# Patient Record
Sex: FEMALE | Race: BLACK OR AFRICAN AMERICAN | NOT HISPANIC OR LATINO | Employment: PART TIME | URBAN - METROPOLITAN AREA
[De-identification: names, ages, dates, MRNs, and addresses within clinical notes are randomized per-mention and may not be internally consistent; named-entity substitution may affect disease eponyms.]

---

## 2018-11-27 ENCOUNTER — TRANSCRIBE ORDERS (OUTPATIENT)
Dept: PHYSICAL THERAPY | Facility: HOSPITAL | Age: 53
End: 2018-11-27

## 2018-11-27 DIAGNOSIS — Z86.73 PERSONAL HISTORY OF TRANSIENT ISCHEMIC ATTACK (TIA), AND CEREBRAL INFARCTION WITHOUT RESIDUAL DEFICITS: Primary | ICD-10-CM

## 2018-11-30 ENCOUNTER — HOSPITAL ENCOUNTER (OUTPATIENT)
Dept: SPEECH THERAPY | Facility: HOSPITAL | Age: 53
Setting detail: THERAPIES SERIES
Discharge: HOME | End: 2018-11-30

## 2018-11-30 DIAGNOSIS — Z86.73 PERSONAL HISTORY OF TRANSIENT CEREBRAL ISCHEMIA: Primary | ICD-10-CM

## 2018-11-30 PROCEDURE — 92523 SPEECH SOUND LANG COMPREHEN: CPT | Performed by: SPEECH-LANGUAGE PATHOLOGIST

## 2018-12-02 ENCOUNTER — HOSPITAL ENCOUNTER (INPATIENT)
Facility: HOSPITAL | Age: 53
LOS: 1 days | Discharge: HOME | End: 2018-12-04
Attending: EMERGENCY MEDICINE | Admitting: INTERNAL MEDICINE

## 2018-12-02 ENCOUNTER — APPOINTMENT (OUTPATIENT)
Dept: GENERAL RADIOLOGY | Facility: HOSPITAL | Age: 53
End: 2018-12-02

## 2018-12-02 ENCOUNTER — APPOINTMENT (OUTPATIENT)
Dept: MRI IMAGING | Facility: HOSPITAL | Age: 53
End: 2018-12-02

## 2018-12-02 DIAGNOSIS — I63.9 ACUTE ISCHEMIC STROKE (HCC): ICD-10-CM

## 2018-12-02 DIAGNOSIS — E87.6 HYPOKALEMIA: ICD-10-CM

## 2018-12-02 DIAGNOSIS — Z78.9 IMPAIRED MOBILITY AND ADLS: ICD-10-CM

## 2018-12-02 DIAGNOSIS — Z74.09 IMPAIRED MOBILITY AND ADLS: ICD-10-CM

## 2018-12-02 DIAGNOSIS — Z86.73 HISTORY OF STROKE IN PRIOR 3 MONTHS: Primary | ICD-10-CM

## 2018-12-02 DIAGNOSIS — Z74.09 IMPAIRED FUNCTIONAL MOBILITY, BALANCE, GAIT, AND ENDURANCE: ICD-10-CM

## 2018-12-02 LAB
ALBUMIN SERPL-MCNC: 4.39 G/DL (ref 3.2–4.8)
ALBUMIN/GLOB SERPL: 1.3 G/DL (ref 1.5–2.5)
ALP SERPL-CCNC: 114 U/L (ref 25–100)
ALT SERPL W P-5'-P-CCNC: 19 U/L (ref 7–40)
ANION GAP SERPL CALCULATED.3IONS-SCNC: 1 MMOL/L (ref 3–11)
AST SERPL-CCNC: 16 U/L (ref 0–33)
BASOPHILS # BLD AUTO: 0.02 10*3/MM3 (ref 0–0.2)
BASOPHILS NFR BLD AUTO: 0.2 % (ref 0–1)
BILIRUB SERPL-MCNC: 0.4 MG/DL (ref 0.3–1.2)
BUN BLD-MCNC: 18 MG/DL (ref 9–23)
BUN/CREAT SERPL: 20 (ref 7–25)
CALCIUM SPEC-SCNC: 9.3 MG/DL (ref 8.7–10.4)
CHLORIDE SERPL-SCNC: 105 MMOL/L (ref 99–109)
CO2 SERPL-SCNC: 34 MMOL/L (ref 20–31)
CREAT BLD-MCNC: 0.9 MG/DL (ref 0.6–1.3)
DEPRECATED RDW RBC AUTO: 42.4 FL (ref 37–54)
EOSINOPHIL # BLD AUTO: 0.09 10*3/MM3 (ref 0–0.3)
EOSINOPHIL NFR BLD AUTO: 1 % (ref 0–3)
ERYTHROCYTE [DISTWIDTH] IN BLOOD BY AUTOMATED COUNT: 13.5 % (ref 11.3–14.5)
GFR SERPL CREATININE-BSD FRML MDRD: 80 ML/MIN/1.73
GLOBULIN UR ELPH-MCNC: 3.3 GM/DL
GLUCOSE BLD-MCNC: 108 MG/DL (ref 70–100)
HCT VFR BLD AUTO: 37.5 % (ref 34.5–44)
HGB BLD-MCNC: 12.9 G/DL (ref 11.5–15.5)
IMM GRANULOCYTES # BLD: 0.02 10*3/MM3 (ref 0–0.03)
IMM GRANULOCYTES NFR BLD: 0.2 % (ref 0–0.6)
INR PPP: 1.04 (ref 0.91–1.09)
LYMPHOCYTES # BLD AUTO: 2.48 10*3/MM3 (ref 0.6–4.8)
LYMPHOCYTES NFR BLD AUTO: 27.6 % (ref 24–44)
MCH RBC QN AUTO: 29.7 PG (ref 27–31)
MCHC RBC AUTO-ENTMCNC: 34.4 G/DL (ref 32–36)
MCV RBC AUTO: 86.4 FL (ref 80–99)
MONOCYTES # BLD AUTO: 0.85 10*3/MM3 (ref 0–1)
MONOCYTES NFR BLD AUTO: 9.5 % (ref 0–12)
NEUTROPHILS # BLD AUTO: 5.54 10*3/MM3 (ref 1.5–8.3)
NEUTROPHILS NFR BLD AUTO: 61.7 % (ref 41–71)
PLATELET # BLD AUTO: 313 10*3/MM3 (ref 150–450)
PMV BLD AUTO: 10.3 FL (ref 6–12)
POTASSIUM BLD-SCNC: 3.2 MMOL/L (ref 3.5–5.5)
PROT SERPL-MCNC: 7.7 G/DL (ref 5.7–8.2)
PROTHROMBIN TIME: 10.9 SECONDS (ref 9.6–11.5)
RBC # BLD AUTO: 4.34 10*6/MM3 (ref 3.89–5.14)
SODIUM BLD-SCNC: 140 MMOL/L (ref 132–146)
WBC NRBC COR # BLD: 8.98 10*3/MM3 (ref 3.5–10.8)

## 2018-12-02 PROCEDURE — 85025 COMPLETE CBC W/AUTO DIFF WBC: CPT | Performed by: EMERGENCY MEDICINE

## 2018-12-02 PROCEDURE — 71045 X-RAY EXAM CHEST 1 VIEW: CPT

## 2018-12-02 PROCEDURE — 80053 COMPREHEN METABOLIC PANEL: CPT | Performed by: EMERGENCY MEDICINE

## 2018-12-02 PROCEDURE — 85610 PROTHROMBIN TIME: CPT | Performed by: EMERGENCY MEDICINE

## 2018-12-02 PROCEDURE — 81001 URINALYSIS AUTO W/SCOPE: CPT | Performed by: EMERGENCY MEDICINE

## 2018-12-02 PROCEDURE — 70551 MRI BRAIN STEM W/O DYE: CPT

## 2018-12-02 PROCEDURE — 99285 EMERGENCY DEPT VISIT HI MDM: CPT

## 2018-12-02 RX ORDER — SODIUM CHLORIDE 0.9 % (FLUSH) 0.9 %
10 SYRINGE (ML) INJECTION AS NEEDED
Status: DISCONTINUED | OUTPATIENT
Start: 2018-12-02 | End: 2018-12-04 | Stop reason: HOSPADM

## 2018-12-02 RX ORDER — ASPIRIN 325 MG
325 TABLET ORAL ONCE
Status: COMPLETED | OUTPATIENT
Start: 2018-12-03 | End: 2018-12-03

## 2018-12-02 RX ORDER — POTASSIUM CHLORIDE 750 MG/1
20 CAPSULE, EXTENDED RELEASE ORAL ONCE
Status: COMPLETED | OUTPATIENT
Start: 2018-12-02 | End: 2018-12-02

## 2018-12-02 RX ADMIN — POTASSIUM CHLORIDE 20 MEQ: 750 CAPSULE, EXTENDED RELEASE ORAL at 23:41

## 2018-12-03 ENCOUNTER — APPOINTMENT (OUTPATIENT)
Dept: CARDIOLOGY | Facility: HOSPITAL | Age: 53
End: 2018-12-03

## 2018-12-03 PROBLEM — I10 ESSENTIAL HYPERTENSION: Status: ACTIVE | Noted: 2018-12-03

## 2018-12-03 PROBLEM — E11.9 TYPE 2 DIABETES MELLITUS (HCC): Status: ACTIVE | Noted: 2018-12-03

## 2018-12-03 PROBLEM — M06.9 RHEUMATOID ARTHRITIS (HCC): Status: ACTIVE | Noted: 2018-12-03

## 2018-12-03 PROBLEM — I63.9 CVA (CEREBRAL VASCULAR ACCIDENT) (HCC): Status: ACTIVE | Noted: 2018-12-03

## 2018-12-03 PROBLEM — E78.5 HYPERLIPEMIA: Status: ACTIVE | Noted: 2018-12-03

## 2018-12-03 LAB
ALBUMIN SERPL-MCNC: 4 G/DL (ref 3.2–4.8)
ALBUMIN/GLOB SERPL: 1.3 G/DL (ref 1.5–2.5)
ALP SERPL-CCNC: 103 U/L (ref 25–100)
ALT SERPL W P-5'-P-CCNC: 18 U/L (ref 7–40)
ANION GAP SERPL CALCULATED.3IONS-SCNC: 7 MMOL/L (ref 3–11)
ARTICHOKE IGE QN: 50 MG/DL (ref 0–130)
AST SERPL-CCNC: 17 U/L (ref 0–33)
BACTERIA UR QL AUTO: ABNORMAL /HPF
BH CV ECHO MEAS - AO MAX PG (FULL): 2.2 MMHG
BH CV ECHO MEAS - AO MAX PG: 7.4 MMHG
BH CV ECHO MEAS - AO MEAN PG (FULL): 0.84 MMHG
BH CV ECHO MEAS - AO MEAN PG: 3.5 MMHG
BH CV ECHO MEAS - AO ROOT AREA: 7 CM^2
BH CV ECHO MEAS - AO ROOT DIAM: 3 CM
BH CV ECHO MEAS - AO V2 MAX: 135.7 CM/SEC
BH CV ECHO MEAS - AO V2 MEAN: 85.4 CM/SEC
BH CV ECHO MEAS - AO V2 VTI: 34.3 CM
BH CV ECHO MEAS - ASC AORTA: 3 CM
BH CV ECHO MEAS - AVA(I,A): 2.6 CM^2
BH CV ECHO MEAS - AVA(I,D): 2.6 CM^2
BH CV ECHO MEAS - AVA(V,A): 2.7 CM^2
BH CV ECHO MEAS - AVA(V,D): 2.7 CM^2
BH CV ECHO MEAS - EDV(CUBED): 91.2 ML
BH CV ECHO MEAS - EDV(TEICH): 92.5 ML
BH CV ECHO MEAS - EF(CUBED): 83.2 %
BH CV ECHO MEAS - EF(TEICH): 76.2 %
BH CV ECHO MEAS - ESV(CUBED): 15.4 ML
BH CV ECHO MEAS - ESV(TEICH): 22 ML
BH CV ECHO MEAS - FS: 44.8 %
BH CV ECHO MEAS - IVS/LVPW: 1.1
BH CV ECHO MEAS - IVSD: 1.1 CM
BH CV ECHO MEAS - LA DIMENSION: 3.7 CM
BH CV ECHO MEAS - LA/AO: 1.3
BH CV ECHO MEAS - LAD MAJOR: 5.2 CM
BH CV ECHO MEAS - LAT PEAK E' VEL: 8.1 CM/SEC
BH CV ECHO MEAS - LATERAL E/E' RATIO: 10.2
BH CV ECHO MEAS - LV MASS(C)D: 167 GRAMS
BH CV ECHO MEAS - LV MAX PG: 5.2 MMHG
BH CV ECHO MEAS - LV MEAN PG: 2.6 MMHG
BH CV ECHO MEAS - LV V1 MAX: 113.5 CM/SEC
BH CV ECHO MEAS - LV V1 MEAN: 73.9 CM/SEC
BH CV ECHO MEAS - LV V1 VTI: 27.6 CM
BH CV ECHO MEAS - LVIDD: 4.5 CM
BH CV ECHO MEAS - LVIDS: 2.5 CM
BH CV ECHO MEAS - LVOT AREA (M): 3.1 CM^2
BH CV ECHO MEAS - LVOT AREA: 3.2 CM^2
BH CV ECHO MEAS - LVOT DIAM: 2 CM
BH CV ECHO MEAS - LVPWD: 1 CM
BH CV ECHO MEAS - MED PEAK E' VEL: 6 CM/SEC
BH CV ECHO MEAS - MEDIAL E/E' RATIO: 13.7
BH CV ECHO MEAS - MV A MAX VEL: 82.4 CM/SEC
BH CV ECHO MEAS - MV DEC TIME: 0.17 SEC
BH CV ECHO MEAS - MV E MAX VEL: 84.4 CM/SEC
BH CV ECHO MEAS - MV E/A: 1
BH CV ECHO MEAS - PA ACC SLOPE: 611.7 CM/SEC^2
BH CV ECHO MEAS - PA ACC TIME: 0.12 SEC
BH CV ECHO MEAS - PA MAX PG: 3.1 MMHG
BH CV ECHO MEAS - PA PR(ACCEL): 22.8 MMHG
BH CV ECHO MEAS - PA V2 MAX: 88.6 CM/SEC
BH CV ECHO MEAS - RAP SYSTOLE: 3 MMHG
BH CV ECHO MEAS - RVDD: 1.8 CM
BH CV ECHO MEAS - RVSP: 30 MMHG
BH CV ECHO MEAS - SV(AO): 239.9 ML
BH CV ECHO MEAS - SV(CUBED): 75.8 ML
BH CV ECHO MEAS - SV(LVOT): 88.2 ML
BH CV ECHO MEAS - SV(TEICH): 70.5 ML
BH CV ECHO MEAS - TAPSE (>1.6): 3 CM2
BH CV ECHO MEAS - TR MAX PG: 27 MMHG
BH CV ECHO MEAS - TR MAX VEL: 259.1 CM/SEC
BH CV ECHO MEASUREMENTS AVERAGE E/E' RATIO: 11.97
BH CV XLRA - RV BASE: 3.3 CM
BH CV XLRA - RV LENGTH: 7 CM
BH CV XLRA - RV MID: 2.6 CM
BH CV XLRA - TDI S': 14.6 CM/SEC
BH CV XLRA MEAS LEFT DIST CCA EDV: 22 CM/SEC
BH CV XLRA MEAS LEFT DIST CCA PSV: 85 CM/SEC
BH CV XLRA MEAS LEFT DIST ICA EDV: 24 CM/SEC
BH CV XLRA MEAS LEFT DIST ICA PSV: 79 CM/SEC
BH CV XLRA MEAS LEFT ICA/CCA RATIO: 0.64
BH CV XLRA MEAS LEFT MID CCA EDV: 19 CM/SEC
BH CV XLRA MEAS LEFT MID CCA PSV: 109 CM/SEC
BH CV XLRA MEAS LEFT MID ICA EDV: 16 CM/SEC
BH CV XLRA MEAS LEFT MID ICA PSV: 49 CM/SEC
BH CV XLRA MEAS LEFT PROX CCA EDV: 19 CM/SEC
BH CV XLRA MEAS LEFT PROX CCA PSV: 137 CM/SEC
BH CV XLRA MEAS LEFT PROX ECA EDV: 16 CM/SEC
BH CV XLRA MEAS LEFT PROX ECA PSV: 72 CM/SEC
BH CV XLRA MEAS LEFT PROX ICA EDV: 13 CM/SEC
BH CV XLRA MEAS LEFT PROX ICA PSV: 55 CM/SEC
BH CV XLRA MEAS LEFT PROX SCLA PSV: 132 CM/SEC
BH CV XLRA MEAS LEFT VERTEBRAL A EDV: 17 CM/SEC
BH CV XLRA MEAS LEFT VERTEBRAL A PSV: 59 CM/SEC
BH CV XLRA MEAS RIGHT CCA RATIO VEL: 82.5 CM/SEC
BH CV XLRA MEAS RIGHT DIST CCA EDV: 21.2 CM/SEC
BH CV XLRA MEAS RIGHT DIST CCA PSV: 83.3 CM/SEC
BH CV XLRA MEAS RIGHT DIST ICA EDV: 23.6 CM/SEC
BH CV XLRA MEAS RIGHT DIST ICA PSV: 54.2 CM/SEC
BH CV XLRA MEAS RIGHT ICA RATIO VEL: 91.1 CM/SEC
BH CV XLRA MEAS RIGHT ICA/CCA RATIO: 1.1
BH CV XLRA MEAS RIGHT MID CCA EDV: 22.8 CM/SEC
BH CV XLRA MEAS RIGHT MID CCA PSV: 107.6 CM/SEC
BH CV XLRA MEAS RIGHT MID ICA EDV: 18.9 CM/SEC
BH CV XLRA MEAS RIGHT MID ICA PSV: 91.9 CM/SEC
BH CV XLRA MEAS RIGHT PROX CCA EDV: 19.6 CM/SEC
BH CV XLRA MEAS RIGHT PROX CCA PSV: 114.7 CM/SEC
BH CV XLRA MEAS RIGHT PROX ECA EDV: 11.8 CM/SEC
BH CV XLRA MEAS RIGHT PROX ECA PSV: 77.8 CM/SEC
BH CV XLRA MEAS RIGHT PROX ICA EDV: 22 CM/SEC
BH CV XLRA MEAS RIGHT PROX ICA PSV: 84.1 CM/SEC
BH CV XLRA MEAS RIGHT PROX SCLA PSV: 89.6 CM/SEC
BH CV XLRA MEAS RIGHT VERTEBRAL A EDV: 21.2 CM/SEC
BH CV XLRA MEAS RIGHT VERTEBRAL A PSV: 55.8 CM/SEC
BILIRUB SERPL-MCNC: 0.5 MG/DL (ref 0.3–1.2)
BILIRUB UR QL STRIP: NEGATIVE
BUN BLD-MCNC: 14 MG/DL (ref 9–23)
BUN/CREAT SERPL: 16.9 (ref 7–25)
CALCIUM SPEC-SCNC: 9.1 MG/DL (ref 8.7–10.4)
CHLORIDE SERPL-SCNC: 102 MMOL/L (ref 99–109)
CHOLEST SERPL-MCNC: 93 MG/DL (ref 0–200)
CLARITY UR: CLEAR
CO2 SERPL-SCNC: 30 MMOL/L (ref 20–31)
COLOR UR: YELLOW
CREAT BLD-MCNC: 0.83 MG/DL (ref 0.6–1.3)
GFR SERPL CREATININE-BSD FRML MDRD: 87 ML/MIN/1.73
GLOBULIN UR ELPH-MCNC: 3 GM/DL
GLUCOSE BLD-MCNC: 94 MG/DL (ref 70–100)
GLUCOSE BLDC GLUCOMTR-MCNC: 100 MG/DL (ref 70–130)
GLUCOSE BLDC GLUCOMTR-MCNC: 102 MG/DL (ref 70–130)
GLUCOSE BLDC GLUCOMTR-MCNC: 119 MG/DL (ref 70–130)
GLUCOSE BLDC GLUCOMTR-MCNC: 132 MG/DL (ref 70–130)
GLUCOSE BLDC GLUCOMTR-MCNC: 145 MG/DL (ref 70–130)
GLUCOSE BLDC GLUCOMTR-MCNC: 97 MG/DL (ref 70–130)
GLUCOSE UR STRIP-MCNC: NEGATIVE MG/DL
HBA1C MFR BLD: 6.6 % (ref 4.8–5.6)
HDLC SERPL-MCNC: 30 MG/DL (ref 40–60)
HGB UR QL STRIP.AUTO: NEGATIVE
HYALINE CASTS UR QL AUTO: ABNORMAL /LPF
KETONES UR QL STRIP: NEGATIVE
LEUKOCYTE ESTERASE UR QL STRIP.AUTO: ABNORMAL
NITRITE UR QL STRIP: NEGATIVE
PH UR STRIP.AUTO: 6.5 [PH] (ref 5–8)
POTASSIUM BLD-SCNC: 3.3 MMOL/L (ref 3.5–5.5)
PROT SERPL-MCNC: 7 G/DL (ref 5.7–8.2)
PROT UR QL STRIP: NEGATIVE
RBC # UR: ABNORMAL /HPF
REF LAB TEST METHOD: ABNORMAL
SODIUM BLD-SCNC: 139 MMOL/L (ref 132–146)
SP GR UR STRIP: 1.02 (ref 1–1.03)
SQUAMOUS #/AREA URNS HPF: ABNORMAL /HPF
TRIGL SERPL-MCNC: 53 MG/DL (ref 0–150)
UROBILINOGEN UR QL STRIP: ABNORMAL
WBC UR QL AUTO: ABNORMAL /HPF

## 2018-12-03 PROCEDURE — 82962 GLUCOSE BLOOD TEST: CPT

## 2018-12-03 PROCEDURE — 93880 EXTRACRANIAL BILAT STUDY: CPT

## 2018-12-03 PROCEDURE — 93306 TTE W/DOPPLER COMPLETE: CPT

## 2018-12-03 PROCEDURE — 63710000001 INSULIN LISPRO (HUMAN) PER 5 UNITS: Performed by: PHYSICIAN ASSISTANT

## 2018-12-03 PROCEDURE — G8978 MOBILITY CURRENT STATUS: HCPCS

## 2018-12-03 PROCEDURE — 97116 GAIT TRAINING THERAPY: CPT

## 2018-12-03 PROCEDURE — 80061 LIPID PANEL: CPT | Performed by: PHYSICIAN ASSISTANT

## 2018-12-03 PROCEDURE — 92523 SPEECH SOUND LANG COMPREHEN: CPT

## 2018-12-03 PROCEDURE — 97162 PT EVAL MOD COMPLEX 30 MIN: CPT

## 2018-12-03 PROCEDURE — 97165 OT EVAL LOW COMPLEX 30 MIN: CPT

## 2018-12-03 PROCEDURE — 83036 HEMOGLOBIN GLYCOSYLATED A1C: CPT | Performed by: PHYSICIAN ASSISTANT

## 2018-12-03 PROCEDURE — 93880 EXTRACRANIAL BILAT STUDY: CPT | Performed by: INTERNAL MEDICINE

## 2018-12-03 PROCEDURE — 99255 IP/OBS CONSLTJ NEW/EST HI 80: CPT | Performed by: PSYCHIATRY & NEUROLOGY

## 2018-12-03 PROCEDURE — G8979 MOBILITY GOAL STATUS: HCPCS

## 2018-12-03 PROCEDURE — 80053 COMPREHEN METABOLIC PANEL: CPT | Performed by: PHYSICIAN ASSISTANT

## 2018-12-03 PROCEDURE — 93306 TTE W/DOPPLER COMPLETE: CPT | Performed by: INTERNAL MEDICINE

## 2018-12-03 PROCEDURE — 97530 THERAPEUTIC ACTIVITIES: CPT

## 2018-12-03 PROCEDURE — 99223 1ST HOSP IP/OBS HIGH 75: CPT | Performed by: INTERNAL MEDICINE

## 2018-12-03 RX ORDER — DEXTROSE MONOHYDRATE 25 G/50ML
25 INJECTION, SOLUTION INTRAVENOUS
Status: DISCONTINUED | OUTPATIENT
Start: 2018-12-03 | End: 2018-12-04 | Stop reason: HOSPADM

## 2018-12-03 RX ORDER — HYDROCHLOROTHIAZIDE 25 MG/1
25 TABLET ORAL DAILY
COMMUNITY

## 2018-12-03 RX ORDER — ATORVASTATIN CALCIUM 40 MG/1
40 TABLET, FILM COATED ORAL NIGHTLY
Refills: 0 | COMMUNITY
Start: 2018-11-24 | End: 2018-12-04 | Stop reason: HOSPADM

## 2018-12-03 RX ORDER — HYDROCHLOROTHIAZIDE 25 MG/1
25 TABLET ORAL DAILY
Status: DISCONTINUED | OUTPATIENT
Start: 2018-12-03 | End: 2018-12-04 | Stop reason: HOSPADM

## 2018-12-03 RX ORDER — POTASSIUM CHLORIDE 750 MG/1
40 CAPSULE, EXTENDED RELEASE ORAL AS NEEDED
Status: DISCONTINUED | OUTPATIENT
Start: 2018-12-03 | End: 2018-12-04 | Stop reason: HOSPADM

## 2018-12-03 RX ORDER — ASPIRIN 300 MG/1
300 SUPPOSITORY RECTAL DAILY
Status: DISCONTINUED | OUTPATIENT
Start: 2018-12-03 | End: 2018-12-04 | Stop reason: HOSPADM

## 2018-12-03 RX ORDER — SODIUM CHLORIDE 0.9 % (FLUSH) 0.9 %
3 SYRINGE (ML) INJECTION EVERY 12 HOURS SCHEDULED
Status: DISCONTINUED | OUTPATIENT
Start: 2018-12-03 | End: 2018-12-04 | Stop reason: HOSPADM

## 2018-12-03 RX ORDER — LOSARTAN POTASSIUM 50 MG/1
100 TABLET ORAL DAILY
COMMUNITY

## 2018-12-03 RX ORDER — GLIPIZIDE 5 MG/1
5 TABLET ORAL DAILY
COMMUNITY

## 2018-12-03 RX ORDER — NICOTINE POLACRILEX 4 MG
15 LOZENGE BUCCAL
Status: DISCONTINUED | OUTPATIENT
Start: 2018-12-03 | End: 2018-12-04 | Stop reason: HOSPADM

## 2018-12-03 RX ORDER — POTASSIUM CHLORIDE 1.5 G/1.77G
40 POWDER, FOR SOLUTION ORAL AS NEEDED
Status: DISCONTINUED | OUTPATIENT
Start: 2018-12-03 | End: 2018-12-04 | Stop reason: HOSPADM

## 2018-12-03 RX ORDER — ASPIRIN 325 MG
325 TABLET ORAL DAILY
Status: DISCONTINUED | OUTPATIENT
Start: 2018-12-03 | End: 2018-12-04 | Stop reason: HOSPADM

## 2018-12-03 RX ORDER — AMLODIPINE BESYLATE 5 MG/1
5 TABLET ORAL
Status: DISCONTINUED | OUTPATIENT
Start: 2018-12-03 | End: 2018-12-04 | Stop reason: HOSPADM

## 2018-12-03 RX ORDER — ACETAMINOPHEN 325 MG/1
650 TABLET ORAL EVERY 6 HOURS PRN
Status: DISCONTINUED | OUTPATIENT
Start: 2018-12-03 | End: 2018-12-04 | Stop reason: HOSPADM

## 2018-12-03 RX ORDER — ATORVASTATIN CALCIUM 40 MG/1
80 TABLET, FILM COATED ORAL NIGHTLY
Status: DISCONTINUED | OUTPATIENT
Start: 2018-12-03 | End: 2018-12-04 | Stop reason: HOSPADM

## 2018-12-03 RX ORDER — AMLODIPINE BESYLATE 5 MG/1
5 TABLET ORAL DAILY
COMMUNITY

## 2018-12-03 RX ORDER — POTASSIUM CHLORIDE 750 MG/1
10 CAPSULE, EXTENDED RELEASE ORAL DAILY
Status: DISCONTINUED | OUTPATIENT
Start: 2018-12-03 | End: 2018-12-04 | Stop reason: HOSPADM

## 2018-12-03 RX ORDER — SODIUM CHLORIDE 0.9 % (FLUSH) 0.9 %
3-10 SYRINGE (ML) INJECTION AS NEEDED
Status: DISCONTINUED | OUTPATIENT
Start: 2018-12-03 | End: 2018-12-04 | Stop reason: HOSPADM

## 2018-12-03 RX ADMIN — SODIUM CHLORIDE, PRESERVATIVE FREE 3 ML: 5 INJECTION INTRAVENOUS at 11:05

## 2018-12-03 RX ADMIN — ASPIRIN 325 MG ORAL TABLET 325 MG: 325 PILL ORAL at 11:05

## 2018-12-03 RX ADMIN — SODIUM CHLORIDE, PRESERVATIVE FREE 3 ML: 5 INJECTION INTRAVENOUS at 20:59

## 2018-12-03 RX ADMIN — HYDROCHLOROTHIAZIDE 25 MG: 25 TABLET ORAL at 17:45

## 2018-12-03 RX ADMIN — ACETAMINOPHEN 650 MG: 325 TABLET ORAL at 02:23

## 2018-12-03 RX ADMIN — POTASSIUM CHLORIDE 10 MEQ: 750 CAPSULE, EXTENDED RELEASE ORAL at 17:45

## 2018-12-03 RX ADMIN — ASPIRIN 325 MG ORAL TABLET 325 MG: 325 PILL ORAL at 00:45

## 2018-12-03 RX ADMIN — AMLODIPINE BESYLATE 5 MG: 5 TABLET ORAL at 17:44

## 2018-12-03 RX ADMIN — POTASSIUM CHLORIDE 40 MEQ: 750 CAPSULE, EXTENDED RELEASE ORAL at 20:58

## 2018-12-03 RX ADMIN — ATORVASTATIN CALCIUM 80 MG: 40 TABLET, FILM COATED ORAL at 20:58

## 2018-12-03 NOTE — ED PROVIDER NOTES
"Subjective   54 yo F presents complaining of right-sided heaviness.  The patient states that she suffered a stroke on October 5 of this year.  As a result, she states that she has mild right-sided residual deficits that she describes as a \"heaviness\" in both her right upper and lower extremities.  She states that the heaviness is worse in her right lower extremity than the upper extremity.  She is ambulatory without difficulty.  She states that over the past 2 days, she has been experiencing increased \"heaviness\" on her right side, especially in her right lower extremity compared to baseline.  She endorses recent URI-like symptoms including cough, congestion, and myalgias.  No fevers.  No injury or trauma.  No falls.  She is unsure as to what may have triggered her exacerbation in her \"heaviness.\"  She endorses compliance with all of her medications.        History provided by:  Patient  Illness   Location:  R leg, R arm  Quality:  Heaviness  Severity:  Moderate  Duration:  2 months  Timing:  Constant  Progression:  Worsening  Associated symptoms: no congestion, no cough and no fever    Risk factors:  Hx stroke 2 months ago      Review of Systems   Constitutional: Negative for chills and fever.   HENT: Negative for congestion.    Respiratory: Negative for cough.    Neurological: Positive for weakness.   All other systems reviewed and are negative.      Past Medical History:   Diagnosis Date   • Diabetes mellitus (CMS/HCC)    • Hyperlipidemia    • Hypertension    • RA (rheumatoid arthritis) (CMS/HCC)    • Stroke (CMS/HCC)        No Known Allergies    Past Surgical History:   Procedure Laterality Date   • CHOLECYSTECTOMY     • FRACTURE SURGERY     • HYSTERECTOMY         History reviewed. No pertinent family history.    Social History     Socioeconomic History   • Marital status:      Spouse name: Not on file   • Number of children: Not on file   • Years of education: Not on file   • Highest education level: Not " on file   Tobacco Use   • Smoking status: Never Smoker   Substance and Sexual Activity   • Alcohol use: No     Frequency: Never   • Drug use: No         Objective   Physical Exam   Constitutional: She is oriented to person, place, and time. She appears well-developed and well-nourished. No distress.   Well-appearing female in no acute distress   HENT:   Head: Normocephalic and atraumatic.   Mouth/Throat: Oropharynx is clear and moist.   Eyes: EOM are normal. Pupils are equal, round, and reactive to light.   Neck:   No JVD, no carotid bruits   Cardiovascular: Normal rate, regular rhythm and normal heart sounds. Exam reveals no gallop and no friction rub.   No murmur heard.  Pulmonary/Chest: Effort normal and breath sounds normal. No respiratory distress. She has no wheezes. She has no rales.   Abdominal: Soft. Bowel sounds are normal. She exhibits no distension and no mass. There is no tenderness. There is no rebound and no guarding.   Musculoskeletal: Normal range of motion.   Neurological: She is alert and oriented to person, place, and time. No cranial nerve deficit or sensory deficit. Coordination normal.   5 out of 5 strength in all fours, neurovascularly intact distally in all fours with bounding distal pulses and normal sensation, normal gait, no ataxia, no dysmetria, clear and fluent speech, awake, alert, and oriented ×3, no droop, no drift   Skin: Skin is warm and dry. No rash noted. She is not diaphoretic. No erythema.   Psychiatric: She has a normal mood and affect. Judgment and thought content normal.   Nursing note and vitals reviewed.      Critical Care  Performed by: Will Beard MD  Authorized by: Will eBard MD     Critical care provider statement:     Critical care time (minutes):  35    Critical care was necessary to treat or prevent imminent or life-threatening deterioration of the following conditions:  CNS failure or compromise    Critical care was time spent personally by me on  "the following activities:  Development of treatment plan with patient or surrogate, evaluation of patient's response to treatment, examination of patient, obtaining history from patient or surrogate, ordering and performing treatments and interventions, ordering and review of laboratory studies, ordering and review of radiographic studies, pulse oximetry and re-evaluation of patient's condition             ED Course  ED Course as of Dec 03 0439   Sun Dec 02, 2018   2119 53-year-old female presents for evaluation of \"right-sided heaviness.\"  Of note, the patient had a stroke on October 5 and has had residual right-sided \"heaviness\" since that time.  However, over the past 2 days she has had increased \"heaviness\" compared to baseline, prompting her visit to the ED today.  On arrival, patient very well-appearing.  Benign exam.  No focal neurological deficits noted.  NIH stroke scale of 0.  We will obtain labs and imaging and we will reassess following initial interventions.  Of note, the patient also states that she has been experiencing URI symptoms over the past week.  [DD]   2209 Labs remarkable for mild hypokalemia.  Potassium replaced orally in the ED.  [DD]   2322 CXR negative.  [DD]   Mon Dec 03, 2018   0008 MRI revealed a new acute ischemic stroke in the left parietal periventricular matter.  Aspirin given.  Blood pressure adequately controlled.  I discussed the patient's case with Dr. Xiong, and we will admit for further evaluation and treatment as well as a neurology consult.  The patient is hemodynamically stable and aware/agreeable with the plan.  [DD]      ED Course User Index  [DD] Will Beard MD       Recent Results (from the past 24 hour(s))   Comprehensive Metabolic Panel    Collection Time: 12/02/18  9:32 PM   Result Value Ref Range    Glucose 108 (H) 70 - 100 mg/dL    BUN 18 9 - 23 mg/dL    Creatinine 0.90 0.60 - 1.30 mg/dL    Sodium 140 132 - 146 mmol/L    Potassium 3.2 (L) 3.5 - 5.5 mmol/L "    Chloride 105 99 - 109 mmol/L    CO2 34.0 (H) 20.0 - 31.0 mmol/L    Calcium 9.3 8.7 - 10.4 mg/dL    Total Protein 7.7 5.7 - 8.2 g/dL    Albumin 4.39 3.20 - 4.80 g/dL    ALT (SGPT) 19 7 - 40 U/L    AST (SGOT) 16 0 - 33 U/L    Alkaline Phosphatase 114 (H) 25 - 100 U/L    Total Bilirubin 0.4 0.3 - 1.2 mg/dL    eGFR  African Amer 80 >60 mL/min/1.73    Globulin 3.3 gm/dL    A/G Ratio 1.3 (L) 1.5 - 2.5 g/dL    BUN/Creatinine Ratio 20.0 7.0 - 25.0    Anion Gap 1.0 (L) 3.0 - 11.0 mmol/L   Protime-INR    Collection Time: 12/02/18  9:32 PM   Result Value Ref Range    Protime 10.9 9.6 - 11.5 Seconds    INR 1.04 0.91 - 1.09   CBC Auto Differential    Collection Time: 12/02/18  9:32 PM   Result Value Ref Range    WBC 8.98 3.50 - 10.80 10*3/mm3    RBC 4.34 3.89 - 5.14 10*6/mm3    Hemoglobin 12.9 11.5 - 15.5 g/dL    Hematocrit 37.5 34.5 - 44.0 %    MCV 86.4 80.0 - 99.0 fL    MCH 29.7 27.0 - 31.0 pg    MCHC 34.4 32.0 - 36.0 g/dL    RDW 13.5 11.3 - 14.5 %    RDW-SD 42.4 37.0 - 54.0 fl    MPV 10.3 6.0 - 12.0 fL    Platelets 313 150 - 450 10*3/mm3    Neutrophil % 61.7 41.0 - 71.0 %    Lymphocyte % 27.6 24.0 - 44.0 %    Monocyte % 9.5 0.0 - 12.0 %    Eosinophil % 1.0 0.0 - 3.0 %    Basophil % 0.2 0.0 - 1.0 %    Immature Grans % 0.2 0.0 - 0.6 %    Neutrophils, Absolute 5.54 1.50 - 8.30 10*3/mm3    Lymphocytes, Absolute 2.48 0.60 - 4.80 10*3/mm3    Monocytes, Absolute 0.85 0.00 - 1.00 10*3/mm3    Eosinophils, Absolute 0.09 0.00 - 0.30 10*3/mm3    Basophils, Absolute 0.02 0.00 - 0.20 10*3/mm3    Immature Grans, Absolute 0.02 0.00 - 0.03 10*3/mm3   Urinalysis With Microscopic If Indicated (No Culture) - Urine, Clean Catch    Collection Time: 12/02/18 11:52 PM   Result Value Ref Range    Color, UA Yellow Yellow, Straw    Appearance, UA Clear Clear    pH, UA 6.5 5.0 - 8.0    Specific Gravity, UA 1.020 1.001 - 1.030    Glucose, UA Negative Negative    Ketones, UA Negative Negative    Bilirubin, UA Negative Negative    Blood, UA Negative  Negative    Protein, UA Negative Negative    Leuk Esterase, UA Trace (A) Negative    Nitrite, UA Negative Negative    Urobilinogen, UA 0.2 E.U./dL 0.2 - 1.0 E.U./dL   Urinalysis, Microscopic Only - Urine, Clean Catch    Collection Time: 12/02/18 11:52 PM   Result Value Ref Range    RBC, UA 0-2 None Seen, 0-2 /HPF    WBC, UA 3-5 (A) None Seen, 0-2 /HPF    Bacteria, UA None Seen None Seen, Trace /HPF    Squamous Epithelial Cells, UA 3-6 (A) None Seen, 0-2 /HPF    Hyaline Casts, UA 0-6 0 - 6 /LPF    Methodology Automated Microscopy      Note: In addition to lab results from this visit, the labs listed above may include labs taken at another facility or during a different encounter within the last 24 hours. Please correlate lab times with ED admission and discharge times for further clarification of the services performed during this visit.    MRI Brain Without Contrast   Final Result   Addendum 1 of 1   Note: Critical Findings were discussed with FELICITY Ly at 12/3/2018    11:58    AM EST. The report was understood and acknowledged by the healthcare    giver.      THIS DOCUMENT HAS BEEN ELECTRONICALLY SIGNED BY GAVIN MENSAH MD      Final   1. Small acute infarct in left parietal periventricular white matter.    2. Chronic mild microvascular ischemic disease of bilateral cerebral white    matter.    3. Suspect chronic left thalamic infarct.    4.  No acute intracranial hemorrhage visualized.   5.  Mild bilateral ethmoid and right maxillary sinusitis.      THIS DOCUMENT HAS BEEN ELECTRONICALLY SIGNED BY GAVIN MENSAH MD      XR Chest 1 View   Final Result   No acute cardiopulmonary disease demonstrated.       THIS DOCUMENT HAS BEEN ELECTRONICALLY SIGNED BY GAVIN MENSAH MD        Vitals:    12/02/18 1949 12/02/18 2200   BP: 156/75 137/80   BP Location: Left arm    Patient Position: Sitting    Pulse: 76 63   Resp: 18    Temp: 98.1 °F (36.7 °C)    TempSrc: Oral    SpO2: 98% 96%   Weight: 77.1 kg (170 lb)   "  Height: 157.5 cm (62\")      Medications   sodium chloride 0.9 % flush 10 mL (not administered)   aspirin tablet 325 mg (not administered)   potassium chloride (MICRO-K) CR capsule 20 mEq (20 mEq Oral Given 12/2/18 3861)     ECG/EMG Results (last 24 hours)     ** No results found for the last 24 hours. **                  Recent Results (from the past 24 hour(s))   Comprehensive Metabolic Panel    Collection Time: 12/02/18  9:32 PM   Result Value Ref Range    Glucose 108 (H) 70 - 100 mg/dL    BUN 18 9 - 23 mg/dL    Creatinine 0.90 0.60 - 1.30 mg/dL    Sodium 140 132 - 146 mmol/L    Potassium 3.2 (L) 3.5 - 5.5 mmol/L    Chloride 105 99 - 109 mmol/L    CO2 34.0 (H) 20.0 - 31.0 mmol/L    Calcium 9.3 8.7 - 10.4 mg/dL    Total Protein 7.7 5.7 - 8.2 g/dL    Albumin 4.39 3.20 - 4.80 g/dL    ALT (SGPT) 19 7 - 40 U/L    AST (SGOT) 16 0 - 33 U/L    Alkaline Phosphatase 114 (H) 25 - 100 U/L    Total Bilirubin 0.4 0.3 - 1.2 mg/dL    eGFR  African Amer 80 >60 mL/min/1.73    Globulin 3.3 gm/dL    A/G Ratio 1.3 (L) 1.5 - 2.5 g/dL    BUN/Creatinine Ratio 20.0 7.0 - 25.0    Anion Gap 1.0 (L) 3.0 - 11.0 mmol/L   Protime-INR    Collection Time: 12/02/18  9:32 PM   Result Value Ref Range    Protime 10.9 9.6 - 11.5 Seconds    INR 1.04 0.91 - 1.09   CBC Auto Differential    Collection Time: 12/02/18  9:32 PM   Result Value Ref Range    WBC 8.98 3.50 - 10.80 10*3/mm3    RBC 4.34 3.89 - 5.14 10*6/mm3    Hemoglobin 12.9 11.5 - 15.5 g/dL    Hematocrit 37.5 34.5 - 44.0 %    MCV 86.4 80.0 - 99.0 fL    MCH 29.7 27.0 - 31.0 pg    MCHC 34.4 32.0 - 36.0 g/dL    RDW 13.5 11.3 - 14.5 %    RDW-SD 42.4 37.0 - 54.0 fl    MPV 10.3 6.0 - 12.0 fL    Platelets 313 150 - 450 10*3/mm3    Neutrophil % 61.7 41.0 - 71.0 %    Lymphocyte % 27.6 24.0 - 44.0 %    Monocyte % 9.5 0.0 - 12.0 %    Eosinophil % 1.0 0.0 - 3.0 %    Basophil % 0.2 0.0 - 1.0 %    Immature Grans % 0.2 0.0 - 0.6 %    Neutrophils, Absolute 5.54 1.50 - 8.30 10*3/mm3    Lymphocytes, Absolute " 2.48 0.60 - 4.80 10*3/mm3    Monocytes, Absolute 0.85 0.00 - 1.00 10*3/mm3    Eosinophils, Absolute 0.09 0.00 - 0.30 10*3/mm3    Basophils, Absolute 0.02 0.00 - 0.20 10*3/mm3    Immature Grans, Absolute 0.02 0.00 - 0.03 10*3/mm3   Urinalysis With Microscopic If Indicated (No Culture) - Urine, Clean Catch    Collection Time: 12/02/18 11:52 PM   Result Value Ref Range    Color, UA Yellow Yellow, Straw    Appearance, UA Clear Clear    pH, UA 6.5 5.0 - 8.0    Specific Gravity, UA 1.020 1.001 - 1.030    Glucose, UA Negative Negative    Ketones, UA Negative Negative    Bilirubin, UA Negative Negative    Blood, UA Negative Negative    Protein, UA Negative Negative    Leuk Esterase, UA Trace (A) Negative    Nitrite, UA Negative Negative    Urobilinogen, UA 0.2 E.U./dL 0.2 - 1.0 E.U./dL   Urinalysis, Microscopic Only - Urine, Clean Catch    Collection Time: 12/02/18 11:52 PM   Result Value Ref Range    RBC, UA 0-2 None Seen, 0-2 /HPF    WBC, UA 3-5 (A) None Seen, 0-2 /HPF    Bacteria, UA None Seen None Seen, Trace /HPF    Squamous Epithelial Cells, UA 3-6 (A) None Seen, 0-2 /HPF    Hyaline Casts, UA 0-6 0 - 6 /LPF    Methodology Automated Microscopy    POC Glucose Once    Collection Time: 12/03/18  2:24 AM   Result Value Ref Range    Glucose 100 70 - 130 mg/dL     Note: In addition to lab results from this visit, the labs listed above may include labs taken at another facility or during a different encounter within the last 24 hours. Please correlate lab times with ED admission and discharge times for further clarification of the services performed during this visit.    MRI Brain Without Contrast   Final Result   Addendum 1 of 1   Note: Critical Findings were discussed with FELICITY Ly at 12/3/2018    11:58    AM EST. The report was understood and acknowledged by the healthcare    giver.      THIS DOCUMENT HAS BEEN ELECTRONICALLY SIGNED BY GAVIN MENSAH MD      Final   1. Small acute infarct in left parietal  periventricular white matter.    2. Chronic mild microvascular ischemic disease of bilateral cerebral white    matter.    3. Suspect chronic left thalamic infarct.    4.  No acute intracranial hemorrhage visualized.   5.  Mild bilateral ethmoid and right maxillary sinusitis.      THIS DOCUMENT HAS BEEN ELECTRONICALLY SIGNED BY GAVIN MENSAH MD      XR Chest 1 View   Final Result   No acute cardiopulmonary disease demonstrated.       THIS DOCUMENT HAS BEEN ELECTRONICALLY SIGNED BY GAVIN MENSAH MD        Vitals:    12/03/18 0000 12/03/18 0030 12/03/18 0100 12/03/18 0133   BP: 117/75 156/89 142/83 162/97   BP Location:    Left arm   Patient Position:    Sitting   Pulse:   72 74   Resp:   18 18   Temp:    97.9 °F (36.6 °C)   TempSrc:    Oral   SpO2: 96% 96% 98% 100%   Weight:    75.9 kg (167 lb 6.4 oz)   Height:         Medications   sodium chloride 0.9 % flush 10 mL (not administered)   sodium chloride 0.9 % flush 3 mL (not administered)   sodium chloride 0.9 % flush 3-10 mL (not administered)   atorvastatin (LIPITOR) tablet 80 mg (not administered)   aspirin tablet 325 mg (not administered)     Or   aspirin suppository 300 mg (not administered)   dextrose (GLUTOSE) oral gel 15 g (not administered)   dextrose (D50W) 25 g/ 50mL Intravenous Solution 25 g (not administered)   glucagon (human recombinant) (GLUCAGEN DIAGNOSTIC) injection 1 mg (not administered)   insulin lispro (humaLOG) injection 0-7 Units (not administered)   acetaminophen (TYLENOL) tablet 650 mg (650 mg Oral Given 12/3/18 6633)   potassium chloride (MICRO-K) CR capsule 20 mEq (20 mEq Oral Given 12/2/18 2341)   aspirin tablet 325 mg (325 mg Oral Given 12/3/18 0045)     ECG/EMG Results (last 24 hours)     ** No results found for the last 24 hours. **            [facility]    Final diagnoses:   History of stroke in prior 3 months   Hypokalemia   Acute ischemic stroke (CMS/HCC)       Documentation assistance provided by elizabeth Gale.  Information  recorded by the scribe was done at my direction and has been verified and validated by me.     Ronda Gale  12/02/18 2784       Ronda Gale  12/03/18 0009       Will Beard MD  12/03/18 8312

## 2018-12-03 NOTE — PLAN OF CARE
Problem: Patient Care Overview  Goal: Plan of Care Review  Outcome: Ongoing (interventions implemented as appropriate)   12/03/18 1347   Coping/Psychosocial   Plan of Care Reviewed With patient;spouse   Plan of Care Review   Progress (initial eval )       Problem: Stroke (Ischemic) (Adult)  Goal: Signs and Symptoms of Listed Potential Problems Will be Absent, Minimized or Managed (Stroke)  Outcome: Ongoing (interventions implemented as appropriate)   12/03/18 1347   Goal/Outcome Evaluation   Problems Assessed (Stroke (Ischemic)) cognitive impairment;communication impairment;eating/swallowing impairment   Problems Assessed (Stroke (Ischemic)) none   SLP evaluation completed. Will sign-off cog/comm difficulties. Please see note for further details and recommendations.

## 2018-12-03 NOTE — THERAPY EVALUATION
Acute Care - Speech Language Pathology Initial Evaluation  [facility]   Cognitive-Communication Evaluation       Patient Name: Shweta Gonsales  : 1965  MRN: [id number]  Today's Date: 12/3/2018  Onset of Illness/Injury or Date of Surgery: 18     Referring Physician: GISELE Swartz      Admit Date: 2018     Visit Dx:    ICD-10-CM ICD-9-CM   1. History of stroke in prior 3 months Z86.73 V12.54   2. Hypokalemia E87.6 276.8   3. Acute ischemic stroke (CMS/HCC) I63.9 434.91   4. Impaired mobility and ADLs Z74.09 799.89     Patient Active Problem List   Diagnosis   • Type 2 diabetes mellitus (CMS/HCC)   • Hyperlipemia   • Essential hypertension   • Rheumatoid arthritis (CMS/HCC)   • CVA (cerebral vascular accident) (CMS/HCC)     Past Medical History:   Diagnosis Date   • Diabetes mellitus (CMS/HCC)    • Hemorrhagic stroke (CMS/HCC)    • Hyperlipidemia    • Hypertension    • RA (rheumatoid arthritis) (CMS/HCC)    • Stroke (CMS/HCC)      Past Surgical History:   Procedure Laterality Date   • CHOLECYSTECTOMY     • FRACTURE SURGERY     • HYSTERECTOMY          SLP EVALUATION (last 72 hours)      SLP SLC Evaluation     Row Name 18 1230                   Communication Assessment/Intervention    Document Type  evaluation  -AV        Subjective Information  no complaints  -AV        Patient Observations  alert;cooperative  -AV        Patient/Family Observations   present   -AV        Patient Effort  good  -AV           General Information    Patient Profile Reviewed  yes  -AV        Pertinent History Of Current Problem  rule out CVA  -AV        Precautions/Limitations, Vision  WFL;for purposes of eval  -AV        Precautions/Limitations, Hearing  WFL;for purposes of eval  -AV        Prior Level of Function-Communication  WFL  -AV        Plans/Goals Discussed with  patient;spouse/S.O.  -AV        Barriers to Rehab  none identified  -AV        Patient's Goals for Discharge  return to home  -AV         Family Goals for Discharge  patient able to return to previous activities/roles  -AV           Pain Assessment    Additional Documentation  Pain Scale: FACES Pre/Post-Treatment (Group)  -AV           Pain Scale: FACES Pre/Post-Treatment    Pain: FACES Scale, Pretreatment  0-->no hurt  -AV        Pain: FACES Scale, Post-Treatment  0-->no hurt  -AV           Comprehension Assessment/Intervention    Comprehension Assessment/Intervention  Auditory Comprehension  -AV           Auditory Comprehension Assessment/Intervention    Auditory Comprehension (Communication)  WFL  -AV        Able to Identify Objects/Pictures (Communication)  WFL  -AV           Expression Assessment/Intervention    Expression Assessment/Intervention  verbal expression  -AV           Verbal Expression Assessment/Intervention    Verbal Expression  WFL  -AV           Oral Motor Structure and Function    Oral Motor Structure and Function  WFL  -AV        Dentition Assessment  natural, present and adequate  -AV        Mucosal Quality  moist, healthy  -AV           Oral Musculature and Cranial Nerve Assessment    Oral Motor General Assessment  WFL  -AV           Motor Speech Assessment/Intervention    Motor Speech Function  WFL  -AV           Cognitive Assessment Intervention- SLP    Cognitive Function (Cognition)  WFL  -AV        Orientation Status (Cognition)  WFL  -AV        Memory (Cognitive)  WFL  -AV        Attention (Cognitive)  WFL  -AV        Thought Organization (Cognitive)  WFL  -AV        Reasoning (Cognitive)  WFL  -AV        Problem Solving (Cognitive)  WFL  -AV        Functional Math (Cognitive)  WFL  -AV        Executive Function (Cognition)  WFL  -AV        Pragmatics (Communication)  WFL  -AV          User Key  (r) = Recorded By, (t) = Taken By, (c) = Cosigned By    Initials Name Effective Dates    AV Laura Rai, MS CCC-SLP 04/03/18 -              EDUCATION  The patient has been educated in the following areas:     Cognitive  Impairment Communication Impairment.    SLP Recommendation and Plan                                             Plan of Care Review  Plan of Care Reviewed With: patient, spouse  Plan of Care Reviewed With: patient, spouse  Progress: (initial eval )                    Time Calculation:     Time Calculation- SLP     Row Name 12/03/18 1348             Time Calculation- SLP    SLP Start Time  1230  -AV      SLP Received On  12/03/18  -AV        User Key  (r) = Recorded By, (t) = Taken By, (c) = Cosigned By    Initials Name Provider Type    AV Laura Rai, MS CCC-SLP Speech and Language Pathologist          Therapy Charges for Today     Code Description Service Date Service Provider Modifiers Qty    [id number]  ST EVAL SPEECH AND PROD W LANG  3 12/3/2018 Laura Rai, MS CCC-SLP GN 1                     Laura Rai MS CCC-SLP  12/3/2018

## 2018-12-03 NOTE — CONSULTS
Patient does not meet diabetes education order criteria (A1C 6.6%), therefore patient was not seen for diabetes education at this time.  Please re consult as needed.

## 2018-12-03 NOTE — PROGRESS NOTES
[facility] Medicine Services  PROGRESS NOTE    Patient Name: Shweta Gonsales  : 1965  MRN: [id number]    Date of Admission: 2018  Length of Stay: 0  Primary Care Physician: Jazmine Craig MD    Subjective   Subjective     CC:  Right sided weakness     HPI:  States she is feeling better. Neurological symptoms are resolved. Neuro eval and recommend ASA/statin.     Review of Systems  Gen- No fevers, chills  CV- No chest pain, palpitations  Resp- No cough, dyspnea  GI- No N/V/D, abd pain    Otherwise ROS is negative except as mentioned in the HPI.    Objective   Objective     Vital Signs:   Temp:  [97.6 °F (36.4 °C)-98.5 °F (36.9 °C)] 97.6 °F (36.4 °C)  Heart Rate:  [56-76] 69  Resp:  [16-18] 16  BP: (117-162)/(71-97) 127/73  Total (NIH Stroke Scale): 1     Physical Exam:  Constitutional: No acute distress, awake, alert  HENT: NCAT, mucous membranes moist  Respiratory: Clear to auscultation bilaterally, respiratory effort normal   Cardiovascular: RRR, no murmurs, rubs, or gallops, palpable pedal pulses bilaterally  Gastrointestinal: Positive bowel sounds, soft, nontender, nondistended  Musculoskeletal: No bilateral ankle edema  Psychiatric: Appropriate affect, cooperative  Neurologic: Oriented x 3, strength symmetric in all extremities, Cranial Nerves grossly intact to confrontation, speech clear  Skin: No rashes      Results Reviewed:  I have personally reviewed current lab, radiology, and data and agree.    Results from last 7 days   Lab Units  18   2132   WBC 10*3/mm3  8.98   HEMOGLOBIN g/dL  12.9   HEMATOCRIT %  37.5   PLATELETS 10*3/mm3  313   INR   1.04     Results from last 7 days   Lab Units  18   0616  18   2132   SODIUM mmol/L  139  140   POTASSIUM mmol/L  3.3*  3.2*   CHLORIDE mmol/L  102  105   CO2 mmol/L  30.0  34.0*   BUN mg/dL  14  18   CREATININE mg/dL  0.83  0.90   GLUCOSE mg/dL  94  108*   CALCIUM mg/dL  9.1  9.3   ALT (SGPT) U/L  18  19    AST (SGOT) U/L  17  16     Estimated Creatinine Clearance: 75.6 mL/min (by C-G formula based on SCr of 0.83 mg/dL).  No results found for: BNP    Microbiology Results Abnormal     None          Imaging Results (last 24 hours)     Procedure Component Value Units Date/Time    MRI Brain Without Contrast [[id number]] Collected:  12/02/18 2311     Updated:  12/03/18 0001    Addenda:        Note: Critical Findings were discussed with FELICITY Ly at 12/3/2018   11:58   AM EST. The report was understood and acknowledged by the healthcare   giver.    THIS DOCUMENT HAS BEEN ELECTRONICALLY SIGNED BY GAVIN MENSAH MD  Signed:  12/03/18 0000 by Gavin Mensah MD    Narrative:       EXAM:    MR Head Without Contrast     EXAM DATE/TIME:    12/2/2018 11:11 PM     CLINICAL HISTORY:    53 years old, female; Hypokalemia; Paresthesia of skin; Personal history of   transient ischemic attack (tia), and cerebral infarction without residual   deficits; Signs and symptoms; Weakness, extremity; Right; Patient HX: Right   sided heaviness, right sided deficits from stroke 10/5/18; Additional info: R   sided heaviness     TECHNIQUE:    MR of the head without contrast.     COMPARISON:    No relevant prior studies available.     FINDINGS:    Brain:  Small acute diffusion abnormality in left parietal periventricular   white matter, consistent with acute small infarct. Chronic mild microvascular   ischemic disease of bilateral cerebral white matter. Chronic-appearing   trianglar-shaped low signal at left thalamus likely to be old infarct and/or   calcification. Bilateral cerebellopontine angle structures are unremarkable. No   downward herniation, midline shift of brain, or acute mass effect.  No acute   intracranial hemorrhage visualized.   Ventricles:  No hydrocephalus. Unremarkable.    Bones/joints: No acute abnormality.  Minimal osteophytes at cervical spine.   Soft tissues:  Soft tissue swelling of nasal turbinates.     Sinuses:  Mild bilateral ethmoid and right maxillary sinusitis.   Mastoid air cells:  No mastoid effusion.    Orbits:  Orbital structures are unremarkable, as visualized.    Nasopharynx:  Deviation of the nasal septum.       Impression:       1. Small acute infarct in left parietal periventricular white matter.   2. Chronic mild microvascular ischemic disease of bilateral cerebral white   matter.   3. Suspect chronic left thalamic infarct.   4.  No acute intracranial hemorrhage visualized.  5.  Mild bilateral ethmoid and right maxillary sinusitis.    THIS DOCUMENT HAS BEEN ELECTRONICALLY SIGNED BY GAVIN MENSAH MD    XR Chest 1 View [[id number]] Collected:  12/02/18 2145     Updated:  12/02/18 2314    Narrative:       EXAM:    XR Chest, 1 View     EXAM DATE/TIME:    12/2/2018 9:45 PM     CLINICAL HISTORY:    53 years old, female; Hypokalemia; Paresthesia of skin; Personal history of   transient ischemic attack (tia), and cerebral infarction without residual   deficits; Signs and symptoms; Cough     TECHNIQUE:    XR of the chest, 1 view.     COMPARISON:    No relevant prior studies available.     FINDINGS:    Lungs:  No acute infiltrates. No pneumonia or CHF.    Pleural space:  No pneumothorax. No pleural effusion.    Heart/Mediastinum:  Normal heart size. No acute mediastinal abnormality   visualized.    Bones/joints:  No acute abnormality seen along the well-visualized osseous   structures.    Soft tissues:  RUQ surgical clips.       Impression:       No acute cardiopulmonary disease demonstrated.     THIS DOCUMENT HAS BEEN ELECTRONICALLY SIGNED BY GAVIN MENSAH MD        Results for orders placed during the hospital encounter of 12/02/18   Adult Transthoracic Echo Complete W/ Cont if Necessary Per Protocol (With Agitated Saline)    Narrative · Left ventricular systolic function is normal.  · Estimated EF appears to be in the range of 61 - 65%.  · Left ventricular wall thickness is consistent with borderline  concentric   hypertrophy. Left ventricular diastolic function is normal.  · No evidence of a patent foramen ovale. Saline test results are negative.  · Calculated right ventricular systolic pressure from tricuspid   regurgitation is 30 mmHg.          I have reviewed the medications.      amLODIPine 5 mg Oral Q24H   aspirin 325 mg Oral Daily   Or      aspirin 300 mg Rectal Daily   atorvastatin 80 mg Oral Nightly   insulin lispro 0-7 Units Subcutaneous 4x Daily With Meals & Nightly   sodium chloride 3 mL Intravenous Q12H         Assessment/Plan   Assessment / Plan     Active Hospital Problems    Diagnosis   • **CVA (cerebral vascular accident) (CMS/[facility])   • Type 2 diabetes mellitus (CMS/[facility])   • Hyperlipemia   • Essential hypertension   • Rheumatoid arthritis (CMS/[facility])          Brief Hospital Course to date:  -- Acute ischemic lacunar CVA:  - with recent hemorrhagic stroke less than 3 months ago  - administered ASA in ED. Continue daily for now and monitor closely  - hold home statin and add high intensity statin  - ECHO with LV[facility], negative for PFO; carotid without significant stenosis   - PT/OT/SLP treat and eval  - Neurology eval; continue ASA/statin      -- DM2:  - non insulin dependent. Old oral hypoglycemics for now  - add SSI and accu checks  - A1C 6.6%     --  Hypertension:  - borderline, but stable and controlled.  - Resume home amlodipine and HCTZ - add K supplement     -- RA:  - does not appear to currently be on immunosuppressive therapy      DVT prophylaxis: mechanical          CODE STATUS:   Code Status and Medical Interventions:   Ordered at: 12/03/18 0021     Level Of Support Discussed With:    Patient     Code Status:    CPR     Medical Interventions (Level of Support Prior to Arrest):    Full       Disposition: I expect the patient to be discharged home 1-2 days       Electronically signed by Rupal Alford DO, 12/03/18, 3:24 PM.

## 2018-12-03 NOTE — PROGRESS NOTES
Discharge Planning Assessment  [facility]     Patient Name: Shweta Gonslaes  MRN: [id number]  Today's Date: 12/3/2018    Admit Date: 12/2/2018    Discharge Needs Assessment     Row Name 12/03/18 1603       Living Environment    Lives With  spouse    Name(s) of Who Lives With Patient  Mitchell Gonsales [phone]    Current Living Arrangements  home/apartment/condo    Primary Care Provided by  self    Provides Primary Care For  no one, unable/limited ability to care for self    Family Caregiver if Needed  spouse    Quality of Family Relationships  helpful;involved    Able to Return to Prior Arrangements  yes       Resource/Environmental Concerns    Resource/Environmental Concerns  none    Transportation Concerns  car, none       Transition Planning    Patient/Family Anticipates Transition to  home with family    Transportation Anticipated  family or friend will provide       Discharge Needs Assessment    Readmission Within the Last 30 Days  no previous admission in last 30 days    Concerns to be Addressed  basic needs;discharge planning    Equipment Currently Used at Home  none    Anticipated Changes Related to Illness  inability to work    Equipment Needed After Discharge  none    Outpatient/Agency/Support Group Needs  outpatient therapy    Discharge Facility/Level of Care Needs  outpatient therapy        Discharge Plan     Row Name 12/03/18 1603       Plan    Plan  Home with outpatient PT    Patient/Family in Agreement with Plan  yes    Plan Comments  I met with Mrs Gonsales and  at bedside to intiate d/c planning. Her plan/goal is to return home. She lives with . She was independent with most ADL's prior to admit. States she has not worked or driven since her CVA in October. She was in process of being evaluated at [facility]  ([facility] She does not use any equipment. Await  therapy evals to determine any d/c needs.    Final Discharge Disposition Code  01 - home or self-care         Destination      No service coordination in this encounter.      Durable Medical Equipment      No service coordination in this encounter.      Dialysis/Infusion      No service coordination in this encounter.      Home Medical Care      No service coordination in this encounter.      Community Resources      No service coordination in this encounter.          Demographic Summary     Row Name 12/03/18 1559       General Information    Admission Type  inpatient    Arrived From  emergency department    Referral Source  physician    Reason for Consult  discharge planning    Preferred Language  English    General Information Comments  PCP- Jazmine Craig       Contact Information    Permission Granted to Share Info With  ;family/designee        Functional Status     Row Name 12/03/18 1600       Functional Status    Usual Activity Tolerance  good       Functional Status, IADL    Medications  independent    Meal Preparation  independent    Housekeeping  independent    Laundry  independent    Shopping  assistive person       Mental Status    General Appearance WDL  WDL       Mental Status Summary    Recent Changes in Mental Status/Cognitive Functioning  unable to assess       Employment/    Employment Status  unemployed    Current or Previous Occupation  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       Abuse/Neglect    No documentation.       Legal    No documentation.       Substance Abuse    No documentation.       Patient Forms    No documentation.           Sonja C Kellerman, RN

## 2018-12-03 NOTE — PLAN OF CARE
Problem: Patient Care Overview  Goal: Plan of Care Review  Outcome: Outcome(s) achieved Date Met: 12/03/18 12/03/18 7724   Coping/Psychosocial   Plan of Care Reviewed With patient   Plan of Care Review   Progress (Evaluation)   OTHER   Outcome Summary OT eval complete. Pt with right  slightly weaker than L.  Issued RUE HEP and pt gave return demo.   Pt is indpendent with donning/doffing socks, independent with bed mobility and STS. No problems are identified which require further skilled OT. Will d/c pt from OT at this time as pt is functioning at baseline with self care. Recommend home upon d/c. Please reconsult if status changes.

## 2018-12-03 NOTE — PLAN OF CARE
"Problem: Patient Care Overview  Goal: Plan of Care Review  Outcome: Ongoing (interventions implemented as appropriate)   12/03/18 0824   Coping/Psychosocial   Plan of Care Reviewed With patient;spouse   Plan of Care Review   Progress no change   OTHER   Outcome Summary patient had an uneventful night. rested comfortably. Feels bad that having \"this problem again and not getting better\".  redirected thoughts stating look where you were 2 months ago\". this improved spirit and calmed patient. NIHSS was 1.       Problem: Stroke (Ischemic) (Adult)  Goal: Signs and Symptoms of Listed Potential Problems Will be Absent, Minimized or Managed (Stroke)  Outcome: Ongoing (interventions implemented as appropriate)      Problem: Fall Risk (Adult)  Goal: Identify Related Risk Factors and Signs and Symptoms  Outcome: Ongoing (interventions implemented as appropriate)    Goal: Absence of Fall  Outcome: Ongoing (interventions implemented as appropriate)        "

## 2018-12-03 NOTE — PLAN OF CARE
"Problem: Patient Care Overview  Goal: Plan of Care Review  Outcome: Ongoing (interventions implemented as appropriate)   12/03/18 1843   Coping/Psychosocial   Plan of Care Reviewed With patient   Plan of Care Review   Progress improving   OTHER   Outcome Summary Presents w/ evolving symptoms, incl. new isch. CVA w/ incr. R extrem \"heaviness/numbness\"(baseline R HP s/p recent CVA), elev BP, global HA, dec K +, dec. coord/bal. & impaired funct mobil; able to amb 450 ft & on10 steps w/ CGA, + dyn. sit/stand bal. & gt coord exer, but noted signif elev BP, fluct.sinus catracho, fatigue & dizziness s/p MINI squats; pt. decl. f/u PT at d/c (prefers HEP & working w/ staff at [facility])        Problem: Stroke (Ischemic) (Adult)  Goal: Signs and Symptoms of Listed Potential Problems Will be Absent, Minimized or Managed (Stroke)  Outcome: Ongoing (interventions implemented as appropriate)   12/03/18 1273   Goal/Outcome Evaluation   Problems Assessed (Stroke (Ischemic)) motor/sensory impairment   Problems Assessed (Stroke (Ischemic)) motor/sensory impairment         "

## 2018-12-03 NOTE — THERAPY DISCHARGE NOTE
Acute Care - Occupational Therapy Initial Eval/Discharge  [facility]     Patient Name: Shweta Gonsales  : 1965  MRN: [id number]  Today's Date: 12/3/2018  Onset of Illness/Injury or Date of Surgery: 18  Date of Referral to OT: 18  Referring Physician: GISELE Swartz      Admit Date: 2018       ICD-10-CM ICD-9-CM   1. History of stroke in prior 3 months Z86.73 V12.54   2. Hypokalemia E87.6 276.8   3. Acute ischemic stroke (CMS/HCC) I63.9 434.91   4. Impaired mobility and ADLs Z74.09 799.89     Patient Active Problem List   Diagnosis   • Type 2 diabetes mellitus (CMS/HCC)   • Hyperlipemia   • Essential hypertension   • Rheumatoid arthritis (CMS/HCC)   • CVA (cerebral vascular accident) (CMS/HCC)     Past Medical History:   Diagnosis Date   • Diabetes mellitus (CMS/HCC)    • Hemorrhagic stroke (CMS/HCC)    • Hyperlipidemia    • Hypertension    • RA (rheumatoid arthritis) (CMS/HCC)    • Stroke (CMS/HCC)      Past Surgical History:   Procedure Laterality Date   • CHOLECYSTECTOMY     • FRACTURE SURGERY     • HYSTERECTOMY            OT ASSESSMENT FLOWSHEET (last 72 hours)      Occupational Therapy Evaluation     Row Name 18 0740                   OT Evaluation Time/Intention    Subjective Information  complains of heaviness in RLE, decreased sensation in RUE  -AC        Document Type  discharge evaluation/summary  -AC        Mode of Treatment  occupational therapy  -AC        Patient Effort  excellent  -AC        Symptoms Noted During/After Treatment  none  -AC           General Information    Patient Profile Reviewed?  yes  -AC        Onset of Illness/Injury or Date of Surgery  18  -AC        Referring Physician  GISELE Swartz  -        Patient Observations  alert;cooperative;agree to therapy  -AC        Patient/Family Observations  No family present  -AC        General Observations of Patient  Pt received sitting up in bed  -AC        Prior Level of Function  independent:;all household  "mobility;ADL's  -AC        Equipment Currently Used at Home  walker, rolling;shower chair used just after CVA 10/2018, was not using just prior admit  -AC        Pertinent History of Current Functional Problem  Pt admit with R sided heaviness, weakness with global onset of headache.  MRI revealed a new acute ischemic stroke in the left parietal periventricular matter.  Pt with CVA 10/2018 with residual R sided heaviness  -AC        Existing Precautions/Restrictions  -- standard, monitor BP  -AC        Risks Reviewed  patient:;change in vital signs;LOB  -AC        Benefits Reviewed  patient:;improve function;increase knowledge  -AC        Barriers to Rehab  -- previous CVA with residual R sided \"heaviness\"  -AC           Relationship/Environment    Primary Source of Support/Comfort  spouse;child(alan) 18yo dtr  -AC        Lives With  spouse;child(alan), adult  -AC           Resource/Environmental Concerns    Current Living Arrangements  home/apartment/condo  -AC           Home Main Entrance    Number of Stairs, Main Entrance  three  -AC        Stair Railings, Main Entrance  none  -AC           Cognitive Assessment/Intervention- PT/OT    Orientation Status (Cognition)  oriented x 4  -AC        Follows Commands (Cognition)  WNL  -AC           Bed Mobility Assessment/Treatment    Bed Mobility Assessment/Treatment  supine-sit;sit-supine  -AC        Supine-Sit [place] (Bed Mobility)  conditional independence  -AC        Assistive Device (Bed Mobility)  bed rails  -AC           Functional Mobility    Functional Mobility- Ind. Level  independent  -AC        Functional Mobility-Distance (Feet)  20  -AC           Transfer Assessment/Treatment    Transfer Assessment/Treatment  sit-stand transfer;stand-sit transfer  -AC           Sit-Stand Transfer    Sit-Stand [place] (Transfers)  independent  -AC           Stand-Sit Transfer    Stand-Sit [place] (Transfers)  independent  -AC           ADL Assessment/Intervention "    38556 - OT Self Care/Mgmt Minutes  --  -AC        BADL Assessment/Intervention  lower body dressing  -AC           Lower Body Dressing Assessment/Training    Lower Body Dressing Robbins Level  doff;don;socks;independent  -AC        Lower Body Dressing Position  long sitting  -           General ROM    GENERAL ROM COMMENTS  BUE WFL  -AC           MMT (Manual Muscle Testing)    General MMT Comments  BUE grossly 4+/5 with R  slightly weaker than L  -AC           Motor Assessment/Interventions    Additional Documentation  Balance (Group);Balance Interventions (Group);Gross Motor Coordination (Group);Fine Motor Testing & Training (Group);Therapeutic Exercise (Group);Therapeutic Exercise Interventions (Group)  -           Therapeutic Exercise    68683 - OT Therapeutic Exercise Minutes  3  -AC           Therapeutic Exercise    Comment (Therapeutic Exercise)  Issued R sponge block HEP and pt gave return demo  -AC           Gross Motor Coordination    Gross Motor Impairments  coordination;finger to nose;rapid alternating intact  -AC           Balance    Balance  static sitting balance;static standing balance;dynamic sitting balance;dynamic standing balance  -AC           Static Sitting Balance    Level of Robbins (Unsupported Sitting, Static Balance)  independent  -AC           Dynamic Sitting Balance    Level of Robbins, Reaches Outside Midline (Sitting, Dynamic Balance)  independent  -AC           Static Standing Balance    Level of [place] (Supported Standing, Static Balance)  independent  -AC           Fine Motor Testing & Training    Comment, Fine Motor Coordination  -- intact in hand manipulation and opposition  -AC           Sensory Assessment/Intervention    Sensory General Assessment  -- light touch intact  -AC           Positioning and Restraints    Pre-Treatment Position  in bed  -AC        Post Treatment Position  bed  -AC        In Bed  fowlers;call light within reach;encouraged to  "call for assist;exit alarm on  -AC           Pain Assessment    Additional Documentation  Pain Scale: Numbers Pre/Post-Treatment (Group)  -AC           Pain Scale: Numbers Pre/Post-Treatment    Pain Scale: Numbers, Pretreatment  0/10 - no pain  -AC        Pain Scale: Numbers, Post-Treatment  0/10 - no pain  -AC           Plan of Care Review    Plan of Care Reviewed With  patient  -AC           Clinical Impression (OT)    Date of Referral to OT  12/03/18  -AC        OT Diagnosis  pt at baseline with self care; eval only  -AC        Functional Level at Time of Evaluation (OT Eval)  demo ability to complete ADLs independently  -AC        Patient/Family Goals Statement (OT Eval)  Return home; decrease LE feeling of \"heaviness\"  -AC        Criteria for Skilled Therapeutic Interventions Met (OT Eval)  no;no problems identified which require skilled intervention  -AC        Therapy Frequency (OT Eval)  evaluation only  -AC        Care Plan Review (OT)  evaluation/treatment results reviewed  -AC        Anticipated Discharge Disposition (OT)  home  -AC           Vital Signs    Pre Systolic BP Rehab  147  -AC        Pre Treatment Diastolic BP  94  -AC        Post Systolic BP Rehab  138  -AC        Post Treatment Diastolic BP  37  -AC        Pretreatment Heart Rate (beats/min)  60  -AC        Intratreatment Heart Rate (beats/min)  67  -AC        Posttreatment Heart Rate (beats/min)  62  -AC        Pre SpO2 (%)  97  -AC        O2 Delivery Pre Treatment  room air  -AC        Post SpO2 (%)  98  -AC        O2 Delivery Post Treatment  room air  -AC        Pre Patient Position  Supine  -AC        Intra Patient Position  Standing  -AC        Post Patient Position  Supine  -AC           Discharge Summary (Occupational Therapy)    Additional Documentation  Discharge Summary, OT Eval (Group)  -AC           Discharge Summary, OT Eval    Reason for Discharge (OT Discharge Summary)  no further needs identified  -AC           Living " Environment    Home Accessibility  stairs to enter home walk in shower  -          User Key  (r) = Recorded By, (t) = Taken By, (c) = Cosigned By    Initials Name Effective Dates     Radha Woods OT 06/23/15 -           Occupational Therapy Education     Title: PT OT SLP Therapies (In Progress)     Topic: Occupational Therapy (In Progress)     Point: ADL training (Done)     Description: Instruct learner(s) on proper safety adaptation and remediation techniques during self care or transfers.   Instruct in proper use of assistive devices.    Learning Progress Summary           Patient Acceptance, E,TB,H,D, VU,DU by  at 12/3/2018  9:25 AM    Comment:  benefits of activity, role of OT,  UE HEP     by  at 12/3/2018  8:58 AM    Comment:                                 User Key     Initials Effective Dates Name Provider Type Discipline     06/23/15 -  Radha Woods, OT Occupational Therapist OT                OT Recommendation and Plan  Outcome Summary/Treatment Plan (OT)  Anticipated Discharge Disposition (OT): home  Reason for Discharge (OT Discharge Summary): no further needs identified  Therapy Frequency (OT Eval): evaluation only  Plan of Care Review  Plan of Care Reviewed With: patient  Plan of Care Reviewed With: patient  Outcome Summary: OT eval complete. Pt reports a difference in sensation in R UE and states R feels weaker than left, but this is not impeding her performance with ADLs, and strength is grossly 4+/5 bilaterally.     Pt is indpendent with donning/doffing socks, independent with bed mobility and STS.  No problems are identified which require further skilled OT. Will d/c pt from OT at this time as pt is functioning at baseline with self care. Recommend home upon d/c.   Please reconsult if status changes.          Outcome Measures     Row Name 12/03/18 0800 12/03/18 0740          How much help from another is currently needed...    Putting on and taking off regular lower body clothing?  --   4  -AC     Bathing (including washing, rinsing, and drying)  --  4  -AC     Toileting (which includes using toilet bed pan or urinal)  --  4  -AC     Putting on and taking off regular upper body clothing  --  4  -AC     Taking care of personal grooming (such as brushing teeth)  --  4  -AC     Eating meals  --  4  -AC     Score  --  24  -AC        Modified Hall Scale    Modified Hall Scale  --  1 - No significant disability despite symptoms.  Able to carry out all usual duties and activities.  -AC        Functional Assessment    Outcome Measure Options  Modified [place]  -AC  AM-PAC 6 Clicks Daily Activity (OT)  -AC       User Key  (r) = Recorded By, (t) = Taken By, (c) = Cosigned By    Initials Name Provider Type    Radha August, OT Occupational Therapist          Time Calculation:   Time Calculation- OT     Row Name 12/03/18 0740             Time Calculation- OT    OT Start Time  0740  -      Total Timed Code Minutes- OT  0 minute(s)  -AC      OT Received On  12/03/18  -         Timed Charges    05108 - OT Therapeutic Exercise Minutes  3  -AC      70100 - OT Self Care/Mgmt Minutes  --  -AC        User Key  (r) = Recorded By, (t) = Taken By, (c) = Cosigned By    Initials Name Provider Type    Radha August, OT Occupational Therapist        Therapy Suggested Charges     Code   Minutes Charges    83029 (CPT®) Hc Ot Neuromusc Re Education Ea 15 Min      84739 (CPT®) Hc Ot Ther Proc Ea 15 Min 3     07533 (CPT®) Hc Ot Therapeutic Act Ea 15 Min      57718 (CPT®) Hc Ot Manual Therapy Ea 15 Min      74959 (CPT®) Hc Ot Iontophoresis Ea 15 Min      07783 (CPT®) Hc Ot Elec Stim Ea-Per 15 Min      90340 (CPT®) Hc Ot Ultrasound Ea 15 Min      83250 (CPT®) Hc Ot Self Care/Mgmt/Train Ea 15 Min      Total  3         Therapy Charges for Today     Code Description Service Date Service Provider Modifiers Qty    58028695117 HC OT EVAL LOW COMPLEXITY 4 12/3/2018 Radha Woods, OT GO 1               OT Discharge  Summary  Anticipated Discharge Disposition (OT): home  Reason for Discharge: Independent, At baseline function  Outcomes Achieved: Other(No goals established; pt is able to complete all normal duties and activities)  Discharge Destination: Home    Radha Woods, OT  12/3/2018

## 2018-12-03 NOTE — CONSULTS
Neurology    Referring provider: Dr. Awad  No referring provider defined for this encounter.    Reason for Consultation: Stroke    Chief complaint: Right-sided weakness    History of present illness:  Pleasant 53-year-old -American seen for Dr. Awad for evaluation of stroke.    She presented with the acute onset of right-sided weakness yesterday and was seen to have a small lacunar infarct in her left hemisphere which appeared acute.    He was recently hospitalized in [facility] and for brain hemorrhage and was not placed on aspirin at that time.    She been on Lipitor 40 mg daily.    She is treated blood pressure is doing well with overpressure.    She does not smoke.        Review of Systems: She denies headache slurred speech or difficulty swallowing.    She had no visual changes.    All other systems reviewed and are negative.        Home meds:   Medications Prior to Admission   Medication Sig Dispense Refill Last Dose   • amLODIPine (NORVASC) 5 MG tablet Take 5 mg by mouth Daily.   12/2/2018 at Unknown time   • atorvastatin (LIPITOR) 40 MG tablet Take 40 mg by mouth Every Night.  0 12/2/2018 at Unknown time   • glipiZIDE (GLUCOTROL) 5 MG tablet Take 5 mg by mouth Daily.   12/2/2018 at Unknown time   • hydrochlorothiazide (HYDRODIURIL) 25 MG tablet Take 25 mg by mouth Daily.   12/2/2018 at Unknown time   • losartan (COZAAR) 50 MG tablet Take 50 mg by mouth Daily.   12/2/2018 at Unknown time   • SITagliptin (JANUVIA) 50 MG tablet Take 50 mg by mouth Daily.   12/2/2018 at Unknown time       History  Past Medical History:   Diagnosis Date   • Diabetes mellitus (CMS/HCC)    • Hemorrhagic stroke (CMS/HCC)    • Hyperlipidemia    • Hypertension    • RA (rheumatoid arthritis) (CMS/HCC)    • Stroke (CMS/HCC)    ,   Past Surgical History:   Procedure Laterality Date   • CHOLECYSTECTOMY     • FRACTURE SURGERY     • HYSTERECTOMY     , History reviewed. No pertinent family history.,   Social History  "    Tobacco Use   • Smoking status: Never Smoker   Substance Use Topics   • Alcohol use: No     Frequency: Never   • Drug use: No    and Allergies:  Patient has no known allergies.,    Vital Signs   Blood pressure 127/73, pulse 69, temperature 97.6 °F (36.4 °C), temperature source Oral, resp. rate 16, height 157.5 cm (62\"), weight 75.9 kg (167 lb 6.4 oz), SpO2 100 %.  Body mass index is 30.62 kg/m².    Physical Exam:   General: Pleasant -American woman in no distress              Head: Atraumatic              Neck: No bruits              Resp: Normal breath sounds              Cor: Regular rhythm              Extr: No edema              Skin: Warm and dry               Neuro: Mentally alert and oriented with normal memory attention and concentration.    Speech is articulate with no word finding problems.    Coordination is normal finger to nose testing.    Nerves show benign fundi equal pupils full eye movements.    Facial movement sensation are normal.    Reflexes show the crossed adductors in the lower extremities and tapping the right.  The right side is slightly more than the left and 1+ bilaterally.    Motor testing shows normal power tone in all muscle groups.    Sensory testing is normal.        Results Review: MRI of the brain was personally reviewed and shows an old thalamic Area of encephalomalacia compatible with previous brain hemorrhage.  No blood is noted.    There is a small acute the periventricular lesion seen in 2 images on the diffusion study.    LDL was 50.    Hemoglobin A1c is 6.6.        Labs:  Lab Results (last 72 hours)     Procedure Component Value Units Date/Time    POC Glucose Once [[id number]]  (Normal) Collected:  12/03/18 1152    Specimen:  Blood Updated:  12/03/18 1156     Glucose 119 mg/dL     POC Glucose Once [[id number]]  (Normal) Collected:  12/03/18 0753    Specimen:  Blood Updated:  12/03/18 0807     Glucose 102 mg/dL     Hemoglobin A1c [[id number]]  (Abnormal) Collected:  " 12/03/18 0616    Specimen:  Blood Updated:  12/03/18 0805     Hemoglobin A1C 6.60 %     Narrative:       The American Diabetes Association recommends maintenance of Hemoglobin A1C at 7.0% or lower. Goals for Hemoglobin A1C reduction may need to be modified if hypoglycemia is a problem.    Lipid Panel [[id number]]  (Abnormal) Collected:  12/03/18 0616    Specimen:  Blood Updated:  12/03/18 0704     Total Cholesterol 93 mg/dL      Triglycerides 53 mg/dL      HDL Cholesterol 30 mg/dL      LDL Cholesterol  50 mg/dL     Narrative:       Cholesterol Reference Ranges:   Desirable       < 200 mg/dL   Borderline    200-239 mg/dL   High Risk       > 239 mg/dL    Triglyceride Reference Ranges:   Normal          < 150 mg/dL   Borderline    150-199 mg/dL   High          200-499 mg/dL   Very High       > 499 mg/dL    HDL Reference Ranges:   Low              < 40 mg/dL   High             > 59 mg/dL    LDL Reference Ranges:   Optimal         < 100 mg/dL   Near Optimal  100-129 mg/dL   Borderline    130-159 mg/dL   High          160-189 mg/dL   Very High       > 189 mg/dL    Comprehensive Metabolic Panel [[id number]]  (Abnormal) Collected:  12/03/18 0616    Specimen:  Blood Updated:  12/03/18 0704     Glucose 94 mg/dL      BUN 14 mg/dL      Creatinine 0.83 mg/dL      Sodium 139 mmol/L      Potassium 3.3 mmol/L      Chloride 102 mmol/L      CO2 30.0 mmol/L      Calcium 9.1 mg/dL      Total Protein 7.0 g/dL      Albumin 4.00 g/dL      ALT (SGPT) 18 U/L      AST (SGOT) 17 U/L      Alkaline Phosphatase 103 U/L      Total Bilirubin 0.5 mg/dL      eGFR   Amer 87 mL/min/1.73      Globulin 3.0 gm/dL      A/G Ratio 1.3 g/dL      BUN/Creatinine Ratio 16.9     Anion Gap 7.0 mmol/L     Narrative:       National Kid[employer] Foundation Guidelines    Stage     Description        GFR  1         Normal or High     90+  2         Mild decrease      60-89  3         Moderate decrease  30-59  4         Severe decrease    15-29  5         Kidney failure      <15    The MDRD GFR formula is only valid for adults with stable renal function between ages 18 and 70.    POC Glucose Once [[id number]]  (Normal) Collected:  12/03/18 0539    Specimen:  Blood Updated:  12/03/18 0541     Glucose 97 mg/dL     POC Glucose Once [[id number]]  (Normal) Collected:  12/03/18 0224    Specimen:  Blood Updated:  12/03/18 0234     Glucose 100 mg/dL     Urinalysis With Microscopic If Indicated (No Culture) - Urine, Clean Catch [[id number]]  (Abnormal) Collected:  12/02/18 2352    Specimen:  Urine, Clean Catch Updated:  12/03/18 0007     Color, UA Yellow     Appearance, UA Clear     pH, UA 6.5     Specific Gravity, UA 1.020     Glucose, UA Negative     Ketones, UA Negative     Bilirubin, UA Negative     Blood, UA Negative     Protein, UA Negative     Leuk Esterase, UA Trace     Nitrite, UA Negative     Urobilinogen, UA 0.2 E.U./dL    Urinalysis, Microscopic Only - Urine, Clean Catch [[id number]]  (Abnormal) Collected:  12/02/18 2352    Specimen:  Urine, Clean Catch Updated:  12/03/18 0007     RBC, UA 0-2 /HPF      WBC, UA 3-5 /HPF      Bacteria, UA None Seen /HPF      Squamous Epithelial Cells, UA 3-6 /HPF      Hyaline Casts, UA 0-6 /LPF      Methodology Automated Microscopy    Comprehensive Metabolic Panel [[id number]]  (Abnormal) Collected:  12/02/18 2132    Specimen:  Blood Updated:  12/02/18 2158     Glucose 108 mg/dL      BUN 18 mg/dL      Creatinine 0.90 mg/dL      Sodium 140 mmol/L      Potassium 3.2 mmol/L      Chloride 105 mmol/L      CO2 34.0 mmol/L      Calcium 9.3 mg/dL      Total Protein 7.7 g/dL      Albumin 4.39 g/dL      ALT (SGPT) 19 U/L      AST (SGOT) 16 U/L      Alkaline Phosphatase 114 U/L      Total Bilirubin 0.4 mg/dL      eGFR  African Amer 80 mL/min/1.73      Globulin 3.3 gm/dL      A/G Ratio 1.3 g/dL      BUN/Creatinine Ratio 20.0     Anion Gap 1.0 mmol/L     Narrative:       National Kidney Foundation Guidelines    Stage     Description        GFR  1         Normal or  High     90+  2         Mild decrease      60-89  3         Moderate decrease  30-59  4         Severe decrease    15-29  5         Kidney failure     <15    The MDRD GFR formula is only valid for adults with stable renal function between ages 18 and 70.    Protime-INR [[id number]]  (Normal) Collected:  12/02/18 2132    Specimen:  Blood Updated:  12/02/18 2153     Protime 10.9 Seconds      INR 1.04    CBC & Differential [[id number]] Collected:  12/02/18 2132    Specimen:  Blood Updated:  12/02/18 2138    Narrative:       The following orders were created for panel order CBC & Differential.  Procedure                               Abnormality         Status                     ---------                               -----------         ------                     CBC Auto Differential[[id number]]        Normal              Final result                 Please view results for these tests on the individual orders.    CBC Auto Differential [[id number]]  (Normal) Collected:  12/02/18 2132    Specimen:  Blood Updated:  12/02/18 2138     WBC 8.98 10*3/mm3      RBC 4.34 10*6/mm3      Hemoglobin 12.9 g/dL      Hematocrit 37.5 %      MCV 86.4 fL      MCH 29.7 pg      MCHC 34.4 g/dL      RDW 13.5 %      RDW-SD 42.4 fl      MPV 10.3 fL      Platelets 313 10*3/mm3      Neutrophil % 61.7 %      Lymphocyte % 27.6 %      Monocyte % 9.5 %      Eosinophil % 1.0 %      Basophil % 0.2 %      Immature Grans % 0.2 %      Neutrophils, Absolute 5.54 10*3/mm3      Lymphocytes, Absolute 2.48 10*3/mm3      Monocytes, Absolute 0.85 10*3/mm3      Eosinophils, Absolute 0.09 10*3/mm3      Basophils, Absolute 0.02 10*3/mm3      Immature Grans, Absolute 0.02 10*3/mm3           Rads:  Imaging Results (last 72 hours)     Procedure Component Value Units Date/Time    MRI Brain Without Contrast [[id number]] Collected:  12/02/18 2311     Updated:  12/03/18 0001    Addenda:        Note: Critical Findings were discussed with FELICITY Ly at 12/3/2018   11:58    AM EST. The report was understood and acknowledged by the healthcare   giver.    THIS DOCUMENT HAS BEEN ELECTRONICALLY SIGNED BY GAVIN MENSAH MD  Signed:  12/03/18 0000 by Gavin Mensah MD    Narrative:       EXAM:    MR Head Without Contrast     EXAM DATE/TIME:    12/2/2018 11:11 PM     CLINICAL HISTORY:    53 years old, female; Hypokalemia; Paresthesia of skin; Personal history of   transient ischemic attack (tia), and cerebral infarction without residual   deficits; Signs and symptoms; Weakness, extremity; Right; Patient HX: Right   sided heaviness, right sided deficits from stroke 10/5/18; Additional info: R   sided heaviness     TECHNIQUE:    MR of the head without contrast.     COMPARISON:    No relevant prior studies available.     FINDINGS:    Brain:  Small acute diffusion abnormality in left parietal periventricular   white matter, consistent with acute small infarct. Chronic mild microvascular   ischemic disease of bilateral cerebral white matter. Chronic-appearing   trianglar-shaped low signal at left thalamus likely to be old infarct and/or   calcification. Bilateral cerebellopontine angle structures are unremarkable. No   downward herniation, midline shift of brain, or acute mass effect.  No acute   intracranial hemorrhage visualized.   Ventricles:  No hydrocephalus. Unremarkable.    Bones/joints: No acute abnormality.  Minimal osteophytes at cervical spine.   Soft tissues:  Soft tissue swelling of nasal turbinates.    Sinuses:  Mild bilateral ethmoid and right maxillary sinusitis.   Mastoid air cells:  No mastoid effusion.    Orbits:  Orbital structures are unremarkable, as visualized.    Nasopharynx:  Deviation of the nasal septum.       Impression:       1. Small acute infarct in left parietal periventricular white matter.   2. Chronic mild microvascular ischemic disease of bilateral cerebral white   matter.   3. Suspect chronic left thalamic infarct.   4.  No acute intracranial  hemorrhage visualized.  5.  Mild bilateral ethmoid and right maxillary sinusitis.    THIS DOCUMENT HAS BEEN ELECTRONICALLY SIGNED BY GAVIN MENSAH MD    XR Chest 1 View [[id number]] Collected:  12/02/18 2145     Updated:  12/02/18 2314    Narrative:       EXAM:    XR Chest, 1 View     EXAM DATE/TIME:    12/2/2018 9:45 PM     CLINICAL HISTORY:    53 years old, female; Hypokalemia; Paresthesia of skin; Personal history of   transient ischemic attack (tia), and cerebral infarction without residual   deficits; Signs and symptoms; Cough     TECHNIQUE:    XR of the chest, 1 view.     COMPARISON:    No relevant prior studies available.     FINDINGS:    Lungs:  No acute infiltrates. No pneumonia or CHF.    Pleural space:  No pneumothorax. No pleural effusion.    Heart/Mediastinum:  Normal heart size. No acute mediastinal abnormality   visualized.    Bones/joints:  No acute abnormality seen along the well-visualized osseous   structures.    Soft tissues:  RUQ surgical clips.       Impression:       No acute cardiopulmonary disease demonstrated.     THIS DOCUMENT HAS BEEN ELECTRONICALLY SIGNED BY GAVIN MENSAH MD            Assessment: Lacunar stroke left hemisphere, acute    Recent left thalamic hemorrhage.    High blood pressure.    Diabetes mellitus.           Plan:    Best medical therapy.    Lipitor 80 mg.    Aspirin 81 mg.    Dietitian to see regarding diabetic diet    Comment:   Good prognosis    Past medical therapy    I discussed the patients findings and my recommendations with patient and family      Georgi Graham MD  12/03/18  1:45 PM

## 2018-12-03 NOTE — H&P
"    [facility] Medicine Services  HISTORY AND PHYSICAL    Patient Name: Shweta Gonsales  : 1965  MRN: [id number]  Primary Care Physician: Jazmine Craig MD  Date of admission: 2018      Subjective   Subjective     Chief Complaint:  Right sided heaviness    HPI:  Shweta Gonsales is a 53 y.o. female with a past medical history significant for hypertension, DM2, HLD, and rheumatoid arthritis who presents with complaints of right sided heaviness and weakness. Onset around 1500 this afternoon. Patient reports spontaneous inability to lift right upper and lower extremity. Also endorsing difficulty with ambulation and associated global headache with onset of symptoms. She had URI symptoms earlier this week including mild cough and congestion. States this is \"starting to clear up\". Was concerned and presented to ED. Patient currently denies fever, chest pain, palpitations, SOB, syncope, visual disturbance, abdominal pain, or N/V/D.    Patient reports having sustained a hemorrhagic stroke in Early 2018. States she was evaluated and treated for this at an institution near [place], [place]. She suspects the occurrence to be related to her BP as she was discharged home with HCTZ and a statin. Patient has apparently had residual right sided weakness since then, but reports acute severity in weakness today. Will admit for further evaluation and treatment.    Emergency Department Evaluation; vitals stable. Potassium 3.2. Alk phos 114. Chemistry and hematology otherwise favorable. UA negative. CXR negative. MRI brain demonstrates; Small acute infarct in left parietal periventricular white matter and chronic left thalamic infarct.    Review of Systems   Constitutional: Negative for chills and fever.   HENT: Negative for congestion and trouble swallowing.    Eyes: Negative for photophobia and visual disturbance.   Respiratory: Negative for cough and shortness of breath.    Cardiovascular: " Negative for chest pain and leg swelling.   Gastrointestinal: Negative for abdominal pain, diarrhea, nausea and vomiting.   Endocrine: Negative for cold intolerance and heat intolerance.   Genitourinary: Negative for dysuria and flank pain.   Musculoskeletal: Negative for back pain and myalgias.   Skin: Negative for pallor and rash.   Allergic/Immunologic: Negative for immunocompromised state.   Neurological: Positive for weakness and headaches. Negative for dizziness, light-headedness and numbness.   Hematological: Negative for adenopathy.   Psychiatric/Behavioral: Negative for agitation and confusion.          Otherwise 10-system ROS reviewed and is negative except as mentioned in the HPI.    Personal History     Past Medical History:   Diagnosis Date   • Diabetes mellitus (CMS/HCC)    • Hyperlipidemia    • Hypertension    • RA (rheumatoid arthritis) (CMS/HCC)    • Stroke (CMS/HCC)        Past Surgical History:   Procedure Laterality Date   • CHOLECYSTECTOMY     • FRACTURE SURGERY     • HYSTERECTOMY         Family History: family history is not on file. Otherwise pertinent FHx was reviewed and unremarkable.     Social History:  reports that  has never smoked. She does not have any smokeless tobacco history on file. She reports that she does not drink alcohol or use drugs.  Social History     Social History Narrative   • Not on file       Medications:  Available home medication information reviewed.    No Known Allergies    Objective   Objective     Vital Signs:   Temp:  [98.1 °F (36.7 °C)] 98.1 °F (36.7 °C)  Heart Rate:  [63-76] 63  Resp:  [18] 18  BP: (137-156)/(75-80) 137/80        Physical Exam   Constitutional: No acute distress, awake, alert  Eyes: PERRLA, sclerae anicteric, no conjunctival injection  HENT: NCAT, mucous membranes moist  Neck: Supple, no thyromegaly, no lymphadenopathy, trachea midline  Respiratory: Clear to auscultation bilaterally, nonlabored respirations   Cardiovascular: RRR, no murmurs,  rubs, or gallops, palpable pedal pulses bilaterally  Gastrointestinal: Positive bowel sounds, soft, nontender, nondistended  Musculoskeletal: No bilateral ankle edema, no clubbing or cyanosis to extremities  Psychiatric: Appropriate affect, cooperative  Neurologic: Oriented x 3, strength symmetric in all extremities, Cranial Nerves grossly intact to confrontation, speech clear  Skin: No rashes      Results Reviewed:  I have personally reviewed current lab, radiology, and data and agree.    Results from last 7 days   Lab Units  12/02/18   2132   WBC 10*3/mm3  8.98   HEMOGLOBIN g/dL  12.9   HEMATOCRIT %  37.5   PLATELETS 10*3/mm3  313   INR   1.04     Results from last 7 days   Lab Units  12/02/18   2132   SODIUM mmol/L  140   POTASSIUM mmol/L  3.2*   CHLORIDE mmol/L  105   CO2 mmol/L  34.0*   BUN mg/dL  18   CREATININE mg/dL  0.90   GLUCOSE mg/dL  108*   CALCIUM mg/dL  9.3   ALT (SGPT) U/L  19   AST (SGOT) U/L  16     Estimated Creatinine Clearance: 70.3 mL/min (by C-G formula based on SCr of 0.9 mg/dL).  Brief Urine Lab Results  (Last result in the past 365 days)      Color   Clarity   Blood   Leuk Est   Nitrite   Protein   CREAT   Urine HCG        12/02/18 2352 Yellow Clear Negative Trace Negative Negative             No results found for: BNP  Imaging Results (last 24 hours)     Procedure Component Value Units Date/Time    MRI Brain Without Contrast [[id number]] Collected:  12/02/18 2311     Updated:  12/03/18 0001    Addenda:        Note: Critical Findings were discussed with FELICITY Ly at 12/3/2018   11:58   AM EST. The report was understood and acknowledged by the healthcare   giver.    THIS DOCUMENT HAS BEEN ELECTRONICALLY SIGNED BY GAVIN MENSAH MD  Signed:  12/03/18 0000 by Gavin Mensah MD    Narrative:       EXAM:    MR Head Without Contrast     EXAM DATE/TIME:    12/2/2018 11:11 PM     CLINICAL HISTORY:    53 years old, female; Hypokalemia; Paresthesia of skin; Personal history of    transient ischemic attack (tia), and cerebral infarction without residual   deficits; Signs and symptoms; Weakness, extremity; Right; Patient HX: Right   sided heaviness, right sided deficits from stroke 10/5/18; Additional info: R   sided heaviness     TECHNIQUE:    MR of the head without contrast.     COMPARISON:    No relevant prior studies available.     FINDINGS:    Brain:  Small acute diffusion abnormality in left parietal periventricular   white matter, consistent with acute small infarct. Chronic mild microvascular   ischemic disease of bilateral cerebral white matter. Chronic-appearing   trianglar-shaped low signal at left thalamus likely to be old infarct and/or   calcification. Bilateral cerebellopontine angle structures are unremarkable. No   downward herniation, midline shift of brain, or acute mass effect.  No acute   intracranial hemorrhage visualized.   Ventricles:  No hydrocephalus. Unremarkable.    Bones/joints: No acute abnormality.  Minimal osteophytes at cervical spine.   Soft tissues:  Soft tissue swelling of nasal turbinates.    Sinuses:  Mild bilateral ethmoid and right maxillary sinusitis.   Mastoid air cells:  No mastoid effusion.    Orbits:  Orbital structures are unremarkable, as visualized.    Nasopharynx:  Deviation of the nasal septum.       Impression:       1. Small acute infarct in left parietal periventricular white matter.   2. Chronic mild microvascular ischemic disease of bilateral cerebral white   matter.   3. Suspect chronic left thalamic infarct.   4.  No acute intracranial hemorrhage visualized.  5.  Mild bilateral ethmoid and right maxillary sinusitis.    THIS DOCUMENT HAS BEEN ELECTRONICALLY SIGNED BY GAVIN MENSAH MD    XR Chest 1 View [[id number]] Collected:  12/02/18 2145     Updated:  12/02/18 6828    Narrative:       EXAM:    XR Chest, 1 View     EXAM DATE/TIME:    12/2/2018 9:45 PM     CLINICAL HISTORY:    53 years old, female; Hypokalemia; Paresthesia of skin;  Personal history of   transient ischemic attack (tia), and cerebral infarction without residual   deficits; Signs and symptoms; Cough     TECHNIQUE:    XR of the chest, 1 view.     COMPARISON:    No relevant prior studies available.     FINDINGS:    Lungs:  No acute infiltrates. No pneumonia or CHF.    Pleural space:  No pneumothorax. No pleural effusion.    Heart/Mediastinum:  Normal heart size. No acute mediastinal abnormality   visualized.    Bones/joints:  No acute abnormality seen along the well-visualized osseous   structures.    Soft tissues:  RUQ surgical clips.       Impression:       No acute cardiopulmonary disease demonstrated.     THIS DOCUMENT HAS BEEN ELECTRONICALLY SIGNED BY GAVIN MENSAH MD             Assessment/Plan   Assessment / Plan     Active Hospital Problems    Diagnosis   • **CVA (cerebral vascular accident) (CMS/HCC)   • Type 2 diabetes mellitus (CMS/[facility])   • Essential hypertension   • Hyperlipemia   • Rheumatoid arthritis (CMS/[facility])         Assessment & Plan:  1. Acute ischemic lacunar CVA:  - with recent hemorrhagic stroke less than 3 months ago  - administered ASA in ED. Continue daily for now and monitor closely  - hold home statin and add high intensity statin. Check lipid panel and A1c  - add echocardiogram, carotid doppler  - PT/OT/SLP treat and eval  - neurology to see in am  - BP control as needed  - am labs    2. DM2:  - non insulin dependent. Old oral hypoglycemics for now  - add SSI and accu checks    3. Hypertension:  - borderline, but stable and controlled.  - monitor. PRN meds as needed  - awaiting med reconciliation    4. RA:  - does not appear to currently be on immunosuppressive therapy     DVT prophylaxis: mechanical    CODE STATUS:  Full code, full support  Code Status and Medical Interventions:   Ordered at: 12/03/18 0021     Level Of Support Discussed With:    Patient     Code Status:    CPR     Medical Interventions (Level of Support Prior to Arrest):    Full        Admission Status:  I believe this patient meets OBSERVATION status, however if further evaluation or treatment plans warrant, status may change.  Based upon current information, I predict patient's care encounter to be less than or equal to 2 midnights.        Electronically signed by Sean Swartz PA-C, 12/03/18, 12:22 AM.      PHYSICIAN NOTE(ADDENDUM)       Vitals:    12/02/18 2200   BP: 137/80   Pulse: 63   Resp: 18    Temp: 98.1    SpO2: 96% room air        HPI.  53-year-old female presented to the chief complaint of right extremity heaviness-since 4 pm,  Weakness -none  Sensory symptoms-new numbness R.side,  Facial droop or speech deficit-none  Blurry vision/headache -frontal-no hx of migraine.    Co of dry cough, with congestion.    Aspirin 325    Exam  RS- CTA-BL, ,  No wheezing , no crackles, good effort.  CVS- s1s2 Regular, no murmur.  ABD- soft, non tender, not distended, no organomegaly.  EXT- no edema.  NEURO- AAO-3, no focal deficit,sensory deficit R.ext.      Old records reviewed and summarized in PM hx.      PM hx  CVA in October-hemorrhagic stroke 2nd to HTN .  DM2-Januvia 50 mg, glipizide 5 mg  HTN-Norvasc 5 mg, HCTZ 25 mg, losartan 50 mg by mouth daily  RA  Hyperlipidemia-Lipitor 40 mg      LABS  UA is neg.  EKG-pending.  Cx-neg    A/P  -neuro consult-permisive HTN-mri positive for small acute infarct L.parietal lobe.  -replace K-3-2  -dry cough-likely sinusitis  -UA neg.      I have independently seen and examined the patient with ARNP and the note above reflects my changes and contributions. I have discussed with findings, diagnosis and plans with the patient and family.     Nellie Xiong MD 12/03/18 12:38 AM

## 2018-12-03 NOTE — PLAN OF CARE
Problem: Stroke (Ischemic) (Adult)  Goal: Signs and Symptoms of Listed Potential Problems Will be Absent, Minimized or Managed (Stroke)  Outcome: Outcome(s) achieved Date Met: 12/03/18

## 2018-12-04 ENCOUNTER — APPOINTMENT (OUTPATIENT)
Dept: MRI IMAGING | Facility: HOSPITAL | Age: 53
End: 2018-12-04

## 2018-12-04 ENCOUNTER — APPOINTMENT (OUTPATIENT)
Dept: CT IMAGING | Facility: HOSPITAL | Age: 53
End: 2018-12-04

## 2018-12-04 ENCOUNTER — APPOINTMENT (OUTPATIENT)
Dept: GENERAL RADIOLOGY | Facility: HOSPITAL | Age: 53
End: 2018-12-04

## 2018-12-04 ENCOUNTER — HOSPITAL ENCOUNTER (INPATIENT)
Facility: HOSPITAL | Age: 53
LOS: 1 days | Discharge: HOME | End: 2018-12-06
Attending: EMERGENCY MEDICINE | Admitting: INTERNAL MEDICINE

## 2018-12-04 VITALS
RESPIRATION RATE: 18 BRPM | OXYGEN SATURATION: 99 % | BODY MASS INDEX: 30.8 KG/M2 | SYSTOLIC BLOOD PRESSURE: 136 MMHG | HEIGHT: 62 IN | HEART RATE: 57 BPM | DIASTOLIC BLOOD PRESSURE: 77 MMHG | TEMPERATURE: 98.5 F | WEIGHT: 167.4 LBS

## 2018-12-04 DIAGNOSIS — I61.9: ICD-10-CM

## 2018-12-04 DIAGNOSIS — Z74.09 IMPAIRED MOBILITY AND ADLS: ICD-10-CM

## 2018-12-04 DIAGNOSIS — Z74.09 IMPAIRED FUNCTIONAL MOBILITY, BALANCE, GAIT, AND ENDURANCE: ICD-10-CM

## 2018-12-04 DIAGNOSIS — R44.9 RIGHT-SIDED SENSORY DEFICIT PRESENT: Primary | ICD-10-CM

## 2018-12-04 DIAGNOSIS — Z78.9 IMPAIRED MOBILITY AND ADLS: ICD-10-CM

## 2018-12-04 LAB
ALT SERPL W P-5'-P-CCNC: 24 U/L (ref 7–40)
ANION GAP SERPL CALCULATED.3IONS-SCNC: 6 MMOL/L (ref 3–11)
ANION GAP SERPL CALCULATED.3IONS-SCNC: 6 MMOL/L (ref 3–11)
APTT PPP: 30.3 SECONDS (ref 24–31)
AST SERPL-CCNC: 24 U/L (ref 0–33)
BASOPHILS # BLD AUTO: 0.02 10*3/MM3 (ref 0–0.2)
BASOPHILS NFR BLD AUTO: 0.2 % (ref 0–1)
BUN BLD-MCNC: 13 MG/DL (ref 9–23)
BUN BLD-MCNC: 19 MG/DL (ref 9–23)
BUN/CREAT SERPL: 16 (ref 7–25)
BUN/CREAT SERPL: 20.7 (ref 7–25)
CALCIUM SPEC-SCNC: 10.2 MG/DL (ref 8.7–10.4)
CALCIUM SPEC-SCNC: 9.2 MG/DL (ref 8.7–10.4)
CHLORIDE SERPL-SCNC: 104 MMOL/L (ref 99–109)
CHLORIDE SERPL-SCNC: 106 MMOL/L (ref 99–109)
CO2 SERPL-SCNC: 27 MMOL/L (ref 20–31)
CO2 SERPL-SCNC: 28 MMOL/L (ref 20–31)
CREAT BLD-MCNC: 0.81 MG/DL (ref 0.6–1.3)
CREAT BLD-MCNC: 0.92 MG/DL (ref 0.6–1.3)
DEPRECATED RDW RBC AUTO: 42.4 FL (ref 37–54)
EOSINOPHIL # BLD AUTO: 0.13 10*3/MM3 (ref 0–0.3)
EOSINOPHIL NFR BLD AUTO: 1.3 % (ref 0–3)
ERYTHROCYTE [DISTWIDTH] IN BLOOD BY AUTOMATED COUNT: 13.3 % (ref 11.3–14.5)
GFR SERPL CREATININE-BSD FRML MDRD: 78 ML/MIN/1.73
GFR SERPL CREATININE-BSD FRML MDRD: 90 ML/MIN/1.73
GLUCOSE BLD-MCNC: 103 MG/DL (ref 70–100)
GLUCOSE BLD-MCNC: 112 MG/DL (ref 70–100)
GLUCOSE BLDC GLUCOMTR-MCNC: 105 MG/DL (ref 70–130)
GLUCOSE BLDC GLUCOMTR-MCNC: 106 MG/DL (ref 70–130)
HCT VFR BLD AUTO: 40.9 % (ref 34.5–44)
HGB BLD-MCNC: 14 G/DL (ref 11.5–15.5)
HOLD SPECIMEN: NORMAL
HOLD SPECIMEN: NORMAL
IMM GRANULOCYTES # BLD: 0.01 10*3/MM3 (ref 0–0.03)
IMM GRANULOCYTES NFR BLD: 0.1 % (ref 0–0.6)
LYMPHOCYTES # BLD AUTO: 3.13 10*3/MM3 (ref 0.6–4.8)
LYMPHOCYTES NFR BLD AUTO: 31.5 % (ref 24–44)
MCH RBC QN AUTO: 29.5 PG (ref 27–31)
MCHC RBC AUTO-ENTMCNC: 34.2 G/DL (ref 32–36)
MCV RBC AUTO: 86.3 FL (ref 80–99)
MONOCYTES # BLD AUTO: 0.49 10*3/MM3 (ref 0–1)
MONOCYTES NFR BLD AUTO: 4.9 % (ref 0–12)
NEUTROPHILS # BLD AUTO: 6.18 10*3/MM3 (ref 1.5–8.3)
NEUTROPHILS NFR BLD AUTO: 62.1 % (ref 41–71)
PLATELET # BLD AUTO: 380 10*3/MM3 (ref 150–450)
PMV BLD AUTO: 10.6 FL (ref 6–12)
POTASSIUM BLD-SCNC: 3.6 MMOL/L (ref 3.5–5.5)
POTASSIUM BLD-SCNC: 3.6 MMOL/L (ref 3.5–5.5)
RBC # BLD AUTO: 4.74 10*6/MM3 (ref 3.89–5.14)
SODIUM BLD-SCNC: 138 MMOL/L (ref 132–146)
SODIUM BLD-SCNC: 139 MMOL/L (ref 132–146)
TROPONIN I SERPL-MCNC: 0 NG/ML (ref 0–0.07)
WBC NRBC COR # BLD: 9.95 10*3/MM3 (ref 3.5–10.8)
WHOLE BLOOD HOLD SPECIMEN: NORMAL
WHOLE BLOOD HOLD SPECIMEN: NORMAL

## 2018-12-04 PROCEDURE — 84484 ASSAY OF TROPONIN QUANT: CPT

## 2018-12-04 PROCEDURE — 99285 EMERGENCY DEPT VISIT HI MDM: CPT

## 2018-12-04 PROCEDURE — 70450 CT HEAD/BRAIN W/O DYE: CPT

## 2018-12-04 PROCEDURE — 85025 COMPLETE CBC W/AUTO DIFF WBC: CPT | Performed by: EMERGENCY MEDICINE

## 2018-12-04 PROCEDURE — 80048 BASIC METABOLIC PNL TOTAL CA: CPT | Performed by: EMERGENCY MEDICINE

## 2018-12-04 PROCEDURE — 82962 GLUCOSE BLOOD TEST: CPT

## 2018-12-04 PROCEDURE — 70553 MRI BRAIN STEM W/O & W/DYE: CPT

## 2018-12-04 PROCEDURE — 84450 TRANSFERASE (AST) (SGOT): CPT | Performed by: EMERGENCY MEDICINE

## 2018-12-04 PROCEDURE — 80048 BASIC METABOLIC PNL TOTAL CA: CPT | Performed by: INTERNAL MEDICINE

## 2018-12-04 PROCEDURE — 0042T HC CT CEREBRAL PERFUSION W/WO CONTRAST: CPT

## 2018-12-04 PROCEDURE — 99239 HOSP IP/OBS DSCHRG MGMT >30: CPT | Performed by: INTERNAL MEDICINE

## 2018-12-04 PROCEDURE — 70548 MR ANGIOGRAPHY NECK W/DYE: CPT

## 2018-12-04 PROCEDURE — 36415 COLL VENOUS BLD VENIPUNCTURE: CPT

## 2018-12-04 PROCEDURE — 85730 THROMBOPLASTIN TIME PARTIAL: CPT | Performed by: EMERGENCY MEDICINE

## 2018-12-04 PROCEDURE — 84460 ALANINE AMINO (ALT) (SGPT): CPT | Performed by: EMERGENCY MEDICINE

## 2018-12-04 PROCEDURE — 0 IOPAMIDOL PER 1 ML: Performed by: EMERGENCY MEDICINE

## 2018-12-04 PROCEDURE — 99231 SBSQ HOSP IP/OBS SF/LOW 25: CPT | Performed by: PSYCHIATRY & NEUROLOGY

## 2018-12-04 PROCEDURE — 70544 MR ANGIOGRAPHY HEAD W/O DYE: CPT

## 2018-12-04 PROCEDURE — 93005 ELECTROCARDIOGRAM TRACING: CPT | Performed by: EMERGENCY MEDICINE

## 2018-12-04 PROCEDURE — 71045 X-RAY EXAM CHEST 1 VIEW: CPT

## 2018-12-04 RX ORDER — SODIUM CHLORIDE 0.9 % (FLUSH) 0.9 %
10 SYRINGE (ML) INJECTION AS NEEDED
Status: DISCONTINUED | OUTPATIENT
Start: 2018-12-04 | End: 2018-12-06 | Stop reason: HOSPADM

## 2018-12-04 RX ORDER — ATORVASTATIN CALCIUM 80 MG/1
80 TABLET, FILM COATED ORAL NIGHTLY
Qty: 30 TABLET | Refills: 1 | Status: SHIPPED | OUTPATIENT
Start: 2018-12-04

## 2018-12-04 RX ORDER — POTASSIUM CHLORIDE 750 MG/1
10 CAPSULE, EXTENDED RELEASE ORAL DAILY
Qty: 30 CAPSULE | Refills: 0 | Status: SHIPPED | OUTPATIENT
Start: 2018-12-05

## 2018-12-04 RX ADMIN — ASPIRIN 325 MG ORAL TABLET 325 MG: 325 PILL ORAL at 09:32

## 2018-12-04 RX ADMIN — HYDROCHLOROTHIAZIDE 25 MG: 25 TABLET ORAL at 09:31

## 2018-12-04 RX ADMIN — AMLODIPINE BESYLATE 5 MG: 5 TABLET ORAL at 09:31

## 2018-12-04 RX ADMIN — IOPAMIDOL 40 ML: 755 INJECTION, SOLUTION INTRAVENOUS at 21:30

## 2018-12-04 RX ADMIN — POTASSIUM CHLORIDE 40 MEQ: 750 CAPSULE, EXTENDED RELEASE ORAL at 05:46

## 2018-12-04 RX ADMIN — POTASSIUM CHLORIDE 10 MEQ: 750 CAPSULE, EXTENDED RELEASE ORAL at 09:32

## 2018-12-04 NOTE — PLAN OF CARE
"Problem: Patient Care Overview  Goal: Plan of Care Review  Outcome: Ongoing (interventions implemented as appropriate)   12/03/18 1800   Coping/Psychosocial   Plan of Care Reviewed With patient;spouse   Plan of Care Review   Progress improving   OTHER   Outcome Summary Pt states her deficits have felt better. NIHSS 1 (right side feels \"heavier\"). Pt states that she feels a lot better today. TTE showed no PFO. FSBS remained below 150.        Problem: Fall Risk (Adult)  Goal: Absence of Fall  Outcome: Ongoing (interventions implemented as appropriate)   12/03/18 1930   Fall Risk (Adult)   Absence of Fall making progress toward outcome         "

## 2018-12-04 NOTE — PROGRESS NOTES
Case Management Discharge Note    Final Note: I spoke with  by phone and notified him of appt for outpatient PT at [facility] ( [facility]) on 12/10 at 0930, he verbalized understanding. No other d/c needs at this time.    Destination      No service has been selected for the patient.      Durable Medical Equipment      No service has been selected for the patient.      Dialysis/Infusion      No service has been selected for the patient.      Home Medical Care      No service has been selected for the patient.      Community Resources      No service has been selected for the patient.             Final Discharge Disposition Code: 01 - home or self-care

## 2018-12-04 NOTE — CONSULTS
"                  Clinical Nutrition     Reason for Visit:   Need for education      Patient Name: Shweta Gonsales  YOB: 1965  MRN: [id number]  Date of Encounter: 12/04/18 3:18 PM  Admission date: 12/2/2018      Nutrition Assessment   Assessment     Admission Problem List:  Acute ischemic CVA  T2DM    Applicable Nutrition-Related Information:  HgbA1c = 6.6%    Applicable PMH/procedures:  HTN  HLP  Rheumatoid arthritis  Stroke    CCY    Reported/Observed/Food/Nutrition Related History:     Patient asking appropriate questions. Written materials for T2DM from the [employer] provided. Patient asked appropriate questions related to foods containing carbohydrates, carbohydrate counting, etc.      Anthropometrics     Height: 157.5 cm (62\")  Last filed wt: Weight: 75.9 kg (167 lb 6.4 oz) (12/03/18 0133)  Weight Method: Bed scale    BMI: BMI (Calculated): 31.1  Obese Class I: 30-34.9kg/m2    Ideal Body Weight (IBW) (kg): 50.43    Labs reviewed     Results from last 7 days   Lab Units  12/04/18   0610  12/03/18   0616  12/02/18   2132   GLUCOSE mg/dL  103*  94  108*   BUN mg/dL  13  14  18   CREATININE mg/dL  0.81  0.83  0.90   SODIUM mmol/L  139  139  140   CHLORIDE mmol/L  106  102  105   POTASSIUM mmol/L  3.6  3.3*  3.2*   ALT (SGPT) U/L   --   18  19       Results from last 7 days   Lab Units  12/04/18   1213  12/04/18   0733  12/03/18   2022  12/03/18   1634  12/03/18   1152  12/03/18   0753   GLUCOSE mg/dL  106  105  132*  145*  119  102     Lab Results   Lab Value Date/Time    HGBA1C 6.60 (H) 12/03/2018 0616       Medications reviewed   Pertinent: Yes    Current Nutrition Prescription     PO: Diet Regular; Consistent Carbohydrate    Nutrition Diagnosis     (12/3)  Problem Food and nutrition knowledge deficit   Etiology T2DM   Signs/Symptoms No previous nutrition education       Nutrition Intervention   1.  Follow treatment progress, Care plan reviewed  2.  Education provided per MD " consult for type 2 diabetes nutrition education. Patient asked appropriate questions and expressed interest in outpatient nutrition services. Advised patient about [facility] outpatient services.      Goal:   General: Nutrition support treatment    Monitoring/Evaluation:   Per protocol, Pertinent labs    Will Continue to follow per protocol    Siri Lares  Time Spent: 30 minutes

## 2018-12-04 NOTE — DISCHARGE SUMMARY
[facility] Medicine Services  DISCHARGE SUMMARY    Patient Name: Shweta Gonsales  : 1965  MRN: [id number]    Date of Admission: 2018  Date of Discharge:  18  Primary Care Physician: Jazmine Craig MD   Consultation: Neurology     Consults     Date and Time Order Name Status Description    12/3/2018 0427 Inpatient Neurology Consult Stroke Completed         Hospital Course     Presenting Problem:   CVA (cerebral vascular accident) (CMS/[facility]) [I63.9]    Active Hospital Problems    Diagnosis Date Noted   • **CVA (cerebral vascular accident) (CMS/[facility]) [I63.9] 2018   • Type 2 diabetes mellitus (CMS/[facility]) [E11.9] 2018   • Hyperlipemia [E78.5] 2018   • Essential hypertension [I10] 2018   • Rheumatoid arthritis (CMS/[facility]) [M06.9] 2018      Resolved Hospital Problems   No resolved problems to display.          Hospital Course:  -- Acute ischemic lacunar CVA:  - with recent hemorrhagic stroke less than 3 months ago  - administered ASA in ED  - Increased home statin to atorvastatin 80 mg daily   - ECHO with [facility], negative for PFO; carotid without significant stenosis   - PT/OT - recommended home; can do outpatient PT   - Neurology eval; continue ASA/statin      -- DM2:  - non insulin dependent. Old oral hypoglycemics for now  - A1C 6.6%  - Resume home meds on discharge      --  Hypertension:  - Resumed home medications. Started potassium 10 mEq daily as low K on admission and on HCTZ  - Recommend repeating BMP in 1 week with PCP; recommendations discussed with PT      -- RA:  - does not appear to currently be on immunosuppressive therapy      Discharge Follow Up Recommendations for labs/diagnostics:  PCP 1 week   Neurology 1 month     Day of Discharge     HPI:   States she is feeling good today. Right arm heaviness is resolved. Still feels some sensation change. Worked with PT and would like to continue with outpatient PT that referral was started by  her PCP.     Review of Systems  Gen- No fevers, chills  CV- No chest pain, palpitations  Resp- No cough, dyspnea  GI- No N/V/D, abd pain    Otherwise ROS is negative except as mentioned in the HPI.    Vital Signs:   Temp:  [97.7 °F (36.5 °C)-98.5 °F (36.9 °C)] 98.5 °F (36.9 °C)  Heart Rate:  [57-74] 57  Resp:  [14-18] 18  BP: (124-153)/(70-87) 136/77     Physical Exam:  Constitutional: No acute distress, awake, alert  HENT: NCAT, mucous membranes moist  Respiratory: Clear to auscultation bilaterally, respiratory effort normal   Cardiovascular: RRR, no murmurs, rubs, or gallops, palpable pedal pulses bilaterally  Gastrointestinal: Positive bowel sounds, soft, nontender, nondistended  Musculoskeletal: No bilateral ankle edema  Psychiatric: Appropriate affect, cooperative  Neurologic: Oriented x 3, strength symmetric in all extremities, Cranial Nerves grossly intact to confrontation, speech clear  Skin: No rashes      Pertinent  and/or Most Recent Results     Results from last 7 days   Lab Units  12/04/18   0610  12/03/18   0616  12/02/18   2132   WBC 10*3/mm3   --    --   8.98   HEMOGLOBIN g/dL   --    --   12.9   HEMATOCRIT %   --    --   37.5   PLATELETS 10*3/mm3   --    --   313   SODIUM mmol/L  139  139  140   POTASSIUM mmol/L  3.6  3.3*  3.2*   CHLORIDE mmol/L  106  102  105   CO2 mmol/L  27.0  30.0  34.0*   BUN mg/dL  13  14  18   CREATININE mg/dL  0.81  0.83  0.90   GLUCOSE mg/dL  103*  94  108*   CALCIUM mg/dL  9.2  9.1  9.3     Results from last 7 days   Lab Units  12/03/18   0616  12/02/18   2132   BILIRUBIN mg/dL  0.5  0.4   ALK PHOS U/L  103*  114*   ALT (SGPT) U/L  18  19   AST (SGOT) U/L  17  16   PROTIME Seconds   --   10.9   INR    --   1.04     Results from last 7 days   Lab Units  12/03/18   0616   CHOLESTEROL mg/dL  93   TRIGLYCERIDES mg/dL  53   HDL CHOL mg/dL  30*     Results from last 7 days   Lab Units  12/03/18   0616   HEMOGLOBIN A1C %  6.60*     Brief Urine Lab Results  (Last result in the  past 365 days)      Color   Clarity   Blood   Leuk Est   Nitrite   Protein   CREAT   Urine HCG        12/02/18 2352 Yellow Clear Negative Trace Negative Negative               Microbiology Results Abnormal     None          Imaging Results (all)     Procedure Component Value Units Date/Time    MRI Brain Without Contrast [[id number]] Collected:  12/02/18 2311     Updated:  12/03/18 0001    Addenda:        Note: Critical Findings were discussed with FELICITY Ly at 12/3/2018   11:58   AM EST. The report was understood and acknowledged by the healthcare   giver.    THIS DOCUMENT HAS BEEN ELECTRONICALLY SIGNED BY GAVIN MENSAH MD  Signed:  12/03/18 0000 by Gavin Mensah MD    Narrative:       EXAM:    MR Head Without Contrast     EXAM DATE/TIME:    12/2/2018 11:11 PM     CLINICAL HISTORY:    53 years old, female; Hypokalemia; Paresthesia of skin; Personal history of   transient ischemic attack (tia), and cerebral infarction without residual   deficits; Signs and symptoms; Weakness, extremity; Right; Patient HX: Right   sided heaviness, right sided deficits from stroke 10/5/18; Additional info: R   sided heaviness     TECHNIQUE:    MR of the head without contrast.     COMPARISON:    No relevant prior studies available.     FINDINGS:    Brain:  Small acute diffusion abnormality in left parietal periventricular   white matter, consistent with acute small infarct. Chronic mild microvascular   ischemic disease of bilateral cerebral white matter. Chronic-appearing   trianglar-shaped low signal at left thalamus likely to be old infarct and/or   calcification. Bilateral cerebellopontine angle structures are unremarkable. No   downward herniation, midline shift of brain, or acute mass effect.  No acute   intracranial hemorrhage visualized.   Ventricles:  No hydrocephalus. Unremarkable.    Bones/joints: No acute abnormality.  Minimal osteophytes at cervical spine.   Soft tissues:  Soft tissue swelling of nasal  turbinates.    Sinuses:  Mild bilateral ethmoid and right maxillary sinusitis.   Mastoid air cells:  No mastoid effusion.    Orbits:  Orbital structures are unremarkable, as visualized.    Nasopharynx:  Deviation of the nasal septum.       Impression:       1. Small acute infarct in left parietal periventricular white matter.   2. Chronic mild microvascular ischemic disease of bilateral cerebral white   matter.   3. Suspect chronic left thalamic infarct.   4.  No acute intracranial hemorrhage visualized.  5.  Mild bilateral ethmoid and right maxillary sinusitis.    THIS DOCUMENT HAS BEEN ELECTRONICALLY SIGNED BY GAVIN MENSAH MD    XR Chest 1 View [[id number]] Collected:  12/02/18 2145     Updated:  12/02/18 231    Narrative:       EXAM:    XR Chest, 1 View     EXAM DATE/TIME:    12/2/2018 9:45 PM     CLINICAL HISTORY:    53 years old, female; Hypokalemia; Paresthesia of skin; Personal history of   transient ischemic attack (tia), and cerebral infarction without residual   deficits; Signs and symptoms; Cough     TECHNIQUE:    XR of the chest, 1 view.     COMPARISON:    No relevant prior studies available.     FINDINGS:    Lungs:  No acute infiltrates. No pneumonia or CHF.    Pleural space:  No pneumothorax. No pleural effusion.    Heart/Mediastinum:  Normal heart size. No acute mediastinal abnormality   visualized.    Bones/joints:  No acute abnormality seen along the well-visualized osseous   structures.    Soft tissues:  RUQ surgical clips.       Impression:       No acute cardiopulmonary disease demonstrated.     THIS DOCUMENT HAS BEEN ELECTRONICALLY SIGNED BY GAVIN MENSAH MD          Results for orders placed during the hospital encounter of 12/02/18   Duplex Carotid Ultrasound CAR    Narrative · Proximal right internal carotid artery plaque without significant   stenosis.  · Proximal left internal carotid artery plaque without significant   stenosis.  · Vetebral flow is antegrade bilaterally           Results for orders placed during the hospital encounter of 12/02/18   Duplex Carotid Ultrasound CAR    Narrative · Proximal right internal carotid artery plaque without significant   stenosis.  · Proximal left internal carotid artery plaque without significant   stenosis.  · Vetebral flow is antegrade bilaterally          Results for orders placed during the hospital encounter of 12/02/18   Adult Transthoracic Echo Complete W/ Cont if Necessary Per Protocol (With Agitated Saline)    Narrative · Left ventricular systolic function is normal.  · Estimated EF appears to be in the range of 61 - 65%.  · Left ventricular wall thickness is consistent with borderline concentric   hypertrophy. Left ventricular diastolic function is normal.  · No evidence of a patent foramen ovale. Saline test results are negative.  · Calculated right ventricular systolic pressure from tricuspid   regurgitation is 30 mmHg.            Discharge Details        Discharge Medications      New Medications      Instructions Start Date   aspirin 81 MG tablet   81 mg, Oral, Daily      potassium chloride 10 MEQ CR capsule  Commonly known as:  MICRO-K   10 mEq, Oral, Daily         Changes to Medications      Instructions Start Date   atorvastatin 80 MG tablet  Commonly known as:  LIPITOR  What changed:    · medication strength  · how much to take   80 mg, Oral, Nightly         Continue These Medications      Instructions Start Date   amLODIPine 5 MG tablet  Commonly known as:  NORVASC   5 mg, Oral, Daily      glipiZIDE 5 MG tablet  Commonly known as:  GLUCOTROL   5 mg, Oral, Daily      hydrochlorothiazide 25 MG tablet  Commonly known as:  HYDRODIURIL   25 mg, Oral, Daily      JANUVIA 50 MG tablet  Generic drug:  SITagliptin   50 mg, Oral, Daily      losartan 50 MG tablet  Commonly known as:  COZAAR   50 mg, Oral, Daily               Discharge Disposition:  Home or Self Care    Discharge Diet:  Diet Instructions     Diet: Consistent Carbohydrate       Discharge Diet:  Consistent Carbohydrate          Discharge Activity:   Activity Instructions     Activity as Tolerated                    Code Status/Level of Support:  Code Status and Medical Interventions:   Ordered at: 12/03/18 0021     Level Of Support Discussed With:    Patient     Code Status:    CPR     Medical Interventions (Level of Support Prior to Arrest):    Full       Future Appointments   Date Time Provider Department Center   12/10/2018  9:30 AM Margoth Danielle, PT [facility] None       Additional Instructions for the Follow-ups that You Need to Schedule     Discharge Follow-up with PCP   As directed       Currently Documented PCP:    Jazmine Craig MD    PCP Phone Number:    [phone]     Follow Up Details:  PCP 1 week         Discharge Follow-up with Specified Provider: Neurology 1 month   As directed      To:  Neurology 1 month               Time Spent on Discharge:  35 minutes    Electronically signed by Rupal Alford DO 12/04/18, 12:16 PM.

## 2018-12-04 NOTE — PLAN OF CARE
Problem: Patient Care Overview  Goal: Plan of Care Review   12/04/18 0535   Coping/Psychosocial   Plan of Care Reviewed With patient;spouse   Plan of Care Review   Progress improving   OTHER   Outcome Summary patient had a very good night. discussed medications and how much better patient felt. understanding of symptoms and what to do when/if happens again. spouse at bedside. continues sinus bradycardia when sleeping (low to mid 50's)       Problem: Stroke (Ischemic) (Adult)  Goal: Signs and Symptoms of Listed Potential Problems Will be Absent, Minimized or Managed (Stroke)  Outcome: Ongoing (interventions implemented as appropriate)      Problem: Fall Risk (Adult)  Goal: Identify Related Risk Factors and Signs and Symptoms  Outcome: Ongoing (interventions implemented as appropriate)    Goal: Absence of Fall  Outcome: Ongoing (interventions implemented as appropriate)

## 2018-12-05 ENCOUNTER — READMISSION MANAGEMENT (OUTPATIENT)
Dept: CALL CENTER | Facility: HOSPITAL | Age: 53
End: 2018-12-05

## 2018-12-05 PROBLEM — S06.33AA INTRAPARENCHYMAL HEMATOMA OF BRAIN (HCC): Status: ACTIVE | Noted: 2018-12-05

## 2018-12-05 PROBLEM — R44.9 RIGHT-SIDED SENSORY DEFICIT PRESENT: Status: ACTIVE | Noted: 2018-12-05

## 2018-12-05 PROBLEM — Z86.73 HISTORY OF CVA (CEREBROVASCULAR ACCIDENT): Status: ACTIVE | Noted: 2018-12-05

## 2018-12-05 PROBLEM — Z86.73 HISTORY OF HEMORRHAGIC CEREBROVASCULAR ACCIDENT (CVA) WITHOUT RESIDUAL DEFICITS: Status: ACTIVE | Noted: 2018-12-05

## 2018-12-05 PROBLEM — I69.30 HISTORY OF HEMORRHAGIC CEREBROVASCULAR ACCIDENT (CVA) WITH RESIDUAL DEFICIT: Status: ACTIVE | Noted: 2018-12-05

## 2018-12-05 LAB
ANION GAP SERPL CALCULATED.3IONS-SCNC: 7 MMOL/L (ref 3–11)
BUN BLD-MCNC: 13 MG/DL (ref 9–23)
BUN/CREAT SERPL: 16.7 (ref 7–25)
CALCIUM SPEC-SCNC: 9.2 MG/DL (ref 8.7–10.4)
CHLORIDE SERPL-SCNC: 104 MMOL/L (ref 99–109)
CO2 SERPL-SCNC: 28 MMOL/L (ref 20–31)
CREAT BLD-MCNC: 0.78 MG/DL (ref 0.6–1.3)
DEPRECATED RDW RBC AUTO: 42 FL (ref 37–54)
ERYTHROCYTE [DISTWIDTH] IN BLOOD BY AUTOMATED COUNT: 13.3 % (ref 11.3–14.5)
GFR SERPL CREATININE-BSD FRML MDRD: 94 ML/MIN/1.73
GLUCOSE BLD-MCNC: 107 MG/DL (ref 70–100)
GLUCOSE BLDC GLUCOMTR-MCNC: 125 MG/DL (ref 70–130)
GLUCOSE BLDC GLUCOMTR-MCNC: 137 MG/DL (ref 70–130)
GLUCOSE BLDC GLUCOMTR-MCNC: 173 MG/DL (ref 70–130)
GLUCOSE BLDC GLUCOMTR-MCNC: 98 MG/DL (ref 70–130)
HCT VFR BLD AUTO: 38.1 % (ref 34.5–44)
HGB BLD-MCNC: 12.8 G/DL (ref 11.5–15.5)
MAGNESIUM SERPL-MCNC: 1.8 MG/DL (ref 1.3–2.7)
MCH RBC QN AUTO: 29 PG (ref 27–31)
MCHC RBC AUTO-ENTMCNC: 33.6 G/DL (ref 32–36)
MCV RBC AUTO: 86.4 FL (ref 80–99)
PHOSPHATE SERPL-MCNC: 3.8 MG/DL (ref 2.4–5.1)
PLATELET # BLD AUTO: 333 10*3/MM3 (ref 150–450)
PMV BLD AUTO: 10.4 FL (ref 6–12)
POTASSIUM BLD-SCNC: 3.2 MMOL/L (ref 3.5–5.5)
POTASSIUM BLD-SCNC: 3.6 MMOL/L (ref 3.5–5.5)
RBC # BLD AUTO: 4.41 10*6/MM3 (ref 3.89–5.14)
SODIUM BLD-SCNC: 139 MMOL/L (ref 132–146)
WBC NRBC COR # BLD: 9.86 10*3/MM3 (ref 3.5–10.8)

## 2018-12-05 PROCEDURE — 80048 BASIC METABOLIC PNL TOTAL CA: CPT | Performed by: NURSE PRACTITIONER

## 2018-12-05 PROCEDURE — A9577 INJ MULTIHANCE: HCPCS | Performed by: EMERGENCY MEDICINE

## 2018-12-05 PROCEDURE — 93010 ELECTROCARDIOGRAM REPORT: CPT | Performed by: INTERNAL MEDICINE

## 2018-12-05 PROCEDURE — 84132 ASSAY OF SERUM POTASSIUM: CPT | Performed by: INTERNAL MEDICINE

## 2018-12-05 PROCEDURE — 99253 IP/OBS CNSLTJ NEW/EST LOW 45: CPT | Performed by: PHYSICIAN ASSISTANT

## 2018-12-05 PROCEDURE — 97161 PT EVAL LOW COMPLEX 20 MIN: CPT

## 2018-12-05 PROCEDURE — 92523 SPEECH SOUND LANG COMPREHEN: CPT

## 2018-12-05 PROCEDURE — 83735 ASSAY OF MAGNESIUM: CPT | Performed by: NURSE PRACTITIONER

## 2018-12-05 PROCEDURE — 63710000001 INSULIN LISPRO (HUMAN) PER 5 UNITS: Performed by: INTERNAL MEDICINE

## 2018-12-05 PROCEDURE — 84100 ASSAY OF PHOSPHORUS: CPT | Performed by: NURSE PRACTITIONER

## 2018-12-05 PROCEDURE — 93005 ELECTROCARDIOGRAM TRACING: CPT | Performed by: INTERNAL MEDICINE

## 2018-12-05 PROCEDURE — 97530 THERAPEUTIC ACTIVITIES: CPT

## 2018-12-05 PROCEDURE — 0 GADOBENATE DIMEGLUMINE 529 MG/ML SOLUTION: Performed by: EMERGENCY MEDICINE

## 2018-12-05 PROCEDURE — 99291 CRITICAL CARE FIRST HOUR: CPT | Performed by: INTERNAL MEDICINE

## 2018-12-05 PROCEDURE — 97165 OT EVAL LOW COMPLEX 30 MIN: CPT

## 2018-12-05 PROCEDURE — 85027 COMPLETE CBC AUTOMATED: CPT | Performed by: NURSE PRACTITIONER

## 2018-12-05 PROCEDURE — 82962 GLUCOSE BLOOD TEST: CPT

## 2018-12-05 RX ORDER — MAGNESIUM SULFATE HEPTAHYDRATE 40 MG/ML
2 INJECTION, SOLUTION INTRAVENOUS AS NEEDED
Status: DISCONTINUED | OUTPATIENT
Start: 2018-12-05 | End: 2018-12-06 | Stop reason: HOSPADM

## 2018-12-05 RX ORDER — SODIUM CHLORIDE 0.9 % (FLUSH) 0.9 %
3 SYRINGE (ML) INJECTION EVERY 12 HOURS SCHEDULED
Status: DISCONTINUED | OUTPATIENT
Start: 2018-12-05 | End: 2018-12-06 | Stop reason: HOSPADM

## 2018-12-05 RX ORDER — POTASSIUM CHLORIDE 750 MG/1
40 CAPSULE, EXTENDED RELEASE ORAL AS NEEDED
Status: DISCONTINUED | OUTPATIENT
Start: 2018-12-05 | End: 2018-12-06 | Stop reason: HOSPADM

## 2018-12-05 RX ORDER — ATORVASTATIN CALCIUM 40 MG/1
80 TABLET, FILM COATED ORAL NIGHTLY
Status: DISCONTINUED | OUTPATIENT
Start: 2018-12-05 | End: 2018-12-06 | Stop reason: HOSPADM

## 2018-12-05 RX ORDER — MAGNESIUM SULFATE HEPTAHYDRATE 40 MG/ML
4 INJECTION, SOLUTION INTRAVENOUS AS NEEDED
Status: DISCONTINUED | OUTPATIENT
Start: 2018-12-05 | End: 2018-12-06 | Stop reason: HOSPADM

## 2018-12-05 RX ORDER — ONDANSETRON 2 MG/ML
4 INJECTION INTRAMUSCULAR; INTRAVENOUS EVERY 6 HOURS PRN
Status: DISCONTINUED | OUTPATIENT
Start: 2018-12-05 | End: 2018-12-06 | Stop reason: HOSPADM

## 2018-12-05 RX ORDER — HYDROCHLOROTHIAZIDE 25 MG/1
25 TABLET ORAL DAILY
Status: DISCONTINUED | OUTPATIENT
Start: 2018-12-05 | End: 2018-12-06 | Stop reason: HOSPADM

## 2018-12-05 RX ORDER — ACETAMINOPHEN 650 MG/1
650 SUPPOSITORY RECTAL EVERY 4 HOURS PRN
Status: DISCONTINUED | OUTPATIENT
Start: 2018-12-05 | End: 2018-12-06 | Stop reason: HOSPADM

## 2018-12-05 RX ORDER — LOSARTAN POTASSIUM 50 MG/1
50 TABLET ORAL DAILY
Status: DISCONTINUED | OUTPATIENT
Start: 2018-12-05 | End: 2018-12-06 | Stop reason: HOSPADM

## 2018-12-05 RX ORDER — POTASSIUM CHLORIDE 1.5 G/1.77G
40 POWDER, FOR SOLUTION ORAL AS NEEDED
Status: DISCONTINUED | OUTPATIENT
Start: 2018-12-05 | End: 2018-12-06 | Stop reason: HOSPADM

## 2018-12-05 RX ORDER — AMLODIPINE BESYLATE 5 MG/1
5 TABLET ORAL DAILY
Status: DISCONTINUED | OUTPATIENT
Start: 2018-12-05 | End: 2018-12-06 | Stop reason: HOSPADM

## 2018-12-05 RX ORDER — DEXTROSE MONOHYDRATE 25 G/50ML
25 INJECTION, SOLUTION INTRAVENOUS
Status: DISCONTINUED | OUTPATIENT
Start: 2018-12-05 | End: 2018-12-06 | Stop reason: HOSPADM

## 2018-12-05 RX ORDER — POTASSIUM CHLORIDE 7.45 MG/ML
10 INJECTION INTRAVENOUS
Status: DISCONTINUED | OUTPATIENT
Start: 2018-12-05 | End: 2018-12-06 | Stop reason: HOSPADM

## 2018-12-05 RX ORDER — ACETAMINOPHEN 325 MG/1
650 TABLET ORAL EVERY 4 HOURS PRN
Status: DISCONTINUED | OUTPATIENT
Start: 2018-12-05 | End: 2018-12-06 | Stop reason: HOSPADM

## 2018-12-05 RX ORDER — SODIUM CHLORIDE 0.9 % (FLUSH) 0.9 %
3-10 SYRINGE (ML) INJECTION AS NEEDED
Status: DISCONTINUED | OUTPATIENT
Start: 2018-12-05 | End: 2018-12-06 | Stop reason: HOSPADM

## 2018-12-05 RX ORDER — NICOTINE POLACRILEX 4 MG
15 LOZENGE BUCCAL
Status: DISCONTINUED | OUTPATIENT
Start: 2018-12-05 | End: 2018-12-06 | Stop reason: HOSPADM

## 2018-12-05 RX ORDER — POTASSIUM CHLORIDE 750 MG/1
10 CAPSULE, EXTENDED RELEASE ORAL DAILY
Status: DISCONTINUED | OUTPATIENT
Start: 2018-12-05 | End: 2018-12-06 | Stop reason: HOSPADM

## 2018-12-05 RX ADMIN — NICARDIPINE HYDROCHLORIDE 5 MG/HR: 0.1 INJECTION, SOLUTION INTRAVENOUS at 10:54

## 2018-12-05 RX ADMIN — ACETAMINOPHEN 650 MG: 325 TABLET ORAL at 04:27

## 2018-12-05 RX ADMIN — AMLODIPINE BESYLATE 5 MG: 5 TABLET ORAL at 08:07

## 2018-12-05 RX ADMIN — HYDROCHLOROTHIAZIDE 25 MG: 25 TABLET ORAL at 08:07

## 2018-12-05 RX ADMIN — NICARDIPINE HYDROCHLORIDE 5 MG/HR: 0.1 INJECTION, SOLUTION INTRAVENOUS at 06:35

## 2018-12-05 RX ADMIN — MAGNESIUM SULFATE HEPTAHYDRATE 4 G: 40 INJECTION, SOLUTION INTRAVENOUS at 08:05

## 2018-12-05 RX ADMIN — GADOBENATE DIMEGLUMINE 13 ML: 529 INJECTION, SOLUTION INTRAVENOUS at 00:15

## 2018-12-05 RX ADMIN — ACETAMINOPHEN 650 MG: 325 TABLET ORAL at 23:32

## 2018-12-05 RX ADMIN — LOSARTAN POTASSIUM 50 MG: 50 TABLET ORAL at 08:07

## 2018-12-05 RX ADMIN — INSULIN LISPRO 2 UNITS: 100 INJECTION, SOLUTION INTRAVENOUS; SUBCUTANEOUS at 21:30

## 2018-12-05 RX ADMIN — POTASSIUM CHLORIDE 40 MEQ: 750 CAPSULE, EXTENDED RELEASE ORAL at 21:27

## 2018-12-05 RX ADMIN — POTASSIUM CHLORIDE 40 MEQ: 750 CAPSULE, EXTENDED RELEASE ORAL at 12:30

## 2018-12-05 RX ADMIN — ATORVASTATIN CALCIUM 80 MG: 40 TABLET, FILM COATED ORAL at 21:25

## 2018-12-05 RX ADMIN — POTASSIUM CHLORIDE 40 MEQ: 750 CAPSULE, EXTENDED RELEASE ORAL at 08:07

## 2018-12-05 NOTE — PLAN OF CARE
Problem: Patient Care Overview  Goal: Plan of Care Review  Outcome: Ongoing (interventions implemented as appropriate)   12/05/18 0621   Coping/Psychosocial   Plan of Care Reviewed With patient;spouse   Plan of Care Review   Progress improving   OTHER   Outcome Summary Patient readmitted tonight after being d/c'd yesterday afternoon with reoccuring right sided neuro symptoms, an intracranial hematoma was discovered, NIH 1, only symptom on arrival to floor is right sided numbness, other VSS, will continue to monitor       Problem: Stroke (Hemorrhagic) (Adult)  Goal: Signs and Symptoms of Listed Potential Problems Will be Absent, Minimized or Managed (Stroke)  Outcome: Ongoing (interventions implemented as appropriate)   12/05/18 0621   Goal/Outcome Evaluation   Problems Assessed (Stroke (Hemorrhagic)) all   Problems Present (Stroke (Hemorrhagic)) acute neurologic deterioration;hemodynamic instability;motor/sensory impairment

## 2018-12-05 NOTE — PLAN OF CARE
Problem: Patient Care Overview  Goal: Plan of Care Review  Outcome: Ongoing (interventions implemented as appropriate)   12/05/18 0952   Coping/Psychosocial   Plan of Care Reviewed With patient;daughter   OTHER   Outcome Summary OT completed a brief chart review relating to presenting physical deficits. Pt Supervision for bed mobility and t/fs, though session limited as pt declined further activity d/t RUE pain at IV site. Recommend cont skilled IPOT POC. Recommend pt DC home w/ assist and OP rehab, however will monitor progress closely.        Problem: Stroke (Hemorrhagic) (Adult)  Goal: Signs and Symptoms of Listed Potential Problems Will be Absent, Minimized or Managed (Stroke)  Outcome: Ongoing (interventions implemented as appropriate)   12/05/18 0952   Goal/Outcome Evaluation   Problems Assessed (Stroke (Hemorrhagic)) cognitive impairment;communication impairment;motor/sensory impairment   Problems Present (Stroke (Hemorrhagic)) motor/sensory impairment

## 2018-12-05 NOTE — PLAN OF CARE
Problem: Patient Care Overview  Goal: Plan of Care Review  Outcome: Ongoing (interventions implemented as appropriate)   12/05/18 1307   Coping/Psychosocial   Plan of Care Reviewed With patient   OTHER   Outcome Summary PT initial evaluation completed for pt presenting with R sided numbness and weakness and decreased functional mobility. Pt ambulated 250ft with CGA 1+1, progressing to supervision. Pt's decreased independence warrants PT skilled care. Recommend D/C home with assistance and OP PT services.        Problem: Stroke (Hemorrhagic) (Adult)  Goal: Signs and Symptoms of Listed Potential Problems Will be Absent, Minimized or Managed (Stroke)  Outcome: Ongoing (interventions implemented as appropriate)   12/05/18 1305   Goal/Outcome Evaluation   Problems Assessed (Stroke (Hemorrhagic)) cognitive impairment;communication impairment;motor/sensory impairment;muscle tone abnormal   Problems Present (Stroke (Hemorrhagic)) none

## 2018-12-05 NOTE — THERAPY EVALUATION
Acute Care - Speech Language Pathology Initial Evaluation  [facility]   Cognitive-Communication Evaluation     Patient Name: Shweta Gonsales  : 1965  MRN: [id number]  Today's Date: 2018  Onset of Illness/Injury or Date of Surgery: 18     Referring Physician: ANDREW Teague      Admit Date: 2018     Visit Dx:    ICD-10-CM ICD-9-CM   1. Right-sided sensory deficit present R41.89 780.99   2. Subacute intracerebral hemorrhage (CMS/HCC) I61.9 431   3. 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    • Right-sided sensory deficit present     Past Medical History:   Diagnosis Date   • Diabetes mellitus (CMS/HCC)    • Hemorrhagic stroke (CMS/HCC)    • Hyperlipidemia    • Hypertension    • Ischemic cerebrovascular accident (CVA) (CMS/HCC) 2018   • RA (rheumatoid arthritis) (CMS/HCC)    • Stroke (CMS/HCC)      Past Surgical History:   Procedure Laterality Date   • CHOLECYSTECTOMY     • FRACTURE SURGERY     • HYSTERECTOMY          SLP EVALUATION (last 72 hours)      SLP SLC Evaluation     Row Name 18 1100 18 1230                Communication Assessment/Intervention    Document Type  evaluation  (Pended)   -TC  evaluation  -AV       Subjective Information  no complaints  (Pended)   -TC  no complaints  -AV       Patient Observations  alert;cooperative  (Pended)   -TC  alert;cooperative  -AV       Patient/Family Observations  Family present   (Pended)   -TC   present   -AV       Patient Effort  adequate  (Pended)   -TC  good  -AV       Symptoms Noted  During/After Treatment  none  (Pended)   -TC  --          General Information    Patient Profile Reviewed  yes  (Pended)   -TC  yes  -AV       Pertinent History Of Current Problem  Adm w/ c/o R wkns. H/o CVA (10/05/18) w/ residual R wkns and L parietal CVA (12/02/18).    (Pended)   -TC  rule out CVA  -AV       Precautions/Limitations, Vision  WFL;for purposes of eval  (Pended)   -TC  WFL;for purposes of eval  -AV       Precautions/Limitations, Hearing  WFL;for purposes of eval  (Pended)   -TC  WFL;for purposes of eval  -AV       Prior Level of Function-Communication  WFL  (Pended)   -TC  WFL  -AV       Plans/Goals Discussed with  patient and family;agreed upon  (Pended)   -TC  patient;spouse/S.O.  -AV       Barriers to Rehab  none identified  (Pended)   -TC  none identified  -AV       Patient's Goals for Discharge  patient did not state  (Pended)   -TC  return to home  -AV       Family Goals for Discharge  family did not state  (Pended)   -TC  patient able to return to previous activities/roles  -AV          Pain Assessment    Additional Documentation  Pain Scale: FACES Pre/Post-Treatment (Group)  (Pended)   -TC  Pain Scale: FACES Pre/Post-Treatment (Group)  -AV          Pain Scale: FACES Pre/Post-Treatment    Pain: FACES Scale, Pretreatment  0-->no hurt  (Pended)   -TC  0-->no hurt  -AV       Pain: FACES Scale, Post-Treatment  0-->no hurt  (Pended)   -TC  0-->no hurt  -AV          Comprehension Assessment/Intervention    Comprehension Assessment/Intervention  Auditory Comprehension;Reading Comprehension  (Pended)   -TC  Auditory Comprehension  -AV          Auditory Comprehension Assessment/Intervention    Auditory Comprehension (Communication)  WFL  (Pended)   -TC  WFL  -AV       Able to Identify Objects/Pictures (Communication)  WFL;pictures of common objects  (Pended)   -TC  WFL  -AV       Answers Questions (Communication)  WFL;yes/no;complex  (Pended)   -TC  --       Able to Follow Commands (Communication)   WFL;3-step  (Pended)   -TC  --          Reading Comprehension Assessment/Intervention    Reading Comprehension (Communication)  WFL  (Pended)   -TC  --       Scanning (Reading)  WFL;paragraphs  (Pended)   -TC  --       Paragraph Level  WFL  (Pended)   -TC  --       Reading Comprehension, Comment  goose down passage  (Pended)   -TC  --          Expression Assessment/Intervention    Expression Assessment/Intervention  verbal expression;graphic expression  (Pended)   -TC  verbal expression  -AV          Verbal Expression Assessment/Intervention    Verbal Expression  WFL  (Pended)   -TC  WFL  -AV       Automatic Speech (Communication)  WFL;response to greeting  (Pended)   -TC  --       Repetition  WFL;sentences  (Pended)   -TC  --       Phrase Completion  WFL;automatic/predictable  (Pended)   -TC  --       Responsive Naming  WFL;simple  (Pended)   -TC  --       Confrontational Naming  WFL;high frequency  (Pended)   -TC  --       Sentence Formulation  WFL;simple  (Pended)   -TC  --       Conversational Discourse/Fluency  WFL  (Pended)   -TC  --          Graphic Expression Assessment/Intervention    Graphic Expression  WFL  (Pended)   -TC  --       Graphic Expression, Comment  Pt able to construct a sentence using 2 key words   (Pended)   -TC  --          Oral Motor Structure and Function    Oral Motor Structure and Function  --  WFL  -AV       Dentition Assessment  --  natural, present and adequate  -AV       Mucosal Quality  --  moist, healthy  -AV          Oral Musculature and Cranial Nerve Assessment    Oral Motor General Assessment  --  WFL  -AV          Motor Speech Assessment/Intervention    Motor Speech Function  WFL  (Pended)   -TC  WFL  -AV       Automatic Speech (Communication)  WFL;response to greeting  (Pended)   -TC  --       Verbal Repetition (Communication)  WFL;sentences  (Pended)   -TC  --       Phase Completion  WFL;automatic/predictable  (Pended)   -TC  --          Cognitive Assessment Intervention-  SLP    Cognitive Function (Cognition)  WFL  (Pended)   -  WFL  -AV       Orientation Status (Cognition)  WFL  (Pended)   -TC  WFL  -AV       Memory (Cognitive)  mild impairment;immediate;delayed;other (see comments)  (Pended)  pt reports this at baseline   -  WFL  -AV       Attention (Cognitive)  WFL;sustained  (Pended)   -TC  WFL  -AV       Thought Organization (Cognitive)  WFL;concrete divergent;abstract convergent  (Pended)   -TC  WFL  -AV       Reasoning (Cognitive)  WFL;analogies  (Pended)   -TC  WFL  -AV       Problem Solving (Cognitive)  WFL;simple;divergent;temporal  (Pended)   -TC  WFL  -AV       Functional Math (Cognitive)  --  WFL  -AV       Executive Function (Cognition)  --  WFL  -AV       Pragmatics (Communication)  --  WFL  -AV          SLP Clinical Impressions    SLP Diagnosis  Cognitive-communication evaluation completed. Pt w/ mild cognitive deficits c/b difficulty w/ memory. However, pt reports this at baseline. Receptive and expressive language, motor-speech, reading, and writing present to be WFL. Pt reports no acute changes. SLP will sign off as pt has returned to baseline status w/ no acute cognitive-communication needs at this time.  (Pended)   -TC  --       SLC Criteria for Skilled Therapy Interventions Met  baseline status  (Pended)   -TC  --          Recommendations    Anticipated Dischage Disposition  unknown  (Pended)   -TC  --          SLP Discharge Summary    Discharge Destination  unknown  (Pended)   -TC  --       Discharge Diagnostic Statement  SLP will sign off as pt has returned to baseline w/ no acute cognitive-communication needs at this time.  (Pended)   -TC  --       Reason for Discharge  other (see comments)  (Pended)  baseline status  -TC  --         User Key  (r) = Recorded By, (t) = Taken By, (c) = Cosigned By    Initials Name Effective Dates    AV Laura Rai, MS CCC-SLP 04/03/18 -     TC Priya Alcantara, Speech Therapy Student 08/06/18 -               EDUCATION  The patient has been educated in the following areas:     Cognitive Impairment Communication Impairment.    SLP Recommendation and Plan    SLP Diagnosis: (P) Cognitive-communication evaluation completed. Pt w/ mild cognitive deficits c/b difficulty w/ memory. However, pt reports this at baseline. Receptive and expressive language, motor-speech, reading, and writing present to be WFL. Pt reports no acute changes. SLP will sign off as pt has returned to baseline status w/ no acute cognitive-communication needs at this time.         SLC Criteria for Skilled Therapy Interventions Met: (P) baseline status    Anticipated Dischage Disposition: (P) unknown                         Plan of Care Review  Plan of Care Reviewed With: (P) patient, family  Plan of Care Reviewed With: (P) patient, family                    Time Calculation:     Time Calculation- SLP     Row Name 12/05/18 1343             Time Calculation- SLP    SLP Start Time  1100  (Pended)   -TC      SLP Received On  12/05/18  (Pended)   -TC        User Key  (r) = Recorded By, (t) = Taken By, (c) = Cosigned By    Initials Name Provider Type    TC Priya Alcantara, Speech Therapy Student Speech Therapy Student          Therapy Charges for Today     Code Description Service Date Service Provider Modifiers Qty    [id number] HC ST EVAL SPEECH AND PROD W LANG  4 12/5/2018 Priya Alcantara, Speech Therapy Student GN 1                     Priya Alcantara, Speech Therapy Student  12/5/2018

## 2018-12-05 NOTE — OUTREACH NOTE
Prep Survey      Responses   Facility patient discharged from?  [facility]   Is patient eligible?  No   What are the reasons patient is not eligible?  Readmitted   Does the patient have one of the following disease processes/diagnoses(primary or secondary)?  Stroke (TIA)   Prep survey completed?  Yes          Tatiana Garzon RN

## 2018-12-05 NOTE — H&P
INTENSIVIST / PULMONARY INITIAL VISIT (CONSULT / H&P) NOTE     Hospital:  LOS: 0 days   Ms. Shweta Gonsales, 53 y.o. 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 female that presented to [facility] ED on 12/4 with complaints of right-sided numbness around 1800 that evening. Ms. Gonsales was previously admitted at [facility] from 12/2-12/4 with diagnosis of left parietal ischemic CVA with similar symptomatology. She was discharged on ASA 81mg and atorvostatin 80mg with neurology follow-up in one month.     Upon ED arrival she was noted to have mild sensory deficit on the right. NIH is 1. ICH calculated by ED MD is 0. BP did appear to be elevated with SBP >170 upon arrival that decreased to 130-140s spontaneously. CT head reveals increased density within the left thalamus, likely representative of hemorrhage. There is also a hypodense subacute infarct within the left periventricular region. CT perfusion study was obtained and consistent with delayed perfusion within the left thalamic region. MRI results correlate with findings suggestive of left thalamic subacute hemorrhage with restricted diffusion of the left periventricular region consistent with subacute infarct. MRA head/neck denies significant stenosis, although artifact limits evaluation of the proximal right common carotid artery and may have mild (50%) stenosis present.     Previously she had a hemorrhagic CVA with residual right-sided weakness in 10/2018 thought to be secondary to hypertension treated in  [place], [place].     She will be admitted to the neurosurgical ICU for higher level of care.     Upon ICU arrival Mrs. Gonsales is alert and oriented with stated improvement in RUE sensation. She does complain of a frontal headache. Vital signs are currently stable. There are no additional complaints.     Past Medical History:   Diagnosis Date   • Diabetes mellitus (CMS/HCC)    • Hemorrhagic stroke (CMS/HCC)    • Hyperlipidemia    • Hypertension    • Ischemic cerebrovascular accident (CVA) (CMS/HCC) 12/02/2018   • RA (rheumatoid arthritis) (CMS/HCC)    • Stroke (CMS/HCC)      Past Surgical History:   Procedure Laterality Date   • CHOLECYSTECTOMY     • FRACTURE SURGERY     • HYSTERECTOMY       History reviewed. No pertinent family history.  Social History     Socioeconomic History   • Marital status:      Spouse name: Not on file   • Number of children: Not on file   • Years of education: Not on file   • Highest education level: Not on file   Social Needs   • Financial resource strain: Not on file   • Food insecurity - worry: Not on file   • Food insecurity - inability: Not on file   • Transportation needs - medical: Not on file   • Transportation needs - non-medical: Not on file   Occupational History   • Not on file   Tobacco Use   • Smoking status: Never Smoker   • Smokeless tobacco: Never Used   Substance and Sexual Activity   • Alcohol use: No     Frequency: Never   • Drug use: No   • Sexual activity: Defer   Other Topics Concern   • Not on file   Social History Narrative   • Not on file     No Known Allergies  Current Facility-Administered Medications on File Prior to Encounter   Medication Dose Route Frequency Provider Last Rate Last Dose   • [DISCONTINUED] acetaminophen (TYLENOL) tablet 650 mg  650 mg Oral Q6H PRN Sean Swartz PA-C   650 mg at 12/03/18 0223   • [DISCONTINUED] amLODIPine (NORVASC) tablet 5 mg  5 mg Oral Q24H Rupal Alford DO   5 mg at 12/04/18 0931   • [DISCONTINUED] aspirin  suppository 300 mg  300 mg Rectal Daily Sean Swartz PA-C       • [DISCONTINUED] aspirin tablet 325 mg  325 mg Oral Daily Sean Swartz PA-C   325 mg at 12/04/18 0932   • [DISCONTINUED] atorvastatin (LIPITOR) tablet 80 mg  80 mg Oral Nightly Sean Swartz PA-C   80 mg at 12/03/18 2058   • [DISCONTINUED] dextrose (D50W) 25 g/ 50mL Intravenous Solution 25 g  25 g Intravenous Q15 Min PRN Sean Swartz PA-C       • [DISCONTINUED] dextrose (GLUTOSE) oral gel 15 g  15 g Oral Q15 Min PRN Sean Swartz PA-C       • [DISCONTINUED] glucagon (human recombinant) (GLUCAGEN DIAGNOSTIC) injection 1 mg  1 mg Subcutaneous PRN Sean Swartz PA-C       • [DISCONTINUED] hydrochlorothiazide (HYDRODIURIL) tablet 25 mg  25 mg Oral Daily Rupal Alford, DO   25 mg at 12/04/18 0931   • [DISCONTINUED] insulin lispro (humaLOG) injection 0-7 Units  0-7 Units Subcutaneous 4x Daily With Meals & Nightly Sean Swartz PA-C       • [DISCONTINUED] potassium chloride (KLOR-CON) packet 40 mEq  40 mEq Oral PRN Rupal Alford, DO       • [DISCONTINUED] potassium chloride (MICRO-K) CR capsule 10 mEq  10 mEq Oral Daily Rupal Alford, DO   10 mEq at 12/04/18 0932   • [DISCONTINUED] potassium chloride (MICRO-K) CR capsule 40 mEq  40 mEq Oral PRN Rupal Alford DO   40 mEq at 12/04/18 0546   • [DISCONTINUED] sodium chloride 0.9 % flush 10 mL  10 mL Intravenous PRN Will Beard MD       • [DISCONTINUED] sodium chloride 0.9 % flush 3 mL  3 mL Intravenous Q12H Sean Swartz PA-C   3 mL at 12/03/18 2059   • [DISCONTINUED] sodium chloride 0.9 % flush 3-10 mL  3-10 mL Intravenous PRN Sean Swartz PA-C         Current Outpatient Medications on File Prior to Encounter   Medication Sig Dispense Refill   • amLODIPine (NORVASC) 5 MG tablet Take 5 mg by mouth Daily.     • aspirin 81 MG tablet Take 1 tablet by mouth Daily. 30 tablet 1   • atorvastatin (LIPITOR) 80 MG tablet Take 1 tablet by mouth Every Night.  30 tablet 1   • glipiZIDE (GLUCOTROL) 5 MG tablet Take 5 mg by mouth Daily.     • hydrochlorothiazide (HYDRODIURIL) 25 MG tablet Take 25 mg by mouth Daily.     • losartan (COZAAR) 50 MG tablet Take 50 mg by mouth Daily.     • potassium chloride (MICRO-K) 10 MEQ CR capsule Take 1 capsule by mouth Daily. 30 capsule 0   • SITagliptin (JANUVIA) 50 MG tablet Take 50 mg by mouth Daily.         ROS:  Per HPI, all other systems were reviewed and were negative        OBJECTIVE     Vital Sign Min/Max for last 24 hours  Temp  Min: 97.9 °F (36.6 °C)  Max: 98.5 °F (36.9 °C)   BP  Min: 116/64  Max: 174/82   Pulse  Min: 56  Max: 97   Resp  Min: 14  Max: 18   SpO2  Min: 95 %  Max: 100 %   No Data Recorded     Telemetry:   NSR            General Appearance:  Conversant, in no acute distress  Eyes:  No scleral icterus or pallor, pupils normal  Ears, Nose, Mouth, Throat:  Atraumatic, oropharynx clear  Neck:  Trachea midline, thyroid normal  Respiratory:  Clear to auscultation bilaterally, normal effort, no tenderness to palpation  Cardiovascular:  Regular rate and rhythm, no murmurs, no peripheral edema, no thrill  Gastrointestinal:  Soft, non-tender, non-distended, no hepatosplenomegaly  Skin:  Normal temperature, no rash  Psychiatric:  Alert and oriented x 3, normal judgement and insight  Neuro:  Decreased sensation on RUE. Extremities equal in strength and movement throughout.     ICH Score:     Component Criteria Points     GCS 3-4 []   2      5-12 []    1     13-15 [x]     0   ICH Volume >= 30 mL []    1    IVH Yes []    1    Infratentorial Origin Yes []    1    Age >= 80 []    1      Total Points 0     SpO2: 98 % SpO2  Min: 95 %  Max: 100 %   Device:      Flow Rate:   No Data Recorded     Intake/Ouptut 24 hrs (7:00AM - 6:59 AM)  Intake & Output (last 3 days)     None        Lines/Drains/Airways: Peripheral IV(s)     Hematology:  Results from last 7 days   Lab Units  12/04/18 2123 12/02/18 2132   WBC 10*3/mm3  9.95  8.98    HEMOGLOBIN g/dL  14.0  12.9   HEMATOCRIT %  40.9  37.5   PLATELETS 10*3/mm3  380  313     Electrolytes, Magnesium and Phosphorus:  Results from last 7 days   Lab Units  12/04/18 2123 12/04/18 0610 12/03/18 0616   SODIUM mmol/L  138  139  139   POTASSIUM mmol/L  3.6  3.6  3.3*   CO2 mmol/L  28.0  27.0  30.0     Renal:  Results from last 7 days   Lab Units  12/04/18 2123 12/04/18 0610 12/03/18 0616  12/02/18   2132   CREATININE mg/dL  0.92  0.81  0.83  0.90   BUN mg/dL  19  13  14  18     Estimated Creatinine Clearance: 68.8 mL/min (by C-G formula based on SCr of 0.92 mg/dL).  Estimated Creatinine Clearance: 68.8 mL/min (by C-G formula based on SCr of 0.92 mg/dL).  Hepatic:  Results from last 7 days   Lab Units  12/04/18 2123 12/03/18 0616  12/02/18 2132   ALK PHOS U/L   --   103*  114*   BILIRUBIN mg/dL   --   0.5  0.4   ALT (SGPT) U/L  24  18  19   AST (SGOT) U/L  24  17  16     Arterial Blood Gases:        Results from last 7 days   Lab Units  12/03/18 0616   HEMOGLOBIN A1C %  6.60*       No results found for: LACTATE    Mri Angiogram Head Without Contrast    Result Date: 12/5/2018  Narrative: EXAM:  MR Angiogram Head Without Contrast, Arteries EXAM DATE/TIME:  12/4/2018 11:45 PM CLINICAL HISTORY:  53 years old, female; Signs and symptoms; Other: Right sided sensory deficit; Patient HX: Right sided sensory deficit right sided heaviness HX of stroke; Additional info: Right sided sensory deficit. Right sided sensory deficit right sided heaviness HX of stroke TECHNIQUE:  MR angiogram head without contrast. Exam focused on the arteries. COMPARISON:  MRI BRAIN WO CONTRAST 12/2/2018 10:57 PM FINDINGS:  Right internal carotid artery:  There is approximately 50% stenosis of the cavernous right internal carotid artery. 40% stenosis of the petrous right internal carotid artery.  Right anterior cerebral artery: No occlusion or significant stenosis. No aneurysm.  Right middle cerebral artery:  No  occlusion or significant stenosis. No aneurysm.  Right posterior cerebral artery: No occlusion or significant stenosis. No aneurysm.  Right vertebral artery: No occlusion or significant stenosis. No aneurysm.  Left internal carotid artery:  There is approximately 75% stenosis of the clinoid segment of the left internal carotid artery. Measurement of the stenosis can be accentuated by artifact. Mild stenosis (40%) of the petrous left internal carotid artery.  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  Left middle cerebral artery:  Mild stenoses of the M2 segments of the left middle cerebral artery. No significant stenosis of the left M1 segment.  Left posterior cerebral artery: No occlusion or significant stenosis. No aneurysm.  Left vertebral artery: No occlusion or significant stenosis. No aneurysm.  Basilar artery:  There is mild stenosis of the mid basilar artery. The degree of stenosis is approximately 35%. No occlusion.     Impression: 1. There is mild stenosis of the mid basilar artery. The degree of stenosis is approximately 35%. No occlusion. 2. There is approximately 75% stenosis of the clinoid segment of the left internal carotid artery. Measurement of the stenosis can be accentuated by artifact. Mild stenosis (40%) of the petrous left internal carotid artery. 3. There is approximately 50% stenosis of the cavernous right internal carotid artery. 40% stenosis of the petrous right internal carotid artery. 4. 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 5. Mild stenoses of the M2 segments of the left middle cerebral artery. THIS DOCUMENT HAS BEEN ELECTRONICALLY SIGNED BY BROOKE KRISHNA MD    Mri Angiogram Neck With Contrast    Result Date: 12/5/2018  Narrative: EXAM:   MR Angiography Neck With Intravenous Contrast EXAM DATE/TIME:   12/4/2018 11:48 PM CLINICAL HISTORY:   The patient age is 53 years old and is female; Signs and  symptoms; Other: Right sided sensory deficit; Patient HX: Right sided sensory deficit right sided heaviness HX of stroke 0.81 creat 74 gfr 13 ml multihance administered; Additional info: Right sided sensory deficit. Right sided sensory deficit right sided heaviness HX of stroke 0.81 creat 74 gfr 13 ml multihance administered Right sided sensory deficit right sided heaviness HX of stroke 0.81 creat 74 gfr 13 ml multihance administered TECHNIQUE:   Magnetic resonance angiography images of the neck with intravenous contrast. CONTRAST:   13 mL of Multihance administered intravenously.  COMPARISON:   US DUPLEX CAROTID BILATERAL CAR 12/3/2018 9:27 AM FINDINGS:   Right common carotid artery:  Artifact limits evaluation of the proximal right common carotid artery. Mild stenosis (less than 50%) is considered.  No significant stenosis or occlusion of the remaining right common carotid artery.   Right internal carotid artery:  Extracranial segment is patent with no significant stenosis.  No dissection or occlusion.   Right external carotid artery:  No occlusion.   Right vertebral artery:  No occlusion or significant stenosis.   Left common carotid artery:  No significant stenosis.  No dissection or occlusion.   Left internal carotid artery:  Extracranial segment is patent with no significant stenosis.  No dissection or occlusion.   Left external carotid artery:  No occlusion.   Left vertebral artery:  No occlusion or significant stenosis.  CAROTID STENOSIS REFERENCE USING NASCET CRITERIA:   % ICA stenosis = (1 - narrowest ICA diameter/diameter of distal cervical ICA) x 100.   Mild - <50% stenosis.   Moderate - 50-69% stenosis.   Severe - 70-94% stenosis.   Near occlusion - 95-99% stenosis.   Occluded - 100% stenosis.     Impression: 1.  No significant stenosis or occlusion of the extracranial internal carotid arteries bilaterally. 2.  Artifact limits evaluation of the proximal right common carotid artery. Mild stenosis (less  than 50%) is considered. THIS DOCUMENT HAS BEEN ELECTRONICALLY SIGNED BY BROOKE KRISHNA MD    Mri Brain Without Contrast    Addendum Date: 12/3/2018 Addendum:   Note: Critical Findings were discussed with FELICITY Ly at 12/3/2018 11:58 AM EST. The report was understood and acknowledged by the healthcare giver. THIS DOCUMENT HAS BEEN ELECTRONICALLY SIGNED BY GAVIN MENSAH MD    Result Date: 12/3/2018  Narrative: EXAM:  MR Head Without Contrast EXAM DATE/TIME:  12/2/2018 11:11 PM CLINICAL HISTORY:  53 years old, female; Hypokalemia; Paresthesia of skin; Personal history of transient ischemic attack (tia), and cerebral infarction without residual deficits; Signs and symptoms; Weakness, extremity; Right; Patient HX: Right sided heaviness, right sided deficits from stroke 10/5/18; Additional info: R sided heaviness TECHNIQUE:  MR of the head without contrast. COMPARISON:  No relevant prior studies available. FINDINGS:  Brain:  Small acute diffusion abnormality in left parietal periventricular white matter, consistent with acute small infarct. Chronic mild microvascular ischemic disease of bilateral cerebral white matter. Chronic-appearing trianglar-shaped low signal at left thalamus likely to be old infarct and/or calcification. Bilateral cerebellopontine angle structures are unremarkable. No downward herniation, midline shift of brain, or acute mass effect.  No acute intracranial hemorrhage visualized.  Ventricles:  No hydrocephalus. Unremarkable.  Bones/joints: No acute abnormality.  Minimal osteophytes at cervical spine.  Soft tissues:  Soft tissue swelling of nasal turbinates.  Sinuses:  Mild bilateral ethmoid and right maxillary sinusitis.  Mastoid air cells:  No mastoid effusion.  Orbits:  Orbital structures are unremarkable, as visualized.  Nasopharynx:  Deviation of the nasal septum.     Impression: 1. Small acute infarct in left parietal periventricular white matter. 2. Chronic mild microvascular ischemic  disease of bilateral cerebral white matter. 3. Suspect chronic left thalamic infarct. 4.  No acute intracranial hemorrhage visualized. 5.  Mild bilateral ethmoid and right maxillary sinusitis. THIS DOCUMENT HAS BEEN ELECTRONICALLY SIGNED BY GAVIN MENSAH MD    Mri Brain With & Without Contrast    Result Date: 12/5/2018  Narrative: EXAM:  MR Head Without and With Intravenous Contrast EXAM DATE/TIME:  12/4/2018 11:46 PM CLINICAL HISTORY:  53 years old, female; Signs and symptoms; Other: Right sided sensory deficit; Patient HX: Right sided sensory deficit right sided heaviness HX of stroke 0.81 creat 74 gfr 13 ml multihance administered; Additional info: Right sided sensory deficit. Concerns noted on head CT. Right sided sensory deficit right sided heaviness HX of stroke 0.81 creat 74 gfr 13 ml multihance administered TECHNIQUE:  MR of the head without and with intravenous contrast. CONTRAST:  13 ml of Multihance administered intravenously. COMPARISON:  MRI BRAIN WO CONTRAST 12/2/2018 10:57 PM FINDINGS:  Brain:  Within the left thalamus, T2 hypointense hemosiderin or calcification is visualized. There is a peripheral rim of T1 hyperintensity, which has mildly progressed. This likely represents subacute hemorrhage. T1 iso-to hyperintensity is again visualized centrally. Within the posterior aspect of the left thalamus, mild hyperintensity is identified on the diffusion sequence, which is isointense on the ADC trace sequence. This can be secondary to the hemorrhage or subacute infarction. Restricted diffusion is again identified left periventricular region, consistent with a subacute infarct. A focus of FLAIR hyperintensity is visualized in this region. No abnormally enhancing intracranial mass is visualized. There are scattered nonspecific additional foci of high FLAIR signal intensity within the cerebral white matter. There is no mass effect or restricted diffusion associated with these foci. This is suggestive of  chronic small vessel ischemic disease. Demyelination is within the differential. No abnormally enhancing intracranial mass is visualized. A subcentimeter pineal cyst is visualized.  Ventricles:  Mild ventriculomegaly.  Bones/joints: Unremarkable.  Soft tissues: Normal.  Sinuses: Mild mucosal thickening of the ethmoid air cells. Magnetic susceptibility artifact limits evaluation of the left maxillary sinus.  Mastoid air cells: No mastoid effusion.  Orbits: Evaluation of the optic globes is limited by motion artifact.     Impression: 1. Within the left thalamus, hemosiderin or calcification is visualized. There is a peripheral rim of T1 hyperintensity, which has mildly progressed. This likely represents subacute hemorrhage. Within the posterior aspect of the left thalamus, mild hyperintensity is identified on the diffusion sequence. This can be secondary to the hemorrhage or subacute infarction. 2. Restricted diffusion is again identified left periventricular region, consistent with a subacute infarct. 3. There are scattered nonspecific additional foci of high FLAIR signal intensity within the cerebral white matter. This is suggestive of chronic small vessel ischemic disease. Demyelination is within the differential. 4. Mild ventriculomegaly. Findings are discussed with  Dr Valencia, 12/5/2018 12:47 AM EST. The findings were acknowledged and understood. THIS DOCUMENT HAS BEEN ELECTRONICALLY SIGNED BY BROOKE KRISHNA MD    Xr Chest 1 View    Result Date: 12/4/2018  Narrative: EXAM:  XR Chest, 1 View EXAM DATE/TIME:  12/4/2018 9:34 PM CLINICAL HISTORY:  53 years old, female; Signs and symptoms; Other: See notes; Additional info: Acute stroke protocol (onset < 12 hrs) TECHNIQUE:  XR of the chest, 1 view. COMPARISON:  CR XR CHEST 1 VW 12/2/2018 9:56 PM FINDINGS:  Lungs:  The lungs are clear. There is no confluent infiltrate.  Pleural space: No pleural effusion. No pneumothorax.  Heart/Mediastinum:  The cardiac silhouette is  stable in size.  Bones/joints: No visualized acute osseous abnormality.      Impression: The lungs are clear. There is no confluent infiltrate. THIS DOCUMENT HAS BEEN ELECTRONICALLY SIGNED BY BROOKE KRISHNA MD    Xr Chest 1 View    Result Date: 12/2/2018  Narrative: EXAM:  XR Chest, 1 View EXAM DATE/TIME:  12/2/2018 9:45 PM CLINICAL HISTORY:  53 years old, female; Hypokalemia; Paresthesia of skin; Personal history of transient ischemic attack (tia), and cerebral infarction without residual deficits; Signs and symptoms; Cough TECHNIQUE:  XR of the chest, 1 view. COMPARISON:  No relevant prior studies available. FINDINGS:  Lungs:  No acute infiltrates. No pneumonia or CHF.  Pleural space:  No pneumothorax. No pleural effusion.  Heart/Mediastinum:  Normal heart size. No acute mediastinal abnormality visualized.  Bones/joints:  No acute abnormality seen along the well-visualized osseous structures.  Soft tissues:  RUQ surgical clips.     Impression: No acute cardiopulmonary disease demonstrated. THIS DOCUMENT HAS BEEN ELECTRONICALLY SIGNED BY GAVIN MENSAH MD    Ct Head Without Contrast Stroke Protocol    Addendum Date: 12/4/2018 Addendum:   THIS REPORT CONTAINS FINDINGS THAT MAY BE CRITICAL TO PATIENT CARE. The findings were verbally communicated via telephone conference with CB LEE at 10:06 PM EST on 12/4/2018. The findings were acknowledged and understood. THIS DOCUMENT HAS BEEN ELECTRONICALLY SIGNED BY BROOKE KRISHNA MD    Result Date: 12/4/2018  Narrative: EXAM:  CT Head Without Intravenous Contrast EXAM DATE/TIME:  12/4/2018 9:26 PM CLINICAL HISTORY:  53 years old, female; Signs and symptoms; Weakness, extremity; Right; Additional info: Stroke TECHNIQUE:  Axial computed tomography images of the head/brain without intravenous contrast.  All CT scans at this facility use at least one of these dose optimization techniques: automated exposure control; mA and/or kV adjustment per patient size (includes targeted  exams where dose is matched to clinical indication); or iterative reconstruction. COMPARISON:  MRI BRAIN WO CONTRAST 12/2/2018 10:57 PM FINDINGS:  Brain:  Subtle increased density is identified within the left thalamus, suggestive of calcification or hemorrhage. Hemorrhagic conversion of infarction is within the differential. There is a hypodense subacute infarct in the left periventricular region. This was acute on the prior MRI brain from 12/2/18. There are scattered additional foci of hypodensity, likely representing small vessel ischemic disease in a patient this age. The acuity of the white matter disease is indeterminate. The white-gray differentiation is otherwise preserved.  Midline shift:  There is no midline shift.  Ventricles:  There is mild prominence of the ventricles and sulci, compatible with atrophy.  Bones/joints:  The calvarium demonstrates no evidence for a depressed fracture.  Sinuses:  Mild mucosal thickening of scattered ethmoid air cells.  Mastoid air cells: No mastoid effusion.  Soft tissues: Normal.  Vasculature:  Hyperdensity is identified within the basilar artery, and thrombus cannot be excluded. There is atherosclerotic calcification of the intracranial internal carotid arteries.     Impression: 1. Subtle increased density is identified within the left thalamus, suggestive of calcification or hemorrhage. Hemorrhagic conversion of infarction is within the differential. Correlation with MRI is recommended. 2. There is a hypodense subacute infarct in the left periventricular region. 3. Hyperdensity is identified within the basilar artery, and thrombus cannot be excluded. This can be further evaluated with CTA or MRA. 4. There are scattered additional foci of hypodensity, likely representing small vessel ischemic disease in a patient this age. 5. Mild atrophy. COMMENT: [place] Stroke Program Early CT Score (ASPECTS) = 10 THIS DOCUMENT HAS BEEN ELECTRONICALLY SIGNED BY BROOKE KRISHNA MD    Ct  Cerebral Perfusion With & Without Contrast    Addendum Date: 12/5/2018 Addendum:   When correlated with the MRI brain from the same day, the delayed perfusion within the left thalamus can be contributed by the subacute hemorrhage and hemosiderin in this region. Posteriorly within the left thalamus, subacute infarction was also considered on MRI. Refer to this MRI report for further discussion. THIS DOCUMENT HAS BEEN ELECTRONICALLY SIGNED BY BROOKE KRISHNA MD    Result Date: 12/5/2018  Narrative: EXAM:   CT Brain Perfusion With Intravenous Contrast EXAM DATE/TIME:   12/4/2018 9:26 PM CLINICAL HISTORY:   The patient age is 53 years old and is female; Signs and symptoms; Other: Right sided heaviness/tingling in arm; Additional info: Stroke; Rt. Sided heaviness and tingling in arm; H/o strokes Stroke; Rt. Sided heaviness and tingling; H/o strokes TECHNIQUE:   Axial computed tomography images of the brain with intravenous contrast using cerebral perfusion protocol.  Post-processing parametric maps were created and reviewed.  These include cerebral blood flow, cerebral blood volume and mean transit time.  All CT scans at this facility use at least one of these dose optimization techniques: automated exposure control; mA and/or kV adjustment per patient size (includes targeted exams where dose is matched to clinical indication); or iterative reconstruction.   MIP reconstructed images were created and reviewed. CONTRAST:   40 mL of Isovue 370 administered intravenously.  COMPARISON:   MRI BRAIN WO CONTRAST 12/2/2018 10:57 PM FINDINGS:   Cerebral blood flow: See below.   Cerebral blood volume: See below.   Mean transit time:  There is a focus of delayed perfusion within the left thalamus on Mean Transit Time and TTDA images. There is decreased cerebral blood flow and cerebral blood volume in this region. These findings are consistent with an acute infarct.  A subacute infarct in the left periventricular region is visualized  on the CT head from the same day, but this is not well visualized on CT perfusion imaging.   Brain:  Refer to the CT head report from the same day.   Other findings:  CBF<30% volume: 0 ml.  Tmax>6.0s volume: 0 ml.  Mismatch volume: 0 ml. Mismatch ratio: none.     Impression: 1.  There is a focus of delayed perfusion within the left thalamus. There is decreased cerebral blood flow and cerebral blood volume in this region. These findings are consistent with an acute infarct. 2.  If further evaluation is clinically indicated, an MRI of the brain is recommended. THIS REPORT CONTAINS FINDINGS THAT MAY BE CRITICAL TO PATIENT CARE. The findings were verbally communicated via telephone conference with CB LEE at 10:06 PM EST on 12/4/2018. The findings were acknowledged and understood. THIS DOCUMENT HAS BEEN ELECTRONICALLY SIGNED BY BROOKE KRISHNA MD    Relevant imaging studies and labs from 12/05/18 were reviewed and interpreted by me    Medications (drips):    niCARdipine       amLODIPine 5 mg Oral Daily   atorvastatin 80 mg Oral Nightly   hydrochlorothiazide 25 mg Oral Daily   losartan 50 mg Oral Daily   potassium chloride 10 mEq Oral Daily   sodium chloride 3 mL Intravenous Q12H       Assessment/Plan   IMPRESSION / PLAN     Inpatient Problem List:  53 y.o.female:  Active Hospital Problems    Diagnosis   • **Left thalamic intraparenchymal hematoma of brain    • Essential hypertension   • Type 2 diabetes mellitus      HA1C 6.6 on 12/3      • Hyperlipemia on statin therapy    • 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  Admit to ICU   2. Implement hemorrhagic stroke order set   3. Nicardipine for SBP >140   4. Neurology & Neurosurgery consult in AM   5. AM labs (no lipid panel or HA1C ordered since just performed on 12/3)   6. Restart home antihypertensives  7. Avoid blood thinning agents    Critical Care time spent in direct patient care: 30 minutes (excluding procedure time, if applicable) including high complexity decision making to assess, manipulate, and support vital organ system failure in this individual who has impairment of one or more vital organ systems such that there is a high probability of imminent or life threatening deterioration in the patient’s condition.     Karolina Teague, APRN, [facility]   Pulmonary and Critical Care     I have seen and examined the patient, performing a face-to-face diagnostic evaluation with plan of care reviewed and developed with APRN and nursing staff. I have addended and modified the above history of present illness, physical examination, and assessment and plan to reflect my findings and impressions.    Charles Quiles MD  Pulmonology and Critical Care Medicine  12/05/18 6:27 AM  Electronically Signed        Charles Qulies MD  Intensive Care Medicine  12/05/18 4:12 AM

## 2018-12-05 NOTE — CONSULTS
"    [facility]    Referring Provider: intensivist  Reason for Consultation: hemorrhage  Primary Provider: Dr. Jazmine Craig    [phone]    Shweta Gonsales is a 53 y.o. female     Chief Complaint   Patient presents with   • Stroke     HPI   Pleasant 53-year-old -American female, right handed, was admitted here on 12/2/18 with right sided \"heaviness\". MRI of the brain read as new acute ischemic stroke in the left parietal periventricular matter and she was admitted to the ICU.  She had improvement in her sided weakness especially the right arm and she was discharged on 12/4/2018 on aspirin and atorvastatin. She was readmitted on the same day, 12/4/2018, with recurrent right sided numbness, especially in the right leg.     Patient was in [place] in October and was hospitalized with a left thalamic stroke that presented with right arm and leg weakness. From that hospital discharge she was placed on Hctz and was to f/u with PCP. The  reports that her circumferential gait was improving until the late evening on Dec 2nd when she was admitted here with 2 days of increased heaviness in the right side.    Neurosurgery has been asked to see regarding stroke and abnormal findings on MRI and MRA.      No Known Allergies      Current Facility-Administered Medications:   •  acetaminophen (TYLENOL) tablet 650 mg, 650 mg, Oral, Q4H PRN, 650 mg at 12/05/18 0427 **OR** acetaminophen (TYLENOL) suppository 650 mg, 650 mg, Rectal, Q4H PRN, Karolina Teague, APRN  •  amLODIPine (NORVASC) tablet 5 mg, 5 mg, Oral, Daily, Karolina Teague, APRN, 5 mg at 12/05/18 0807  •  atorvastatin (LIPITOR) tablet 80 mg, 80 mg, Oral, Nightly, Karolina Teague, APRN  •  dextrose (D50W) 25 g/ 50mL Intravenous Solution 25 g, 25 g, Intravenous, Q15 Min PRN, Charles Quiles MD  •  dextrose (GLUTOSE) oral gel 15 g, 15 g, Oral, Q15 Min PRN, Charles Quiles MD  •  " glucagon (human recombinant) (GLUCAGEN DIAGNOSTIC) injection 1 mg, 1 mg, Subcutaneous, PRN, Charles Quiles MD  •  hydrochlorothiazide (HYDRODIURIL) tablet 25 mg, 25 mg, Oral, Daily, Karolina Teague APRN, 25 mg at 12/05/18 0807  •  insulin lispro (humaLOG) injection 0-7 Units, 0-7 Units, Subcutaneous, 4x Daily With Meals & Nightly, Charles Quiles MD  •  losartan (COZAAR) tablet 50 mg, 50 mg, Oral, Daily, Karolina Teague APRN, 50 mg at 12/05/18 0807  •  Magnesium Sulfate 2 gram Bolus, followed by 8 gram infusion (total Mg dose 10 grams)- Mg less than or equal to 1mg/dL, 2 g, Intravenous, PRN **OR** Magnesium Sulfate 2 gram / 50mL Infusion (GIVE X 3 BAGS TO EQUAL 6GM TOTAL DOSE) - Mg 1.1 - 1.5 mg/dl, 2 g, Intravenous, PRN **OR** Magnesium Sulfate 4 gram infusion- Mg 1.6-1.9 mg/dL, 4 g, Intravenous, PRN, Charles Quiles MD, Last Rate: 25 mL/hr at 12/05/18 0805, 4 g at 12/05/18 0805  •  niCARdipine (CARDENE-IV) 20 mg/200 mL (0.1 mg/mL) in 0.9% NaCl infusion, 5-15 mg/hr, Intravenous, Titrated, Karolina Teague APRN, Last Rate: 50 mL/hr at 12/05/18 1054, 5 mg/hr at 12/05/18 1054  •  ondansetron (ZOFRAN) injection 4 mg, 4 mg, Intravenous, Q6H PRN, Karolina Teague APRN  •  potassium & sodium phosphates (PHOS-NAK) 280-160-250 MG packet - for Phosphorus less than 1.25 mg/dL, 2 packet, Oral, Q6H PRN **OR** potassium & sodium phosphates (PHOS-NAK) 280-160-250 MG packet - for Phosphorus 1.25 - 2.5 mg/dL, 2 packet, Oral, Once PRN, Charles Quiles MD  •  potassium chloride (MICRO-K) CR capsule 40 mEq, 40 mEq, Oral, PRN, 40 mEq at 12/05/18 0807 **OR** potassium chloride (KLOR-CON) packet 40 mEq, 40 mEq, Oral, PRN **OR** potassium chloride 10 mEq in 100 mL IVPB, 10 mEq, Intravenous, Q1H PRN, Charles Quiles MD  •  potassium chloride (MICRO-K) CR capsule 10 mEq, 10 mEq, Oral, Daily, Karolina Teague, APRN  •  sodium chloride 0.9 % flush 10 mL, 10 mL, Intravenous, PRN,  "Jamarcus Thompson, DO  •  sodium chloride 0.9 % flush 3 mL, 3 mL, Intravenous, Q12H, Karolina Teague, APRN  •  sodium chloride 0.9 % flush 3-10 mL, 3-10 mL, Intravenous, PRN, Karolina Teague W, APRN    Past Medical History:   Diagnosis Date   • Diabetes mellitus (CMS/HCC)    • Hemorrhagic stroke (CMS/[facility])    • Hyperlipidemia    • Hypertension    • Ischemic cerebrovascular accident (CVA) (CMS/[facility]) 12/02/2018   • RA (rheumatoid arthritis) (CMS/[facility])    • Stroke (CMS/[facility])        Past Surgical History:   Procedure Laterality Date   • CHOLECYSTECTOMY     • FRACTURE SURGERY     • HYSTERECTOMY         Social History     Socioeconomic History   • Marital status:      Spouse name: Not on file   • Number of children: Not on file   • Years of education: Not on file   • Highest education level: Not on file   Tobacco Use   • Smoking status: Never Smoker   • Smokeless tobacco: Never Used   Substance and Sexual Activity   • Alcohol use: No     Frequency: Never   • Drug use: No   • Sexual activity: Defer     Family Hx: noncontributory    Review of Systems  Right leg heaviness, otherwise all 10-system ROS is reviewed and negative.    Physical Exam:  /65   Pulse 85   Temp 98.4 °F (36.9 °C) (Oral)   Resp 20   Ht 157.5 cm (62\")   Wt 77.1 kg (170 lb)   SpO2 97%   BMI 31.09 kg/m²     HEENT:WNL  Neck:supple  Lungs:normal expansion  COR:RRR  Abd:non-tender  EXT:+pulses, -edema    Neuro Exam:  A/A/C/O    CRANIAL NERVES:  Cranial nerve II:  Visual fields are full to confrontation.  Cranial nerves III, IV and VI:  PERRLADC.  Extraocular movements are intact.  Nystagmus is not present.  Cranial nerve V:  Facial sensation is intact to light touch.  Cranial nerve VII:  Muscles of facial expression reveal no asymmetry.  Cranial nerve VIII:  Hearing is intact to finger rub bilaterally.  Cranial nerves IX and X:  Palate elevates symmetrically.  Cranial nerve XI:  Shoulder shrug is intact.  Cranial nerve XII:  Tongue is midline without " evidence of atrophy or fasciculation.    Musculoskeletal exam:  No focal weakness to direct testing the lower extremities, coordination intact.    Radiological studies:  Imaging Results (last 24 hours)     Procedure Component Value Units Date/Time    MRI Angiogram Neck With Contrast [[id number]] Collected:  12/04/18 2348     Updated:  12/05/18 0107    Narrative:       EXAM:    MR Angiography Neck With Intravenous Contrast    EXAM DATE/TIME:    12/4/2018 11:48 PM    CLINICAL HISTORY:    The patient age is 53 years old and is female; Signs and symptoms; Other:   Right sided sensory deficit; Patient HX: Right sided sensory deficit right   sided heaviness HX of stroke 0.81 creat 74 gfr 13 ml multihance administered;   Additional info: Right sided sensory deficit. Right sided sensory deficit right   sided heaviness HX of stroke 0.81 creat 74 gfr 13 ml multihance administered   Right sided sensory deficit right sided heaviness HX of stroke 0.81 creat 74   gfr 13 ml multihance administered    TECHNIQUE:    Magnetic resonance angiography images of the neck with intravenous contrast.    CONTRAST:    13 mL of Multihance administered intravenously.      COMPARISON:    US DUPLEX CAROTID BILATERAL CAR 12/3/2018 9:27 AM    FINDINGS:    Right common carotid artery:  Artifact limits evaluation of the proximal   right common carotid artery. Mild stenosis (less than 50%) is considered.  No   significant stenosis or occlusion of the remaining right common carotid artery.    Right internal carotid artery:  Extracranial segment is patent with no   significant stenosis.  No dissection or occlusion.    Right external carotid artery:  No occlusion.    Right vertebral artery:  No occlusion or significant stenosis.      Left common carotid artery:  No significant stenosis.  No dissection or   occlusion.    Left internal carotid artery:  Extracranial segment is patent with no   significant stenosis.  No dissection or occlusion.    Left  external carotid artery:  No occlusion.    Left vertebral artery:  No occlusion or significant stenosis.       CAROTID STENOSIS REFERENCE USING NASCET CRITERIA:    % ICA stenosis = (1 - narrowest ICA diameter/diameter of distal cervical ICA)   x 100.    Mild - <50% stenosis.    Moderate - 50-69% stenosis.    Severe - 70-94% stenosis.    Near occlusion - 95-99% stenosis.    Occluded - 100% stenosis.      Impression:       1.  No significant stenosis or occlusion of the extracranial internal carotid   arteries bilaterally.  2.  Artifact limits evaluation of the proximal right common carotid artery.   Mild stenosis (less than 50%) is considered.    THIS DOCUMENT HAS BEEN ELECTRONICALLY SIGNED BY BROOKE KRISHNA MD    MRI Angiogram Head Without Contrast [[id number]] Collected:  12/04/18 2345     Updated:  12/05/18 0101    Narrative:       EXAM:    MR Angiogram Head Without Contrast, Arteries     EXAM DATE/TIME:    12/4/2018 11:45 PM     CLINICAL HISTORY:    53 years old, female; Signs and symptoms; Other: Right sided sensory deficit;   Patient HX: Right sided sensory deficit right sided heaviness HX of stroke;   Additional info: Right sided sensory deficit. Right sided sensory deficit right   sided heaviness HX of stroke     TECHNIQUE:    MR angiogram head without contrast. Exam focused on the arteries.     COMPARISON:    MRI BRAIN WO CONTRAST 12/2/2018 10:57 PM     FINDINGS:    Right internal carotid artery:  There is approximately 50% stenosis of the   cavernous right internal carotid artery. 40% stenosis of the petrous right   internal carotid artery.    Right anterior cerebral artery: No occlusion or significant stenosis. No   aneurysm.    Right middle cerebral artery:  No occlusion or significant stenosis. No   aneurysm.    Right posterior cerebral artery: No occlusion or significant stenosis. No   aneurysm.    Right vertebral artery: No occlusion or significant stenosis. No aneurysm.      Left internal carotid  artery:  There is approximately 75% stenosis of the   clinoid segment of the left internal carotid artery. Measurement of the   stenosis can be accentuated by artifact. Mild stenosis (40%) of the petrous   left internal carotid artery.    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    Left middle cerebral artery:  Mild stenoses of the M2 segments of the left   middle cerebral artery. No significant stenosis of the left M1 segment.    Left posterior cerebral artery: No occlusion or significant stenosis. No   aneurysm.    Left vertebral artery: No occlusion or significant stenosis. No aneurysm.    Basilar artery:  There is mild stenosis of the mid basilar artery. The degree   of stenosis is approximately 35%. No occlusion.       Impression:       1. There is mild stenosis of the mid basilar artery. The degree of stenosis is   approximately 35%. No occlusion.   2. There is approximately 75% stenosis of the clinoid segment of the left   internal carotid artery. Measurement of the stenosis can be accentuated by   artifact. Mild stenosis (40%) of the petrous left internal carotid artery.   3. There is approximately 50% stenosis of the cavernous right internal carotid   artery. 40% stenosis of the petrous right internal carotid artery.   4. 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   5. Mild stenoses of the M2 segments of the left middle cerebral artery.     THIS DOCUMENT HAS BEEN ELECTRONICALLY SIGNED BY BROOKE KRISHNA MD    CT Cerebral Perfusion With & Without Contrast [[id number]] Collected:  12/04/18 2126     Updated:  12/05/18 0050    Addenda:        When correlated with the MRI brain from the same day, the delayed   perfusion   within the left thalamus can be contributed by the subacute hemorrhage and     hemosiderin in this region. Posteriorly within the left thalamus, subacute     infarction was also considered on MRI. Refer to this  MRI report for   further   discussion.    THIS DOCUMENT HAS BEEN ELECTRONICALLY SIGNED BY GEORGI NAVA MD  Signed:  12/05/18 0050 by Georgi Nava MD    Narrative:       EXAM:    CT Brain Perfusion With Intravenous Contrast    EXAM DATE/TIME:    12/4/2018 9:26 PM    CLINICAL HISTORY:    The patient age is 53 years old and is female; Signs and symptoms; Other:   Right sided heaviness/tingling in arm; Additional info: Stroke; Rt. Sided   heaviness and tingling in arm; H/o strokes Stroke; Rt. Sided heaviness and   tingling; H/o strokes    TECHNIQUE:    Axial computed tomography images of the brain with intravenous contrast using   cerebral perfusion protocol.  Post-processing parametric maps were created and   reviewed.  These include cerebral blood flow, cerebral blood volume and mean   transit time.  All CT scans at this facility use at least one of these dose   optimization techniques: automated exposure control; mA and/or kV adjustment   per patient size (includes targeted exams where dose is matched to clinical   indication); or iterative reconstruction.    MIP reconstructed images were created and reviewed.    CONTRAST:    40 mL of Isovue 370 administered intravenously.      COMPARISON:    MRI BRAIN WO CONTRAST 12/2/2018 10:57 PM    FINDINGS:    Cerebral blood flow: See below.    Cerebral blood volume: See below.    Mean transit time:  There is a focus of delayed perfusion within the left   thalamus on Mean Transit Time and TTDA images. There is decreased cerebral   blood flow and cerebral blood volume in this region. These findings are   consistent with an acute infarct.  A subacute infarct in the left   periventricular region is visualized on the CT head from the same day, but this   is not well visualized on CT perfusion imaging.      Brain:  Refer to the CT head report from the same day.    Other findings:  CBF<30% volume: 0 ml.  Tmax>6.0s volume: 0 ml.  Mismatch   volume: 0 ml. Mismatch ratio:  none.      Impression:       1.  There is a focus of delayed perfusion within the left thalamus. There is   decreased cerebral blood flow and cerebral blood volume in this region. These   findings are consistent with an acute infarct.  2.  If further evaluation is clinically indicated, an MRI of the brain is   recommended.    THIS REPORT CONTAINS FINDINGS THAT MAY BE CRITICAL TO PATIENT CARE. The   findings were verbally communicated via telephone conference with CB LEE   at 10:06 PM EST on 12/4/2018. The findings were acknowledged and understood.    THIS DOCUMENT HAS BEEN ELECTRONICALLY SIGNED BY BROOKE KRISHNA MD    MRI Brain With & Without Contrast [[id number]] Collected:  12/04/18 2346     Updated:  12/05/18 0048    Narrative:       EXAM:    MR Head Without and With Intravenous Contrast     EXAM DATE/TIME:    12/4/2018 11:46 PM     CLINICAL HISTORY:    53 years old, female; Signs and symptoms; Other: Right sided sensory deficit;   Patient HX: Right sided sensory deficit right sided heaviness HX of stroke 0.81   creat 74 gfr 13 ml multihance administered; Additional info: Right sided   sensory deficit. Concerns noted on head CT. Right sided sensory deficit right   sided heaviness HX of stroke 0.81 creat 74 gfr 13 ml multihance administered     TECHNIQUE:    MR of the head without and with intravenous contrast.     CONTRAST:    13 ml of Multihance administered intravenously.     COMPARISON:    MRI BRAIN WO CONTRAST 12/2/2018 10:57 PM     FINDINGS:    Brain:  Within the left thalamus, T2 hypointense hemosiderin or calcification   is visualized. There is a peripheral rim of T1 hyperintensity, which has mildly   progressed. This likely represents subacute hemorrhage. T1 iso-to   hyperintensity is again visualized centrally. Within the posterior aspect of   the left thalamus, mild hyperintensity is identified on the diffusion sequence,   which is isointense on the ADC trace sequence. This can be secondary to the    hemorrhage or subacute infarction. Restricted diffusion is again identified   left periventricular region, consistent with a subacute infarct. A focus of   FLAIR hyperintensity is visualized in this region. No abnormally enhancing   intracranial mass is visualized. There are scattered nonspecific additional   foci of high FLAIR signal intensity within the cerebral white matter. There is   no mass effect or restricted diffusion associated with these foci. This is   suggestive of chronic small vessel ischemic disease. Demyelination is within   the differential. No abnormally enhancing intracranial mass is visualized. A   subcentimeter pineal cyst is visualized.   Ventricles:  Mild ventriculomegaly.    Bones/joints: Unremarkable.    Soft tissues: Normal.    Sinuses: Mild mucosal thickening of the ethmoid air cells. Magnetic   susceptibility artifact limits evaluation of the left maxillary sinus.    Mastoid air cells: No mastoid effusion.    Orbits: Evaluation of the optic globes is limited by motion artifact.       Impression:       1. Within the left thalamus, hemosiderin or calcification is visualized. There   is a peripheral rim of T1 hyperintensity, which has mildly progressed. This   likely represents subacute hemorrhage. Within the posterior aspect of the left   thalamus, mild hyperintensity is identified on the diffusion sequence. This can   be secondary to the hemorrhage or subacute infarction.   2. Restricted diffusion is again identified left periventricular region,   consistent with a subacute infarct.   3. There are scattered nonspecific additional foci of high FLAIR signal   intensity within the cerebral white matter. This is suggestive of chronic small   vessel ischemic disease. Demyelination is within the differential.  4. Mild ventriculomegaly.     Findings are discussed with  Dr Valencia, 12/5/2018 12:47 AM EST. The findings   were acknowledged and understood.    THIS DOCUMENT HAS BEEN ELECTRONICALLY  SIGNED BY GEORGI NAVA MD    XR Chest 1 View [[id number]] Collected:  12/04/18 2134     Updated:  12/04/18 2302    Narrative:       EXAM:    XR Chest, 1 View     EXAM DATE/TIME:    12/4/2018 9:34 PM     CLINICAL HISTORY:    53 years old, female; Signs and symptoms; Other: See notes; Additional info:   Acute stroke protocol (onset < 12 hrs)     TECHNIQUE:    XR of the chest, 1 view.     COMPARISON:    CR XR CHEST 1 VW 12/2/2018 9:56 PM     FINDINGS:    Lungs:  The lungs are clear. There is no confluent infiltrate.    Pleural space: No pleural effusion. No pneumothorax.    Heart/Mediastinum:  The cardiac silhouette is stable in size.    Bones/joints: No visualized acute osseous abnormality.        Impression:       The lungs are clear. There is no confluent infiltrate.     THIS DOCUMENT HAS BEEN ELECTRONICALLY SIGNED BY GEORGI NAVA MD    CT Head Without Contrast Stroke Protocol [[id number]] Collected:  12/04/18 2126     Updated:  12/04/18 2212    Addenda:        THIS REPORT CONTAINS FINDINGS THAT MAY BE CRITICAL TO PATIENT CARE. The   findings were verbally communicated via telephone conference with CB LEE   at 10:06 PM EST on 12/4/2018. The findings were acknowledged and   understood.    THIS DOCUMENT HAS BEEN ELECTRONICALLY SIGNED BY GEORGI NAVA MD  Signed:  12/04/18 2212 by Georgi Nava MD    Narrative:       EXAM:    CT Head Without Intravenous Contrast     EXAM DATE/TIME:    12/4/2018 9:26 PM     CLINICAL HISTORY:    53 years old, female; Signs and symptoms; Weakness, extremity; Right;   Additional info: Stroke     TECHNIQUE:    Axial computed tomography images of the head/brain without intravenous   contrast.    All CT scans at this facility use at least one of these dose optimization   techniques: automated exposure control; mA and/or kV adjustment per patient   size (includes targeted exams where dose is matched to clinical indication); or   iterative reconstruction.     COMPARISON:    MRI  BRAIN WO CONTRAST 12/2/2018 10:57 PM     FINDINGS:    Brain:  Subtle increased density is identified within the left thalamus,   suggestive of calcification or hemorrhage. Hemorrhagic conversion of infarction   is within the differential. There is a hypodense subacute infarct in the left   periventricular region. This was acute on the prior MRI brain from 12/2/18.   There are scattered additional foci of hypodensity, likely representing small   vessel ischemic disease in a patient this age. The acuity of the white matter   disease is indeterminate. The white-gray differentiation is otherwise   preserved.    Midline shift:  There is no midline shift.    Ventricles:  There is mild prominence of the ventricles and sulci, compatible   with atrophy.    Bones/joints:  The calvarium demonstrates no evidence for a depressed   fracture.    Sinuses:  Mild mucosal thickening of scattered ethmoid air cells.    Mastoid air cells: No mastoid effusion.    Soft tissues: Normal.    Vasculature:  Hyperdensity is identified within the basilar artery, and   thrombus cannot be excluded. There is atherosclerotic calcification of the   intracranial internal carotid arteries.       Impression:       1. Subtle increased density is identified within the left thalamus, suggestive   of calcification or hemorrhage. Hemorrhagic conversion of infarction is within   the differential. Correlation with MRI is recommended.   2. There is a hypodense subacute infarct in the left periventricular region.   3. Hyperdensity is identified within the basilar artery, and thrombus cannot be   excluded. This can be further evaluated with CTA or MRA.   4. There are scattered additional foci of hypodensity, likely representing   small vessel ischemic disease in a patient this age.   5. Mild atrophy.     COMMENT:   [place] Stroke Program Early CT Score (ASPECTS) = 10     THIS DOCUMENT HAS BEEN ELECTRONICALLY SIGNED BY BROOKE KRISHNA MD          LABS:   Lab Results  (last 24 hours)     Procedure Component Value Units Date/Time    Magnesium [[id number]]  (Normal) Collected:  12/05/18 0449    Specimen:  Blood Updated:  12/05/18 0544     Magnesium 1.8 mg/dL     Phosphorus [[id number]]  (Normal) Collected:  12/05/18 0449    Specimen:  Blood Updated:  12/05/18 0544     Phosphorus 3.8 mg/dL     Basic Metabolic Panel [[id number]]  (Abnormal) Collected:  12/05/18 0449    Specimen:  Blood Updated:  12/05/18 0544     Glucose 107 mg/dL      BUN 13 mg/dL      Creatinine 0.78 mg/dL      Sodium 139 mmol/L      Potassium 3.2 mmol/L      Chloride 104 mmol/L      CO2 28.0 mmol/L      Calcium 9.2 mg/dL      eGFR  African Amer 94 mL/min/1.73      BUN/Creatinine Ratio 16.7     Anion Gap 7.0 mmol/L     Narrative:       [employer] Guidelines    Stage     Description        GFR  1         Normal or High     90+  2         Mild decrease      60-89  3         Moderate decrease  30-59  4         Severe decrease    15-29  5         Kidney failure     <15    The MDRD GFR formula is only valid for adults with stable renal function between ages 18 and 70.    CBC (No Diff) [[id number]]  (Normal) Collected:  12/05/18 0449    Specimen:  Blood Updated:  12/05/18 0512     WBC 9.86 10*3/mm3      RBC 4.41 10*6/mm3      Hemoglobin 12.8 g/dL      Hematocrit 38.1 %      MCV 86.4 fL      MCH 29.0 pg      MCHC 33.6 g/dL      RDW 13.3 %      RDW-SD 42.0 fl      MPV 10.4 fL      Platelets 333 10*3/mm3     Basic Metabolic Panel [[id number]]  (Abnormal) Collected:  12/04/18 2123    Specimen:  Blood Updated:  12/04/18 2331     Glucose 112 mg/dL      BUN 19 mg/dL      Creatinine 0.92 mg/dL      Sodium 138 mmol/L      Potassium 3.6 mmol/L      Chloride 104 mmol/L      CO2 28.0 mmol/L      Calcium 10.2 mg/dL      eGFR  African Amer 78 mL/min/1.73      BUN/Creatinine Ratio 20.7     Anion Gap 6.0 mmol/L     Narrative:       National Kid[employer] Guidelines    Stage     Description        GFR  1         Normal or  High     90+  2         Mild decrease      60-89  3         Moderate decrease  30-59  4         Severe decrease    15-29  5         Kidney failure     <15    The MDRD GFR formula is only valid for adults with stable renal function between ages 18 and 70.    [facility] Draw [[id number]] Collected:  12/04/18 2122    Specimen:  Blood Updated:  12/04/18 2300    Narrative:       The following orders were created for panel order [facility].  Procedure                               Abnormality         Status                     ---------                               -----------         ------                     Light Blue Top[[id number]]                                   Final result               Green Top (Gel)[[id number]]                                  Final result               Lavender Top[[id number]]                                     Final result               Gold Top - SST[[id number]]                                   Final result               Green Top (No Gel)[[id number]]                                                            Please view results for these tests on the individual orders.    Light Blue Top [[id number]] Collected:  12/04/18 2200    Specimen:  Blood Updated:  12/04/18 2300     Extra Tube hold for add-on     Comment: Auto resulted       Green Top (Gel) [[id number]] Collected:  12/04/18 2123    Specimen:  Blood Updated:  12/04/18 2231     Extra Tube Hold for add-ons.     Comment: Auto resulted.       Lavender Top [[id number]] Collected:  12/04/18 2123    Specimen:  Blood Updated:  12/04/18 2231     Extra Tube hold for add-on     Comment: Auto resulted       Gold Top - SST [[id number]] Collected:  12/04/18 2122    Specimen:  Blood Updated:  12/04/18 2231     Extra Tube Hold for add-ons.     Comment: Auto resulted.       aPTT [[id number]]  (Normal) Collected:  12/04/18 2200    Specimen:  Blood Updated:  12/04/18 2229     PTT 30.3 seconds     Narrative:       PTT = The equivalent PTT values for the  therapeutic range of heparin levels at 0.3 to 0.5 U/ml are 55 to 70 seconds.    AST [[id number]]  (Normal) Collected:  12/04/18 2123    Specimen:  Blood Updated:  12/04/18 2151     AST (SGOT) 24 U/L     ALT [[id number]]  (Normal) Collected:  12/04/18 2123    Specimen:  Blood Updated:  12/04/18 2151     ALT (SGPT) 24 U/L     POC Troponin, Rapid [[id number]]  (Normal) Collected:  12/04/18 2129    Specimen:  Blood Updated:  12/04/18 2145     Troponin I 0.00 ng/mL      Comment: Serial Number: [id number]:  [id number]       CBC & Differential [[id number]] Collected:  12/04/18 2123    Specimen:  Blood Updated:  12/04/18 2130    Narrative:       The following orders were created for panel order CBC & Differential.  Procedure                               Abnormality         Status                     ---------                               -----------         ------                     CBC Auto Differential[[id number]]        Normal              Final result                 Please view results for these tests on the individual orders.    CBC Auto Differential [[id number]]  (Normal) Collected:  12/04/18 2123    Specimen:  Blood Updated:  12/04/18 2130     WBC 9.95 10*3/mm3      RBC 4.74 10*6/mm3      Hemoglobin 14.0 g/dL      Hematocrit 40.9 %      MCV 86.3 fL      MCH 29.5 pg      MCHC 34.2 g/dL      RDW 13.3 %      RDW-SD 42.4 fl      MPV 10.6 fL      Platelets 380 10*3/mm3      Neutrophil % 62.1 %      Lymphocyte % 31.5 %      Monocyte % 4.9 %      Eosinophil % 1.3 %      Basophil % 0.2 %      Immature Grans % 0.1 %      Neutrophils, Absolute 6.18 10*3/mm3      Lymphocytes, Absolute 3.13 10*3/mm3      Monocytes, Absolute 0.49 10*3/mm3      Eosinophils, Absolute 0.13 10*3/mm3      Basophils, Absolute 0.02 10*3/mm3      Immature Grans, Absolute 0.01 10*3/mm3           MDM  The patient's symptoms of motor weakness have improved, she harbors a Heavy feeling in the right leg. She did walk with PT this am, her gait is improved. B/P  is stable. Diagnostic studies were reviewed with Amanda Hager and Peter, findings are consistent with a cavernous angioma. She most likely developed a hemorrhage into the stroke which will resolve in time. Treatment would be conservative.  Neurosurgical intervention not required at this time.   Dr. Green will f/u with further recommendations.      Kylee Cisneros PA-C  12/05/18  11:16 AM

## 2018-12-05 NOTE — THERAPY EVALUATION
Acute Care - Physical Therapy Initial Evaluation  [facility]     Patient Name: Shweta Gonsales  : 1965  MRN: [id number]  Today's Date: 2018   Onset of Illness/Injury or Date of Surgery: 18  Date of Referral to PT: 18  Referring Physician: ANDREW Teague      Admit Date: 2018    Visit Dx:     ICD-10-CM ICD-9-CM   1. Right-sided sensory deficit present R41.89 780.99   2. Subacute intracerebral hemorrhage (CMS/HCC) I61.9 431   3. Impaired mobility and ADLs Z74.09 799.89   4. 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    • Right-sided sensory deficit present     Past Medical History:   Diagnosis Date   • Diabetes mellitus (CMS/HCC)    • Hemorrhagic stroke (CMS/HCC)    • Hyperlipidemia    • Hypertension    • Ischemic cerebrovascular accident (CVA) (CMS/HCC) 2018   • RA (rheumatoid arthritis) (CMS/HCC)    • Stroke (CMS/HCC)      Past Surgical History:   Procedure Laterality Date   • CHOLECYSTECTOMY     • FRACTURE SURGERY     • HYSTERECTOMY          PT ASSESSMENT (last 12 hours)      Physical Therapy Evaluation     Row Name 18 1301          PT Evaluation Time/Intention    Subjective Information  no complaints  -KR     Document Type  evaluation  -KR     Mode of Treatment  physical therapy  -KR     Patient Effort  good  -KR     Symptoms Noted During/After Treatment  none  -KR     Row Name 18 1301          General Information    Patient Profile Reviewed?  yes  -KR     Onset of Illness/Injury or Date of Surgery  18  -KR      Referring Physician  ANDREW Teague  -KR     Patient Observations  alert;cooperative;agree to therapy  -KR     Prior Level of Function  independent:;all household mobility;gait;transfer;ADL's;dressing;bathing  -KR     Equipment Currently Used at Home  none  -KR     Pertinent History of Current Functional Problem  Pt presented to [facility] ED with c/o R sided numbness. Pt previously admitted from 12/2-12/4 with L parietal ischemic CVA with similar symptomatology. Upon arrival to ED pt noted to have mild sensory deficit on R. NIH is 1. CT head reveals increased density with L thalamus likely representative of hemorrhage.   -KR     Existing Precautions/Restrictions  fall  -KR     Risks Reviewed  patient and family:;LOB;dizziness;increased discomfort;change in vital signs  -KR     Benefits Reviewed  patient and family:;improve function;increase independence;increase strength;increase balance  -KR     Barriers to Rehab  none identified  -KR     Row Name 12/05/18 1309          Relationship/Environment    Lives With  child(alan), adult;spouse  -KR     Row Name 12/05/18 1309          Resource/Environmental Concerns    Current Living Arrangements  home/apartment/condo  -KR     Resource/Environmental Concerns  none  -KR     Transportation Concerns  car, none  -KR     Row Name 12/05/18 1309          Home Main Entrance    Number of Stairs, Main Entrance  three  -KR     Stair Railings, Main Entrance  none  -KR     Row Name 12/05/18 1309          Cognitive Assessment/Interventions    Additional Documentation  Cognitive Assessment/Intervention (Group)  -KR     Row Name 12/05/18 1309          Cognitive Assessment/Intervention- PT/OT    Affect/Mental Status (Cognitive)  flat/blunted affect  -KR     Orientation Status (Cognition)  oriented x 4  -KR     Follows Commands (Cognition)  follows one step commands;over 90% accuracy;verbal cues/prompting required  -KR     Cognitive Function (Cognitive)  safety deficit  -KR     Safety Deficit  (Cognitive)  mild deficit;ability to follow commands;at risk behavior observed;awareness of need for assistance;impulsivity;insight into deficits/self awareness;safety precautions awareness;safety precautions follow-through/compliance  -KR     Personal Safety Interventions  fall prevention program maintained;gait belt;nonskid shoes/slippers when out of bed  -KR     Row Name 12/05/18 1309          Safety Issues, Functional Mobility    Safety Issues Affecting Function (Mobility)  awareness of need for assistance;impulsivity;insight into deficits/self awareness;safety precaution awareness;safety precautions follow-through/compliance  -KR     Impairments Affecting Function (Mobility)  endurance/activity tolerance;strength  -KR     Row Name 12/05/18 1309          Bed Mobility Assessment/Treatment    Bed Mobility Assessment/Treatment  supine-sit;sit-supine  -KR     Supine-Sit [place] (Bed Mobility)  supervision  -KR     Sit-Supine [place] (Bed Mobility)  supervision  -KR     Assistive Device (Bed Mobility)  head of bed elevated  -KR     Comment (Bed Mobility)  Pt demonstrated appropriate technique and safety awareness with bed mobility.   -KR     Row Name 12/05/18 1309          Transfer Assessment/Treatment    Transfer Assessment/Treatment  sit-stand transfer;stand-sit transfer;toilet transfer  -KR     Comment (Transfers)  VC's for sequencing and safety awareness. Pt attempting to stand prior to instruction.   -KR     Sit-Stand [place] (Transfers)  supervision;verbal cues  -KR     Stand-Sit [place] (Transfers)  supervision;verbal cues  -KR     [place] Level (Toilet Transfer)  supervision  -KR     Assistive Device (Toilet Transfer)  commode  -KR     Row Name 12/05/18 1309          Toilet Transfer    Type (Toilet Transfer)  sit-stand;stand-sit  -KR     Row Name 12/05/18 1304          Gait/Stairs Assessment/Training    Gait/Stairs Assessment/Training  gait/ambulation independence  -KR      [place] Level (Gait)  contact guard;1 person assist;1 person to manage equipment;verbal cues progressed to supervision  -KR     Distance in Feet (Gait)  250  -KR     Pattern (Gait)  step-through  -KR     Deviations/Abnormal Patterns (Gait)  antalgic;right sided deviations;base of support, narrow;desirae decreased;other (see comments) decreased step length  -KR     Right Sided Gait Deviations  heel strike decreased;knee hyperextension  -KR     Comment (Gait/Stairs)  Pt demonstrated step through gait pattern with slow desirae and decreased step length; antalgic on the R. Pt demonstrated increased R knee flexion during swing phase of gait with brief moment of R knee hyperextension when the foot was planted during stance phase of gait. Pt c/o R LE heaviness when advancing LE. Pt demonstrated good stability during ambulation with no LOB.   -     Row Name 12/05/18 1309          General ROM    GENERAL ROM COMMENTS  BLE WFL   -     Row Name 12/05/18 1309          MMT (Manual Muscle Testing)    General MMT Comments  RLE grossly 4/5; LLE grossly 5/5  -     Row Name 12/05/18 1309          Motor Assessment/Intervention    Additional Documentation  Balance (Group)  -     Row Name 12/05/18 1309          Balance    Balance  static sitting balance;static standing balance;dynamic standing balance  -     Row Name 12/05/18 1309          Static Sitting Balance    Level of [place] (Unsupported Sitting, Static Balance)  independent  -KR     Sitting Position (Unsupported Sitting, Static Balance)  sitting on edge of bed  -     Row Name 12/05/18 1309          Static Standing Balance    Level of [place] (Supported Standing, Static Balance)  supervision  -     Row Name 12/05/18 1309          Dynamic Standing Balance    Level of [place], Reaches Outside Midline (Standing, Dynamic Balance)  contact guard assist progressed to supervision  -     Row Name 12/05/18 1309          Sensory Assessment/Intervention     Sensory General Assessment  no sensation deficits identified  -KR     Row Name 12/05/18 1309          Pain Assessment    Additional Documentation  Pain Scale: Numbers Pre/Post-Treatment (Group)  -KR     Row Name 12/05/18 1309          Pain Scale: Numbers Pre/Post-Treatment    Pain Scale: Numbers, Pretreatment  0/10 - no pain  -KR     Pain Scale: Numbers, Post-Treatment  0/10 - no pain  -KR     Row Name 12/05/18 1309          Physical Therapy Clinical Impression    Date of Referral to PT  12/04/18  -KR     PT Diagnosis (PT Clinical Impression)  impaired functional mobility  -KR     Patient/Family Goals Statement (PT Clinical Impression)  return to PLOF  -KR     Criteria for Skilled Interventions Met (PT Clinical Impression)  yes;treatment indicated  -KR     Rehab Potential (PT Clinical Summary)  good, to achieve stated therapy goals  -KR     Care Plan Review (PT)  evaluation/treatment results reviewed;risks/benefits reviewed;patient/other agree to care plan  -KR     Care Plan Review, Other Participant (PT Clinical Impression)  family  -KR     Row Name 12/05/18 1309          Vital Signs    Pre Systolic BP Rehab  121  -KR     Pre Treatment Diastolic BP  73  -KR     Post Systolic BP Rehab  132  -KR     Post Treatment Diastolic BP  76  -KR     Pretreatment Heart Rate (beats/min)  90  -KR     Posttreatment Heart Rate (beats/min)  88  -KR     Pre SpO2 (%)  97  -KR     O2 Delivery Pre Treatment  room air  -KR     Post SpO2 (%)  98  -KR     O2 Delivery Post Treatment  room air  -KR     Pre Patient Position  Supine  -KR     Intra Patient Position  Standing  -KR     Post Patient Position  Supine  -KR     Row Name 12/05/18 1309          Physical Therapy Goals    Bed Mobility Goal Selection (PT)  bed mobility, PT goal 1  -KR     Transfer Goal Selection (PT)  transfer, PT goal 1  -KR     Gait Training Goal Selection (PT)  gait training, PT goal 1  -KR     Stairs Goal Selection (PT)  stairs, PT goal 1  -KR     Row Name 12/05/18  1309          Bed Mobility Goal 1 (PT)    Activity/Assistive Device (Bed Mobility Goal 1, PT)  bed mobility activities, all  -KR     Caret Level/Cues Needed (Bed Mobility Goal 1, PT)  independent  -KR     Time Frame (Bed Mobility Goal 1, PT)  2 weeks  -KR     Progress/Outcomes (Bed Mobility Goal 1, PT)  goal ongoing  -KR     Row Name 12/05/18 1309          Transfer Goal 1 (PT)    Activity/Assistive Device (Transfer Goal 1, PT)  sit-to-stand/stand-to-sit;bed-to-chair/chair-to-bed  -KR     Caret Level/Cues Needed (Transfer Goal 1, PT)  independent  -KR     Time Frame (Transfer Goal 1, PT)  2 weeks  -KR     Progress/Outcome (Transfer Goal 1, PT)  goal ongoing  -KR     Row Name 12/05/18 1309          Gait Training Goal 1 (PT)    Activity/Assistive Device (Gait Training Goal 1, PT)  gait (walking locomotion)  -KR     Caret Level (Gait Training Goal 1, PT)  independent  -KR     Distance (Gait Goal 1, PT)  400 feet  -KR     Time Frame (Gait Training Goal 1, PT)  2 weeks  -KR     Progress/Outcome (Gait Training Goal 1, PT)  goal ongoing  -KR     Row Name 12/05/18 1309          Stairs Goal 1 (PT)    Activity/Assistive Device (Stairs Goal 1, PT)  ascending stairs;descending stairs  -KR     Caret Level/Cues Needed (Stairs Goal 1, PT)  independent  -KR     Number of Stairs (Stairs Goal 1, PT)  3  -KR     Time Frame (Stairs Goal 1, PT)  2 weeks  -KR     Progress/Outcome (Stairs Goal 1, PT)  goal ongoing  -KR     Row Name 12/05/18 1309          Positioning and Restraints    Pre-Treatment Position  in bed  -KR     Post Treatment Position  bed  -KR     In Bed  notified nsg;supine;call light within reach;encouraged to call for assist;with family/caregiver;side rails up x3;SCD pump applied  -KR     Row Name 12/05/18 1309          Living Environment    Home Accessibility  stairs to enter home  -KR       User Key  (r) = Recorded By, (t) = Taken By, (c) = Cosigned By    Initials Name Provider Type    KR Ring,  Xin VÁSQUEZ PT Physical Therapist        Physical Therapy Education     Title: PT OT SLP Therapies (In Progress)     Topic: Physical Therapy (In Progress)     Point: Mobility training (In Progress)     Learning Progress Summary           Patient Acceptance, E, NR by NI at 12/5/2018  1:09 PM   Family Acceptance, E, NR by KR at 12/5/2018  1:09 PM                   Point: Body mechanics (In Progress)     Learning Progress Summary           Patient Acceptance, E, NR by KR at 12/5/2018  1:09 PM   Family Acceptance, E, NR by KR at 12/5/2018  1:09 PM                   Point: Precautions (In Progress)     Learning Progress Summary           Patient Acceptance, E, NR by KR at 12/5/2018  1:09 PM   Family Acceptance, E, NR by KR at 12/5/2018  1:09 PM                               User Key     Initials Effective Dates Name Provider Type Discipline    KR 04/03/18 -  Xin Connolly, PT Physical Therapist PT              PT Recommendation and Plan  Anticipated Discharge Disposition (PT): home with assist, home with OP services  Planned Therapy Interventions (PT Eval): balance training, bed mobility training, gait training, stair training, strengthening, transfer training  Therapy Frequency (PT Clinical Impression): daily  Outcome Summary/Treatment Plan (PT)  Anticipated Discharge Disposition (PT): home with assist, home with OP services  Plan of Care Reviewed With: patient  Outcome Summary: PT initial evaluation completed for pt presenting with R sided numbness and weakness and decreased functional mobility. Pt ambulated 250ft with CGA 1+1, progressing to supervision. Pt's decreased independence warrants PT skilled care. Recommend D/C home with assistance and OP PT services.   Outcome Measures     Row Name 12/05/18 1309 12/05/18 0840 12/03/18 1712       How much help from another person do you currently need...    Turning from your back to your side while in flat bed without using bedrails?  4  -KR  --  4  -DM    Moving from lying  on back to sitting on the side of a flat bed without bedrails?  3  -KR  --  4  -DM    Moving to and from a bed to a chair (including a wheelchair)?  3  -KR  --  4  -DM    Standing up from a chair using your arms (e.g., wheelchair, bedside chair)?  3  -KR  --  4  -DM    Climbing 3-5 steps with a railing?  3  -KR  --  3  -DM    To walk in hospital room?  3  -KR  --  4  -DM    AM-PAC 6 Clicks Score  19  -KR  --  23  -DM       How much help from another is currently needed...    Putting on and taking off regular lower body clothing?  --  3  -CL  --    Bathing (including washing, rinsing, and drying)  --  2  -CL  --    Toileting (which includes using toilet bed pan or urinal)  --  3  -CL  --    Putting on and taking off regular upper body clothing  --  3  -CL  --    Taking care of personal grooming (such as brushing teeth)  --  3  -CL  --    Eating meals  --  4  -CL  --    Score  --  18  -CL  --       Modified [place] Scale    Modified [place] Scale  2 - Slight disability.  Unable to carry out all previous activities but able to look after own affairs without assistance.  -KR  2 - Slight disability.  Unable to carry out all previous activities but able to look after own affairs without assistance.  -CL  1 - No significant disability despite symptoms.  Able to carry out all usual duties and activities.  -DM       Functional Assessment    Outcome Measure Options  AM-PAC 6 Clicks Basic Mobility (PT);Modified [place]  -KR  AM-PAC 6 Clicks Daily Activity (OT)  -CL  AM-PAC 6 Clicks Basic Mobility (PT)  -DM    Row Name 12/03/18 0800 12/03/18 0740          How much help from another is currently needed...    Putting on and taking off regular lower body clothing?  --  4  -AC     Bathing (including washing, rinsing, and drying)  --  4  -AC     Toileting (which includes using toilet bed pan or urinal)  --  4  -AC     Putting on and taking off regular upper body clothing  --  4  -AC     Taking care of personal grooming (such as  brushing teeth)  --  4  -AC     Eating meals  --  4  -AC     Score  --  24  -AC        Modified [place] Scale    Modified [place] Scale  --  1 - No significant disability despite symptoms.  Able to carry out all usual duties and activities.  -AC        Functional Assessment    Outcome Measure Options  Modified [place]  -AC  AM-PAC 6 Clicks Daily Activity (OT)  -AC       User Key  (r) = Recorded By, (t) = Taken By, (c) = Cosigned By    Initials Name Provider Type    AC Radha Woods, OT Occupational Therapist    Birgit Sam, PT Physical Therapist    CL Eneida Alston, OT Occupational Therapist    Xin Oliveira, PT Physical Therapist         Time Calculation:   PT Charges     Row Name 12/05/18 1309             Time Calculation    Start Time  1309  -KR      PT Received On  12/05/18  -KR      PT Goal Re-Cert Due Date  12/15/18  -KR        User Key  (r) = Recorded By, (t) = Taken By, (c) = Cosigned By    Initials Name Provider Type    Xin Oliveira, PT Physical Therapist        Therapy Suggested Charges     Code   Minutes Charges    None           Therapy Charges for Today     Code Description Service Date Service Provider Modifiers Qty    44042473633 HC PT EVAL LOW COMPLEXITY 4 12/5/2018 Xin Connolly, PT GP 1    85236098263 HC PT THER SUPP EA 15 MIN 12/5/2018 Xin Connolly, PT GP 2          PT G-Codes  Outcome Measure Options: AM-PAC 6 Clicks Basic Mobility (PT), Modified [place]  AM-PAC 6 Clicks Score: 19  Score: 18  Modified [place] Scale: 2 - Slight disability.  Unable to carry out all previous activities but able to look after own affairs without assistance.      Erika Connolly PT  12/5/2018

## 2018-12-05 NOTE — PLAN OF CARE
Problem: Patient Care Overview  Goal: Plan of Care Review  Outcome: Ongoing (interventions implemented as appropriate)   12/05/18 1340   Coping/Psychosocial   Plan of Care Reviewed With patient;family   SLP cognitive-communication evaluation completed. Will sign-off on cognitive-communication as pt has returned to baseline w/ no acute needs at this time. Please see note for further details and recommendations.      Problem: Stroke (Hemorrhagic) (Adult)  Goal: Signs and Symptoms of Listed Potential Problems Will be Absent, Minimized or Managed (Stroke)  Outcome: Ongoing (interventions implemented as appropriate)   12/05/18 1100   Goal/Outcome Evaluation   Problems Assessed (Stroke (Hemorrhagic)) cognitive impairment;communication impairment   Problems Present (Stroke (Hemorrhagic)) none

## 2018-12-05 NOTE — THERAPY EVALUATION
Acute Care - Occupational Therapy Initial Evaluation  [facility]     Patient Name: Shweta Gonsales  : 1965  MRN: [id number]  Today's Date: 2018  Onset of Illness/Injury or Date of Surgery: 18  Date of Referral to OT: 18  Referring Physician: ANDREW Teague    Admit Date: 2018       ICD-10-CM ICD-9-CM   1. Right-sided sensory deficit present R41.89 780.99   2. Subacute intracerebral hemorrhage (CMS/HCC) I61.9 431   3. 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    • Right-sided sensory deficit present     Past Medical History:   Diagnosis Date   • Diabetes mellitus (CMS/HCC)    • Hemorrhagic stroke (CMS/HCC)    • Hyperlipidemia    • Hypertension    • Ischemic cerebrovascular accident (CVA) (CMS/HCC) 2018   • RA (rheumatoid arthritis) (CMS/HCC)    • Stroke (CMS/HCC)      Past Surgical History:   Procedure Laterality Date   • CHOLECYSTECTOMY     • FRACTURE SURGERY     • HYSTERECTOMY            OT ASSESSMENT FLOWSHEET (last 72 hours)      Occupational Therapy Evaluation     Row Name 18 0840 18 0740                OT Evaluation Time/Intention    Subjective Information  complains of;pain  -CL  complains of heaviness in RLE, decreased sensation in RUE  -AC       Document Type  evaluation  -CL  discharge evaluation/summary  -AC       Mode of Treatment  occupational therapy  -CL  occupational therapy  -AC       Patient Effort  good  -CL  excellent  -AC       Symptoms Noted During/After Treatment  fatigue  -CL  none  -AC       Comment  Pt declined  functional mobility d/t c/o fatigue and RUE pain. Noted swelling around IV site upon arrival, RN notified and disconnected IV.   -CL  --          General Information    Patient Profile Reviewed?  yes  -CL  yes  -AC       Onset of Illness/Injury or Date of Surgery  12/04/18  -CL  12/02/18  -AC       Referring Physician  ANDREW Teague  -CL  CALDERON Swartz-C  -AC       Patient Observations  --  alert;cooperative;agree to therapy  -AC       Patient/Family Observations  --  No family present  -AC       General Observations of Patient  --  Pt received sitting up in bed  -AC       Prior Level of Function  independent:;all household mobility;transfer;ADL's;dressing;bathing  -CL  independent:;all household mobility;ADL's  -AC       Equipment Currently Used at Home  walker, rolling;shower chair Not using PTA  -CL  walker, rolling;shower chair used just after CVA 10/2018, was not using just prior admit  -AC       Pertinent History of Current Functional Problem  Pt presented to the ED 22/ to c/o R sided numbness. Pt recently admitted 12/2-12/4 w/ dx of L parietal ischemic CVA. CT (+) increased density w/in L thalamus consistent w/ hemorrhage. PMH: CVA 10/2018 w/ residual R sided deficits.   -CL  Pt admit with R sided heaviness, weakness with global onset of headache.  MRI revealed a new acute ischemic stroke in the left parietal periventricular matter.  Pt with CVA 10/2018 with residual R sided heaviness  -AC       Existing Precautions/Restrictions  fall;other (see comments) R sided weakness/numbness  -CL  -- standard, monitor BP  -AC       Risks Reviewed  patient:;LOB;nausea/vomiting;dizziness;increased discomfort;change in vital signs;lines disloged  -CL  patient:;change in vital signs;LOB  -AC       Benefits Reviewed  patient:;improve function;increase independence;increase strength;increase balance;decrease pain;increase knowledge  -CL  patient:;improve function;increase knowledge  -AC       Barriers to Rehab  medically complex   "-CL  -- previous CVA with residual R sided \"heaviness\"  -AC          Relationship/Environment    Primary Source of Support/Comfort  --  spouse;child(alan) 18yo dtr  -AC       Lives With  child(alan), adult;spouse  -CL  spouse;child(alan), adult  -AC          Resource/Environmental Concerns    Current Living Arrangements  home/apartment/condo  -CL  home/apartment/condo  -AC          Home Main Entrance    Number of Stairs, Main Entrance  three  -CL  three  -AC       Stair Railings, Main Entrance  --  none  -AC          Cognitive Assessment/Interventions    Additional Documentation  Cognitive Assessment/Intervention (Group)  -CL  --          Cognitive Assessment/Intervention- PT/OT    Affect/Mental Status (Cognitive)  flat/blunted affect  -CL  --       Orientation Status (Cognition)  oriented x 4  -CL  oriented x 4  -AC       Follows Commands (Cognition)  over 90% accuracy;verbal cues/prompting required;repetition of directions required  -CL  WNL  -AC       Cognitive Function (Cognitive)  safety deficit  -CL  --       Safety Deficit (Cognitive)  mild deficit;awareness of need for assistance;safety precautions awareness  -CL  --       Personal Safety Interventions  fall prevention program maintained;gait belt;nonskid shoes/slippers when out of bed  -CL  --          Bed Mobility Assessment/Treatment    Bed Mobility Assessment/Treatment  supine-sit;sit-supine  -CL  supine-sit;sit-supine  -AC       [place] Level (Bed Mobility)  supervision  -CL  --       Supine-Sit [place] (Bed Mobility)  supervision  -CL  conditional independence  -AC       Assistive Device (Bed Mobility)  head of bed elevated;bed rails  -CL  bed rails  -AC          Functional Mobility    Functional Mobility- Ind. Level  --  independent  -AC       Functional Mobility-Distance (Feet)  --  20  -AC       Functional Mobility- Comment  Pt declined.   -CL  --          Transfer Assessment/Treatment    Transfer Assessment/Treatment  sit-stand " transfer;stand-sit transfer  -CL  sit-stand transfer;stand-sit transfer  -AC       Comment (Transfers)  Pt performed mini-stand to bring hips towards HOB. Declined further activity.   -CL  --          Sit-Stand Transfer    Sit-Stand [place] (Transfers)  supervision;verbal cues  -CL  independent  -AC          Stand-Sit Transfer    Stand-Sit [place] (Transfers)  supervision;verbal cues  -CL  independent  -AC          ADL Assessment/Intervention    66287 - OT Self Care/Mgmt Minutes  --  --  -AC       BADL Assessment/Intervention  --  lower body dressing  -AC          Lower Body Dressing Assessment/Training    Lower Body Dressing [place] Level  --  doff;don;socks;independent  -AC       Lower Body Dressing Position  --  long sitting  -AC          General ROM    GENERAL ROM COMMENTS  BUE grossly WFL.   -CL  BUE WFL  -AC          MMT (Manual Muscle Testing)    General MMT Comments  LUE grossly 4/5. RUE shldr FE 3+/5, bicep/tricep 4/5,  4-/5.   -CL  BUE grossly 4+/5 with R  slightly weaker than L  -AC          Motor Assessment/Interventions    Additional Documentation  Balance (Group)  -CL  Balance (Group);Balance Interventions (Group);Gross Motor Coordination (Group);Fine Motor Testing & Training (Group);Therapeutic Exercise (Group);Therapeutic Exercise Interventions (Group)  -AC          Therapeutic Exercise    01139 - OT Therapeutic Exercise Minutes  --  3  -AC       50108 - OT Therapeutic Activity Minutes  8  -CL  --          Therapeutic Exercise    Comment (Therapeutic Exercise)  --  Issued R sponge block HEP and pt gave return demo  -AC          Gross Motor Coordination    Gross Motor Impairments  finger to nose  -CL  coordination;finger to nose;rapid alternating intact  -AC       Gross Motor Skill, Impairments Detail  BUE grossly WFL.   -CL  --          Balance    Balance  static sitting balance;dynamic sitting balance  -CL  static sitting balance;static standing balance;dynamic sitting  balance;dynamic standing balance  -AC          Static Sitting Balance    Level of [place] (Unsupported Sitting, Static Balance)  independent  -CL  independent  -AC       Sitting Position (Unsupported Sitting, Static Balance)  sitting on edge of bed  -CL  --       Time Able to Maintain Position (Unsupported Sitting, Static Balance)  more than 5 minutes  -CL  --          Dynamic Sitting Balance    Level of [place], Reaches Outside Midline (Sitting, Dynamic Balance)  independent  -CL  independent  -AC       Sitting Position, Reaches Outside Midline (Sitting, Dynamic Balance)  sitting on edge of bed  -CL  --          Static Standing Balance    Level of [place] (Supported Standing, Static Balance)  --  independent  -AC          Fine Motor Testing & Training    Comment, Fine Motor Coordination  --  -- intact in hand manipulation and opposition  -AC          Sensory Assessment/Intervention    Sensory General Assessment  light touch sensation deficits identified  -CL  -- light touch intact  -AC       Additional Documentation  Vision Assessment/Intervention (Group)  -CL  --          Light Touch Sensation Assessment    Left Upper Extremity: Light Touch Sensation Assessment  intact  -CL  --       Right Upper Extremity: Light Touch Sensation Assessment  mild impairment, 75% or more correct responses  -CL  --          Vision Assessment/Intervention    Visual Impairment/Limitations  WFL  -CL  --          Positioning and Restraints    Pre-Treatment Position  in bed  -CL  in bed  -AC       Post Treatment Position  bed  -CL  bed  -AC       In Bed  notified nsg;fowlers;encouraged to call for assist;call light within reach;SCD pump applied;legs elevated  -CL  fowlers;call light within reach;encouraged to call for assist;exit alarm on  -AC          Pain Assessment    Additional Documentation  Pain Scale 2: FACES Pre/Post-Treatment (Group)  -CL  Pain Scale: Numbers Pre/Post-Treatment (Group)  -AC          Pain Scale:  "Numbers Pre/Post-Treatment    Pain Scale: Numbers, Pretreatment  --  0/10 - no pain  -AC       Pain Scale: Numbers, Post-Treatment  --  0/10 - no pain  -AC          Pain Scale 2: FACES Pre/Post-Treatment    Pain 2: FACES Scale, Pretreatment  2-->hurts little bit  -CL  --       Pain 2: FACES Scale, Post-Treatment  2-->hurts little bit  -CL  --       Pain Location 2 - Side  Right  -CL  --       Pain Location 2 - Orientation  upper  -CL  --       Pain Location 2  extremity IV site  -CL  --       Pre/Post Treatment Pain 2 Comment  Tolerated.   -CL  --       Pain Intervention(s) 2  Repositioned;Ambulation/increased activity  -CL  --          Plan of Care Review    Plan of Care Reviewed With  --  patient  -AC          Clinical Impression (OT)    Date of Referral to OT  12/05/18  -CL  12/03/18  -AC       OT Diagnosis  Decreased independence in ADLs.   -CL  pt at baseline with self care; eval only  -AC       Functional Level at Time of Evaluation (OT Eval)  --  demo ability to complete ADLs independently  -AC       Patient/Family Goals Statement (OT Eval)  Return home.   -CL  Return home; decrease LE feeling of \"heaviness\"  -AC       Criteria for Skilled Therapeutic Interventions Met (OT Eval)  yes;treatment indicated  -CL  no;no problems identified which require skilled intervention  -AC       Rehab Potential (OT Eval)  good, to achieve stated therapy goals  -CL  --       Therapy Frequency (OT Eval)  daily  -CL  evaluation only  -AC       Care Plan Review (OT)  --  evaluation/treatment results reviewed  -AC       Anticipated Equipment Needs at Discharge (OT)  -- TBA further  -CL  --       Anticipated Discharge Disposition (OT)  home with assist;home with OP services  -CL  home  -AC          Vital Signs    Pre Systolic BP Rehab  136  -CL  147  -AC       Pre Treatment Diastolic BP  75  -CL  94  -AC       Post Systolic BP Rehab  136  -CL  138  -AC       Post Treatment Diastolic BP  73  -CL  37  -AC       Pretreatment Heart " Rate (beats/min)  90  -CL  60  -AC       Intratreatment Heart Rate (beats/min)  --  67  -AC       Posttreatment Heart Rate (beats/min)  93  -CL  62  -AC       Pre SpO2 (%)  96  -CL  97  -AC       O2 Delivery Pre Treatment  room air  -CL  room air  -AC       Post SpO2 (%)  99  -CL  98  -AC       O2 Delivery Post Treatment  room air  -CL  room air  -AC       Pre Patient Position  Supine  -CL  Supine  -AC       Intra Patient Position  Standing  -CL  Standing  -AC       Post Patient Position  Supine  -CL  Supine  -AC          OT Goals    Transfer Goal Selection (OT)  transfer, OT goal 1  -CL  --       Dressing Goal Selection (OT)  dressing, OT goal 1  -CL  --       Strength Goal Selection (OT)  strength, OT goal 1  -CL  --       Additional Documentation  Strength Goal Selection (OT) (Row)  -CL  --          Transfer Goal 1 (OT)    Activity/Assistive Device (Transfer Goal 1, OT)  sit-to-stand/stand-to-sit;toilet  -CL  --       [place] Level/Cues Needed (Transfer Goal 1, OT)  independent;verbal cues required  -CL  --       Time Frame (Transfer Goal 1, OT)  by discharge;long term goal (LTG)  -CL  --       Progress/Outcome (Transfer Goal 1, OT)  goal ongoing  -CL  --          Dressing Goal 1 (OT)    Activity/Assistive Device (Dressing Goal 1, OT)  lower body dressing Don/doff socks w/ AAD  -CL  --       [place]/Cues Needed (Dressing Goal 1, OT)  standby assist;verbal cues required  -CL  --       Time Frame (Dressing Goal 1, OT)  by discharge;long term goal (LTG)  -CL  --       Progress/Outcome (Dressing Goal 1, OT)  goal ongoing  -CL  --          Strength Goal 1 (OT)    Strength Goal 1 (OT)  Pt will tolerate RUE HEP w/ red thera-band x10 reps to promote ADL performance.   -CL  --       Time Frame (Strength Goal 1, OT)  by discharge;long term goal (LTG)  -CL  --       Progress/Outcome (Strength Goal 1, OT)  goal ongoing  -CL  --          Discharge Summary (Occupational Therapy)    Additional Documentation  --   Discharge Summary, OT Eval (Group)  -AC          Discharge Summary, OT Eval    Reason for Discharge (OT Discharge Summary)  --  no further needs identified  -AC          Living Environment    Home Accessibility  stairs to enter home walk-in shower  -CL  stairs to enter home walk in shower  -AC         User Key  (r) = Recorded By, (t) = Taken By, (c) = Cosigned By    Initials Name Effective Dates    AC Radha Woods, OT 06/23/15 -     CL Eneida Alston OT 04/03/18 -          Occupational Therapy Education     Title: PT OT SLP Therapies (In Progress)     Topic: Occupational Therapy (In Progress)     Point: ADL training (In Progress)     Description: Instruct learner(s) on proper safety adaptation and remediation techniques during self care or transfers.   Instruct in proper use of assistive devices.    Learning Progress Summary           Patient Acceptance, E, NR by CL at 12/5/2018  9:51 AM    Comment:  Pt educated on appropriate safety precautions, t/f techniques, role of OT, and benefits of therapy.                   Point: Precautions (In Progress)     Description: Instruct learner(s) on prescribed precautions during self-care and functional transfers.    Learning Progress Summary           Patient Acceptance, E, NR by CL at 12/5/2018  9:51 AM    Comment:  Pt educated on appropriate safety precautions, t/f techniques, role of OT, and benefits of therapy.                   Point: Body mechanics (In Progress)     Description: Instruct learner(s) on proper positioning and spine alignment during self-care, functional mobility activities and/or exercises.    Learning Progress Summary           Patient Acceptance, E, NR by CL at 12/5/2018  9:51 AM    Comment:  Pt educated on appropriate safety precautions, t/f techniques, role of OT, and benefits of therapy.                               User Key     Initials Effective Dates Name Provider Type Discipline    CL 04/03/18 -  Eneida Alston, OT Occupational Therapist OT                   OT Recommendation and Plan  Outcome Summary/Treatment Plan (OT)  Anticipated Equipment Needs at Discharge (OT): (TBA further)  Anticipated Discharge Disposition (OT): home with assist, home with OP services  Therapy Frequency (OT Eval): daily  Plan of Care Review  Plan of Care Reviewed With: patient, daughter  Plan of Care Reviewed With: patient, daughter  Outcome Summary: OT completed a brief chart review relating to presenting physical deficits. Pt Supervision for bed mobility and t/fs, though session limited as pt declined further activity d/t RUE pain at IV site. Recommend cont skilled IPOT POC. Recommend pt DC home w/ assist and OP rehab, however will monitor progress closely.     Outcome Measures     Row Name 12/05/18 0840 12/03/18 1712 12/03/18 0800       How much difficulty does the patient currently have...    Turning from your back to your side while in flat bed without using bedrails?  --  4  -DM  --    Standing up from a chair using your arms (e.g., wheelchair, bedside chair)?  --  4  -DM  --    Moving from lying on back to sitting on the side of a flat bed without bedrails?  --  4  -DM  --       How much help from another person do you currently need...    Moving to and from a bed to a chair (including a wheelchair)?  --  4  -DM  --    Climbing 3-5 steps with a railing?  --  3  -DM  --    To walk in hospital room?  --  4  -DM  --    AM-PAC 6 Clicks Score  --  23  -DM  --       How much help from another is currently needed...    Putting on and taking off regular lower body clothing?  3  -CL  --  --    Bathing (including washing, rinsing, and drying)  2  -CL  --  --    Toileting (which includes using toilet bed pan or urinal)  3  -CL  --  --    Putting on and taking off regular upper body clothing  3  -CL  --  --    Taking care of personal grooming (such as brushing teeth)  3  -CL  --  --    Eating meals  4  -CL  --  --    Score  18  -CL  --  --       Modified Steger Scale    Modified Steger  Scale  2 - Slight disability.  Unable to carry out all previous activities but able to look after own affairs without assistance.  -CL  1 - No significant disability despite symptoms.  Able to carry out all usual duties and activities.  -DM  --       Functional Assessment    Outcome Measure Options  AM-PAC 6 Clicks Daily Activity (OT)  -CL  AM-PAC 6 Clicks Basic Mobility (PT)  -DM  Modified Theresa  -AC    Row Name 12/03/18 0740             How much help from another is currently needed...    Putting on and taking off regular lower body clothing?  4  -AC      Bathing (including washing, rinsing, and drying)  4  -AC      Toileting (which includes using toilet bed pan or urinal)  4  -AC      Putting on and taking off regular upper body clothing  4  -AC      Taking care of personal grooming (such as brushing teeth)  4  -AC      Eating meals  4  -AC      Score  24  -AC         Modified [place] Scale    Modified [place] Scale  1 - No significant disability despite symptoms.  Able to carry out all usual duties and activities.  -AC         Functional Assessment    Outcome Measure Options  AM-PAC 6 Clicks Daily Activity (OT)  -AC        User Key  (r) = Recorded By, (t) = Taken By, (c) = Cosigned By    Initials Name Provider Type    AC Radha Woods, OT Occupational Therapist    DM Birgit Burrell, PT Physical Therapist    Eneida Tapia, [place] Occupational Therapist          Time Calculation:   Time Calculation- OT     Row Name 12/05/18 0840             Time Calculation- OT    OT Start Time  0840  -CL      OT Received On  12/05/18  -CL      OT Goal Re-Cert Due Date  12/15/18  -CL         Timed Charges    89611 - OT Therapeutic Activity Minutes  8  -CL        User Key  (r) = Recorded By, (t) = Taken By, (c) = Cosigned By    Initials Name Provider Type    CL Eneida Alston, [place] Occupational Therapist        Therapy Suggested Charges     Code   Minutes Charges    12798 (CPT®) Hc Ot Neuromusc Re Education Ea 15 Min      41738 (CPT®)  Hc Ot Ther Proc Ea 15 Min      20772 (CPT®) Hc Ot Therapeutic Act Ea 15 Min 8 1    04641 (CPT®) Hc Ot Manual Therapy Ea 15 Min      66247 (CPT®) Hc Ot Iontophoresis Ea 15 Min      38111 (CPT®) Hc Ot Elec Stim Ea-Per 15 Min      56784 (CPT®) Hc Ot Ultrasound Ea 15 Min      98017 (CPT®) Hc Ot Self Care/Mgmt/Train Ea 15 Min      Total  8 1        Therapy Charges for Today     Code Description Service Date Service Provider Modifiers Qty    93407425380 HC OT THERAPEUTIC ACT EA 15 MIN 12/5/2018 Eneida Alston, OT GO 1    41085158400 HC OT EVAL LOW COMPLEXITY 3 12/5/2018 Eneida Alston OT GO 1               Eneida Alston OT  12/5/2018

## 2018-12-05 NOTE — PLAN OF CARE
Problem: Patient Care Overview  Goal: Plan of Care Review   12/05/18 1851   Coping/Psychosocial   Plan of Care Reviewed With patient;spouse;family   Plan of Care Review   Progress improving   OTHER   Outcome Summary Patient NIH 1 this am due to decreased sensation/heaviness in right sided extremities and face. Otherwise neurologically intact. VSS. Cardene off this afternoon. Will continue to monitor.        Problem: Stroke (Hemorrhagic) (Adult)  Goal: Signs and Symptoms of Listed Potential Problems Will be Absent, Minimized or Managed (Stroke)   12/05/18 1851   Goal/Outcome Evaluation   Problems Assessed (Stroke (Hemorrhagic)) all   Problems Present (Stroke (Hemorrhagic)) motor/sensory impairment

## 2018-12-05 NOTE — ED PROVIDER NOTES
Subjective   Ms. Shweta Gonsales is a 53 y.o. female who presents to the ED with c/o neurological problem. She was seen here on 12/2/18 and diagnosed with CVA by MRI brain. She was admitted at that time and discharged today at 1430 and prescribed 81 mg Aspirin and had her dosage of Lipitor doubled. She states she was without deficit until 1800 this evening when her right sided numbness returned. She reports this is a similar sensation she felt prior to her CVA 2 days ago. There are no other acute symptoms at this time.        History provided by:  Patient  Neurologic Problem   The patient's primary symptoms include focal sensory loss. This is a recurrent problem. The current episode started today. The neurological problem developed suddenly. The last time the patient was known to be well was 12/4/2018 6:00 PM.  The problem is unchanged. There was right-sided focality noted. Her past medical history is significant for a CVA.       Review of Systems   Neurological: Positive for numbness.   All other systems reviewed and are negative.      Past Medical History:   Diagnosis Date   • Diabetes mellitus (CMS/HCC)    • Hemorrhagic stroke (CMS/HCC)    • Hyperlipidemia    • Hypertension    • RA (rheumatoid arthritis) (CMS/HCC)    • Stroke (CMS/HCC)        No Known Allergies    Past Surgical History:   Procedure Laterality Date   • CHOLECYSTECTOMY     • FRACTURE SURGERY     • HYSTERECTOMY         History reviewed. No pertinent family history.    Social History     Socioeconomic History   • Marital status:      Spouse name: Not on file   • Number of children: Not on file   • Years of education: Not on file   • Highest education level: Not on file   Tobacco Use   • Smoking status: Never Smoker   Substance and Sexual Activity   • Alcohol use: No     Frequency: Never   • Drug use: No         Objective   Physical Exam   Constitutional: She is oriented to person, place, and time. She appears well-developed and well-nourished.  "No distress.   HENT:   Head: Normocephalic and atraumatic.   Nose: Nose normal.   Eyes: Conjunctivae are normal. No scleral icterus.   Neck: Normal range of motion. Neck supple.   Cardiovascular: Normal rate, regular rhythm and normal heart sounds.   No murmur heard.  Pulmonary/Chest: Effort normal and breath sounds normal. No respiratory distress.   Abdominal: Soft. There is no tenderness.   Musculoskeletal: Normal range of motion. She exhibits no edema.   Neurological: She is alert and oriented to person, place, and time. She has normal strength. A sensory deficit is present.   Mild sensory deficit on the right. Decreased sensation on the right compared to the left. No motor deficits. No other neurological deficits.   Skin: Skin is warm and dry.   Psychiatric: She has a normal mood and affect. Her behavior is normal.   Nursing note and vitals reviewed.      Procedures         ED Course  ED Course as of Dec 05 0728   Tue Dec 04, 2018   2206 Dr. Thompson has discussed CT head and CT perfusion with radiology who is concerned for possible hemorrhagic conversion of recent CVA and thrombus of the basalar artery and recommends MRI and MRA.  [JW]   Wed Dec 05, 2018   0047 Dr. Thompson spoke with [facility]. There is an area of subacute hemorrhage on the thalamus more pronounced than on previous exam.  [JW]   0140 Intensivist paged.  [JW]   0218 Pt states that the \"heaviness\" on her right side is also returning.  Although the area of concern on MRI is very small, given her progression of symptoms I have discussed the case with Dr. Charles Quiles in the ICU who will watch her overnight.  [CP]      ED Course User Index  [CP] Jamarcus Thompson DO  [JW] David Han     Recent Results (from the past 24 hour(s))   Basic Metabolic Panel    Collection Time: 12/04/18  6:10 AM   Result Value Ref Range    Glucose 103 (H) 70 - 100 mg/dL    BUN 13 9 - 23 mg/dL    Creatinine 0.81 0.60 - 1.30 mg/dL    Sodium 139 132 - 146 mmol/L    Potassium 3.6 " 3.5 - 5.5 mmol/L    Chloride 106 99 - 109 mmol/L    CO2 27.0 20.0 - 31.0 mmol/L    Calcium 9.2 8.7 - 10.4 mg/dL    eGFR  African Amer 90 >60 mL/min/1.73    BUN/Creatinine Ratio 16.0 7.0 - 25.0    Anion Gap 6.0 3.0 - 11.0 mmol/L   POC Glucose Once    Collection Time: 12/04/18  7:33 AM   Result Value Ref Range    Glucose 105 70 - 130 mg/dL   POC Glucose Once    Collection Time: 12/04/18 12:13 PM   Result Value Ref Range    Glucose 106 70 - 130 mg/dL   Gold Top - SST    Collection Time: 12/04/18  9:22 PM   Result Value Ref Range    Extra Tube Hold for add-ons.    AST    Collection Time: 12/04/18  9:23 PM   Result Value Ref Range    AST (SGOT) 24 0 - 33 U/L   ALT    Collection Time: 12/04/18  9:23 PM   Result Value Ref Range    ALT (SGPT) 24 7 - 40 U/L   Green Top (Gel)    Collection Time: 12/04/18  9:23 PM   Result Value Ref Range    Extra Tube Hold for add-ons.    Lavender Top    Collection Time: 12/04/18  9:23 PM   Result Value Ref Range    Extra Tube hold for add-on    CBC Auto Differential    Collection Time: 12/04/18  9:23 PM   Result Value Ref Range    WBC 9.95 3.50 - 10.80 10*3/mm3    RBC 4.74 3.89 - 5.14 10*6/mm3    Hemoglobin 14.0 11.5 - 15.5 g/dL    Hematocrit 40.9 34.5 - 44.0 %    MCV 86.3 80.0 - 99.0 fL    MCH 29.5 27.0 - 31.0 pg    MCHC 34.2 32.0 - 36.0 g/dL    RDW 13.3 11.3 - 14.5 %    RDW-SD 42.4 37.0 - 54.0 fl    MPV 10.6 6.0 - 12.0 fL    Platelets 380 150 - 450 10*3/mm3    Neutrophil % 62.1 41.0 - 71.0 %    Lymphocyte % 31.5 24.0 - 44.0 %    Monocyte % 4.9 0.0 - 12.0 %    Eosinophil % 1.3 0.0 - 3.0 %    Basophil % 0.2 0.0 - 1.0 %    Immature Grans % 0.1 0.0 - 0.6 %    Neutrophils, Absolute 6.18 1.50 - 8.30 10*3/mm3    Lymphocytes, Absolute 3.13 0.60 - 4.80 10*3/mm3    Monocytes, Absolute 0.49 0.00 - 1.00 10*3/mm3    Eosinophils, Absolute 0.13 0.00 - 0.30 10*3/mm3    Basophils, Absolute 0.02 0.00 - 0.20 10*3/mm3    Immature Grans, Absolute 0.01 0.00 - 0.03 10*3/mm3   Basic Metabolic Panel    Collection  Time: 12/04/18  9:23 PM   Result Value Ref Range    Glucose 112 (H) 70 - 100 mg/dL    BUN 19 9 - 23 mg/dL    Creatinine 0.92 0.60 - 1.30 mg/dL    Sodium 138 132 - 146 mmol/L    Potassium 3.6 3.5 - 5.5 mmol/L    Chloride 104 99 - 109 mmol/L    CO2 28.0 20.0 - 31.0 mmol/L    Calcium 10.2 8.7 - 10.4 mg/dL    eGFR  African Amer 78 >60 mL/min/1.73    BUN/Creatinine Ratio 20.7 7.0 - 25.0    Anion Gap 6.0 3.0 - 11.0 mmol/L   POC Troponin, Rapid    Collection Time: 12/04/18  9:29 PM   Result Value Ref Range    Troponin I 0.00 0.00 - 0.07 ng/mL   aPTT    Collection Time: 12/04/18 10:00 PM   Result Value Ref Range    PTT 30.3 24.0 - 31.0 seconds   Light Blue Top    Collection Time: 12/04/18 10:00 PM   Result Value Ref Range    Extra Tube hold for add-on      Note: In addition to lab results from this visit, the labs listed above may include labs taken at another facility or during a different encounter within the last 24 hours. Please correlate lab times with ED admission and discharge times for further clarification of the services performed during this visit.    MRI Angiogram Neck With Contrast   Final Result   1.  No significant stenosis or occlusion of the extracranial internal carotid    arteries bilaterally.   2.  Artifact limits evaluation of the proximal right common carotid artery.    Mild stenosis (less than 50%) is considered.      THIS DOCUMENT HAS BEEN ELECTRONICALLY SIGNED BY BROOKE KRISHNA MD      MRI Angiogram Head Without Contrast   Final Result   1. There is mild stenosis of the mid basilar artery. The degree of stenosis is    approximately 35%. No occlusion.    2. There is approximately 75% stenosis of the clinoid segment of the left    internal carotid artery. Measurement of the stenosis can be accentuated by    artifact. Mild stenosis (40%) of the petrous left internal carotid artery.    3. There is approximately 50% stenosis of the cavernous right internal carotid    artery. 40% stenosis of the petrous  right internal carotid artery.    4. 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    5. Mild stenoses of the M2 segments of the left middle cerebral artery.       THIS DOCUMENT HAS BEEN ELECTRONICALLY SIGNED BY BROOKE KRISHNA MD      CT Cerebral Perfusion With & Without Contrast   Final Result   Addendum 1 of 1   When correlated with the MRI brain from the same day, the delayed    perfusion    within the left thalamus can be contributed by the subacute hemorrhage and       hemosiderin in this region. Posteriorly within the left thalamus, subacute       infarction was also considered on MRI. Refer to this MRI report for    further    discussion.      THIS DOCUMENT HAS BEEN ELECTRONICALLY SIGNED BY BROOKE KRISHNA MD      Final   1.  There is a focus of delayed perfusion within the left thalamus. There is    decreased cerebral blood flow and cerebral blood volume in this region. These    findings are consistent with an acute infarct.   2.  If further evaluation is clinically indicated, an MRI of the brain is    recommended.      THIS REPORT CONTAINS FINDINGS THAT MAY BE CRITICAL TO PATIENT CARE. The    findings were verbally communicated via telephone conference with CB LEE    at 10:06 PM EST on 12/4/2018. The findings were acknowledged and understood.      THIS DOCUMENT HAS BEEN ELECTRONICALLY SIGNED BY BROOKE KRISHNA MD      MRI Brain With & Without Contrast   Final Result   1. Within the left thalamus, hemosiderin or calcification is visualized. There    is a peripheral rim of T1 hyperintensity, which has mildly progressed. This    likely represents subacute hemorrhage. Within the posterior aspect of the left    thalamus, mild hyperintensity is identified on the diffusion sequence. This can    be secondary to the hemorrhage or subacute infarction.    2. Restricted diffusion is again identified left periventricular region,    consistent with a subacute infarct.    3. There are scattered  nonspecific additional foci of high FLAIR signal    intensity within the cerebral white matter. This is suggestive of chronic small    vessel ischemic disease. Demyelination is within the differential.   4. Mild ventriculomegaly.       Findings are discussed with  Dr Valencia, 12/5/2018 12:47 AM EST. The findings    were acknowledged and understood.      THIS DOCUMENT HAS BEEN ELECTRONICALLY SIGNED BY BROOKE KRISHNA MD      XR Chest 1 View   Final Result   The lungs are clear. There is no confluent infiltrate.       THIS DOCUMENT HAS BEEN ELECTRONICALLY SIGNED BY BROOKE KRISHNA MD      CT Head Without Contrast Stroke Protocol   Final Result   Addendum 1 of 1   THIS REPORT CONTAINS FINDINGS THAT MAY BE CRITICAL TO PATIENT CARE. The    findings were verbally communicated via telephone conference with CB LEE    at 10:06 PM EST on 12/4/2018. The findings were acknowledged and    understood.      THIS DOCUMENT HAS BEEN ELECTRONICALLY SIGNED BY BROOKE KRISHNA MD      Final   1. Subtle increased density is identified within the left thalamus, suggestive    of calcification or hemorrhage. Hemorrhagic conversion of infarction is within    the differential. Correlation with MRI is recommended.    2. There is a hypodense subacute infarct in the left periventricular region.    3. Hyperdensity is identified within the basilar artery, and thrombus cannot be    excluded. This can be further evaluated with CTA or MRA.    4. There are scattered additional foci of hypodensity, likely representing    small vessel ischemic disease in a patient this age.    5. Mild atrophy.       COMMENT:    [place] Stroke Program Early CT Score (ASPECTS) = 10       THIS DOCUMENT HAS BEEN ELECTRONICALLY SIGNED BY BROOKE KRISHNA MD        Vitals:    12/05/18 0139 12/05/18 0140 12/05/18 0141 12/05/18 0142   BP:       Pulse: 90 78 80 78   Resp:       Temp:       TempSrc:       SpO2: 96% 98% 99% 99%   Weight:       Height:         Medications   sodium  chloride 0.9 % flush 10 mL (not administered)   iopamidol (ISOVUE-370) 76 % injection 50 mL (40 mL Intravenous Given 12/4/18 2130)   gadobenate dimeglumine (MULTIHANCE) injection 13 mL (13 mL Intravenous Given 12/5/18 0015)     ECG/EMG Results (last 24 hours)     Procedure Component Value Units Date/Time    ECG 12 Lead [[id number]] Collected:  12/04/18 2108     Updated:  12/04/18 2108                ICH Score (for Intracerebral Hemorrhage) reviewed and/or performed as part of the patient evaluation and treatment planning process.  The result associated with this review/performance is: 0           MDM    Final diagnoses:   Right-sided sensory deficit present   Subacute intracerebral hemorrhage (CMS/HCC)       Documentation assistance provided by elizabeth Han.  Information recorded by the scribe was done at my direction and has been verified and validated by me.     David Han  12/04/18 2119       David Han  12/05/18 0149       Jamarcus Thompson DO  12/05/18 8625

## 2018-12-05 NOTE — PROGRESS NOTES
Clinical Nutrition Note      Patient Name: Shweta Gonsales  MRN: [id number]  Admission date: 12/4/2018      Reason for Assessment:  Multidisciplinary Rounds    Additional information obtained during MDR: RN reports pt recently adm w/ CVA , also had CVA in [place] this fall. Neuro work-up in progress. Complains of heaviness on R sided.    Current diet: Diet Regular; Cardiac, Consistent Carbohydrate    Pertinent medical data reviewed    Intervention:  Menu provided, advised of alternate selections  Plan of care and goals reviewed    Monitor:  RD to follow per protocol      Varsha Forrest MS,RD,LD  12/05/18 5:30 PM  Time: 20min

## 2018-12-06 ENCOUNTER — TELEPHONE (OUTPATIENT)
Dept: NEUROSURGERY | Facility: CLINIC | Age: 53
End: 2018-12-06

## 2018-12-06 VITALS
DIASTOLIC BLOOD PRESSURE: 71 MMHG | HEIGHT: 62 IN | WEIGHT: 170 LBS | HEART RATE: 57 BPM | RESPIRATION RATE: 20 BRPM | BODY MASS INDEX: 31.28 KG/M2 | SYSTOLIC BLOOD PRESSURE: 130 MMHG | OXYGEN SATURATION: 99 % | TEMPERATURE: 98.2 F

## 2018-12-06 DIAGNOSIS — Q28.3 CAVERNOUS MALFORMATION: Primary | ICD-10-CM

## 2018-12-06 LAB
GLUCOSE BLDC GLUCOMTR-MCNC: 107 MG/DL (ref 70–130)
GLUCOSE BLDC GLUCOMTR-MCNC: 129 MG/DL (ref 70–130)

## 2018-12-06 PROCEDURE — 82962 GLUCOSE BLOOD TEST: CPT

## 2018-12-06 PROCEDURE — 97116 GAIT TRAINING THERAPY: CPT

## 2018-12-06 PROCEDURE — 99238 HOSP IP/OBS DSCHRG MGMT 30/<: CPT | Performed by: INTERNAL MEDICINE

## 2018-12-06 RX ADMIN — AMLODIPINE BESYLATE 5 MG: 5 TABLET ORAL at 08:01

## 2018-12-06 RX ADMIN — HYDROCHLOROTHIAZIDE 25 MG: 25 TABLET ORAL at 08:01

## 2018-12-06 RX ADMIN — SODIUM CHLORIDE, PRESERVATIVE FREE 3 ML: 5 INJECTION INTRAVENOUS at 08:02

## 2018-12-06 RX ADMIN — LOSARTAN POTASSIUM 50 MG: 50 TABLET ORAL at 08:00

## 2018-12-06 RX ADMIN — POTASSIUM CHLORIDE 40 MEQ: 750 CAPSULE, EXTENDED RELEASE ORAL at 02:13

## 2018-12-06 NOTE — PROGRESS NOTES
We will discontinue daily visits.  Patient needs a MRI of the brain with and without contrast in 6 weeks and can follow-up with me as an outpatient.

## 2018-12-06 NOTE — PLAN OF CARE
Problem: Patient Care Overview  Goal: Plan of Care Review  Outcome: Ongoing (interventions implemented as appropriate)   12/06/18 0954   Coping/Psychosocial   Plan of Care Reviewed With patient   Plan of Care Review   Progress improving   OTHER   Outcome Summary Pt ambulated 300ft and ascended/descended five steps with supervision. Pt demonstrated good stability with no LOB; improved gait mechancis with appropriate speed. Pt c/o intermittent heaviness in B LE. Pt does not present with any deficits requiring PT skilled care. Will D/C PT order. Recommend D/C home with assistance.        Problem: Stroke (Hemorrhagic) (Adult)  Goal: Signs and Symptoms of Listed Potential Problems Will be Absent, Minimized or Managed (Stroke)  Outcome: Ongoing (interventions implemented as appropriate)   12/06/18 1095   Goal/Outcome Evaluation   Problems Assessed (Stroke (Hemorrhagic)) communication impairment;motor/sensory impairment;cognitive impairment   Problems Present (Stroke (Hemorrhagic)) none

## 2018-12-06 NOTE — DISCHARGE INSTR - LAB
Working on scheduling follow up appointment for MRI with Dr. Green in the clinic in 6 weeks. Said they will call the patient at home if not completed before discharge.

## 2018-12-06 NOTE — THERAPY DISCHARGE NOTE
Acute Care - Physical Therapy Treatment Note/Discharge  [facility]     Patient Name: Shweta Gonsales  : 1965  MRN: [id number]  Today's Date: 2018  Onset of Illness/Injury or Date of Surgery: 18  Date of Referral to PT: 18  Referring Physician: ANDREW Teague    Admit Date: 2018    Visit Dx:    ICD-10-CM ICD-9-CM   1. Right-sided sensory deficit present R41.89 780.99   2. Subacute intracerebral hemorrhage (CMS/HCC) I61.9 431   3. Impaired mobility and ADLs Z74.09 799.89   4. 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    • Right-sided sensory deficit present       Physical Therapy Education     Title: PT OT SLP Therapies (Done)     Topic: Physical Therapy (Done)     Point: Mobility training (Done)     Learning Progress Summary           Patient Acceptance, E, DU,VU by KR at 2018  9:54 AM    Acceptance, E, NR by KR at 2018  1:09 PM   Family Acceptance, E, DU,VU by KR at 2018  9:54 AM    Acceptance, E, NR by KR at 2018  1:09 PM                   Point: Home exercise program (Done)     Learning Progress Summary           Patient Acceptance, E, DU,VU by KR at 2018  9:54 AM   Family Acceptance, E, DU,VU by KR at 2018  9:54 AM                   Point: Body mechanics (Done)     Learning Progress Summary           Patient Acceptance, E, DU,VU by KR at 2018  9:54 AM    Acceptance, E, NR by KR at 2018  1:09 PM   Family Acceptance, E, DU,VU by KR at 2018  9:54 AM    Acceptance, E, NR by KR at  12/5/2018  1:09 PM                   Point: Precautions (Done)     Learning Progress Summary           Patient Acceptance, E, DU,VU by KR at 12/6/2018  9:54 AM    Acceptance, E, NR by KR at 12/5/2018  1:09 PM   Family Acceptance, E, DU,VU by KR at 12/6/2018  9:54 AM    Acceptance, E, NR by KR at 12/5/2018  1:09 PM                               User Key     Initials Effective Dates Name Provider Type Discipline     04/03/18 -  Xin Connolly, PT Physical Therapist PT              PT Rehab Goals     Row Name 12/06/18 0954             Bed Mobility Goal 1 (PT)    Activity/Assistive Device (Bed Mobility Goal 1, PT)  bed mobility activities, all  -KR      [place] Level/Cues Needed (Bed Mobility Goal 1, PT)  independent  -KR      Time Frame (Bed Mobility Goal 1, PT)  2 weeks  -KR      Progress/Outcomes (Bed Mobility Goal 1, PT)  goal met  -KR         Transfer Goal 1 (PT)    Activity/Assistive Device (Transfer Goal 1, PT)  sit-to-stand/stand-to-sit;bed-to-chair/chair-to-bed  -KR      [place] Level/Cues Needed (Transfer Goal 1, PT)  independent  -KR      Time Frame (Transfer Goal 1, PT)  2 weeks  -KR      Progress/Outcome (Transfer Goal 1, PT)  goal met  -KR         Gait Training Goal 1 (PT)    Activity/Assistive Device (Gait Training Goal 1, PT)  gait (walking locomotion)  -KR      [place] Level (Gait Training Goal 1, PT)  independent  -KR      Distance (Gait Goal 1, PT)  400 feet  -KR      Time Frame (Gait Training Goal 1, PT)  2 weeks  -KR      Progress/Outcome (Gait Training Goal 1, PT)  goal partially met  -KR         Stairs Goal 1 (PT)    Activity/Assistive Device (Stairs Goal 1, PT)  ascending stairs;descending stairs  -KR      [place] Level/Cues Needed (Stairs Goal 1, PT)  independent  -KR      Number of Stairs (Stairs Goal 1, PT)  3  -KR      Time Frame (Stairs Goal 1, PT)  2 weeks  -KR      Progress/Outcome (Stairs Goal 1, PT)  goal partially met  -KR        User Key  (r) = Recorded By, (t) =  Taken By, (c) = Cosigned By    Initials Name Provider Type Discipline    KR Xin Connolly, PT Physical Therapist PT        Therapy Treatment  Rehabilitation Treatment Summary     Row Name 12/06/18 0954             Treatment Time/Intention    Discipline  physical therapist  -KR      Document Type  therapy note (daily note);discharge treatment  -KR2      Subjective Information  no complaints  -KR      Mode of Treatment  physical therapy  -KR      Care Plan Review  care plan/treatment goals reviewed;risks/benefits reviewed;patient/other agree to care plan  -KR      Care Plan Review, Other Participant(s)  spouse  -KR      Therapy Frequency (PT Clinical Impression)  daily  -KR      Patient Effort  good  -KR      Existing Precautions/Restrictions  no known precautions/restrictions  -KR      Recorded by [KR] Xin Connolly, PT 12/06/18 1339  [KR2] Xin Connolly, PT 12/06/18 1342      Row Name 12/06/18 0954             Vital Signs    Pre Systolic BP Rehab  127  -KR      Pre Treatment Diastolic BP  80  -KR      Pretreatment Heart Rate (beats/min)  102  -KR      Posttreatment Heart Rate (beats/min)  96  -KR      Pre SpO2 (%)  98  -KR      O2 Delivery Pre Treatment  room air  -KR      Post SpO2 (%)  93  -KR      O2 Delivery Post Treatment  room air  -KR      Pre Patient Position  Supine  -KR      Intra Patient Position  Standing  -KR      Post Patient Position  Supine  -KR      Recorded by [KR] Xin Connolly, PT 12/06/18 1339      Row Name 12/06/18 0954             Cognitive Assessment/Intervention    Additional Documentation  Cognitive Assessment/Intervention (Group)  -KR      Recorded by [KR] Xin Connolly, PT 12/06/18 1339      Row Name 12/06/18 0954             Cognitive Assessment/Intervention- PT/OT    Affect/Mental Status (Cognitive)  flat/blunted affect  -KR      Orientation Status (Cognition)  oriented x 4  -KR      Follows Commands (Cognition)  follows one step commands;over 90% accuracy  -KR      Cognitive  Function (Cognitive)  safety deficit  -KR      Safety Deficit (Cognitive)  mild deficit;awareness of need for assistance;insight into deficits/self awareness;safety precautions awareness;safety precautions follow-through/compliance  -KR      Personal Safety Interventions  fall prevention program maintained;nonskid shoes/slippers when out of bed pt refused gait belt  -KR      Recorded by [KR] Xin Connolly, PT 12/06/18 1339      Row Name 12/06/18 0954             Safety Issues, Functional Mobility    Safety Issues Affecting Function (Mobility)  awareness of need for assistance;insight into deficits/self awareness;safety precaution awareness;safety precautions follow-through/compliance  -KR      Impairments Affecting Function (Mobility)  endurance/activity tolerance  -KR      Recorded by [KR] Xin Connolly, PT 12/06/18 1339      Row Name 12/06/18 0954             Bed Mobility Assessment/Treatment    Bed Mobility Assessment/Treatment  supine-sit;sit-supine  -KR      Supine-Sit [place] (Bed Mobility)  independent  -KR      Sit-Supine [place] (Bed Mobility)  independent  -KR      Assistive Device (Bed Mobility)  head of bed elevated  -KR      Comment (Bed Mobility)  Pt demonstrated good safety awareness with bed mobility.   -KR      Recorded by [KR] Xin Connolly, PT 12/06/18 1339      Row Name 12/06/18 0954             Transfer Assessment/Treatment    Transfer Assessment/Treatment  sit-stand transfer;stand-sit transfer  -KR      Comment (Transfers)  Pt demonstrated good safety awareness with transfers.   -KR      Recorded by [KR] Xin Connolly, PT 12/06/18 1339      Row Name 12/06/18 0954             Sit-Stand Transfer    Sit-Stand [place] (Transfers)  independent  -KR      Recorded by [KR] Xin Connolly, PT 12/06/18 1339      Row Name 12/06/18 0954             Stand-Sit Transfer    Stand-Sit [place] (Transfers)  independent  -KR      Recorded by [KR] Xin Connolly, PT 12/06/18 1334       Row Name 12/06/18 0954             Gait/Stairs Assessment/Training    85773 - Gait Training Minutes   10  -KR      Gait/Stairs Assessment/Training  gait/ambulation independence  -KR      [place] Level (Gait)  supervision  -KR      Distance in Feet (Gait)  300  -KR      Pattern (Gait)  step-through  -KR      Deviations/Abnormal Patterns (Gait)  other (see comments) decreased step length  -KR      Bilateral Gait Deviations  heel strike decreased  -KR      Negotiation (Stairs)  stairs independence  -KR      [place] Level (Stairs)  supervision  -KR      Handrail Location (Stairs)  right side (ascending)  -KR      Number of Steps (Stairs)  5  -KR      Ascending Technique (Stairs)  step-over-step  -KR      Descending Technique (Stairs)  step-over-step  -KR      Comment (Gait/Stairs)  Pt demonstrated step through gait pattern with appropriate gait speed and decreased step length; demonstrated improved gait mechancis on the R. Pt c/o intermittent B LE heaviness during ambulation. Pt ascended/descended 5 steps with one handrail and step over step pattern. Pt demonstrated good stability with no LOB.   -KR      Recorded by [KR] Xin Connolly, PT 12/06/18 1339      Row Name 12/06/18 0954             Motor Skills Assessment/Interventions    Additional Documentation  Balance (Group)  -KR      Recorded by [KR] Xni Connolly, PT 12/06/18 1339      Row Name 12/06/18 0954             Balance    Balance  static sitting balance;static standing balance;dynamic standing balance  -KR      Recorded by [KR] Xin Connolly, PT 12/06/18 1339      Row Name 12/06/18 0954             Static Sitting Balance    Level of [place] (Unsupported Sitting, Static Balance)  independent  -KR      Sitting Position (Unsupported Sitting, Static Balance)  sitting on edge of bed  -KR      Recorded by [KR] Xin Connolly, PT 12/06/18 1339      Row Name 12/06/18 0954             Static Standing Balance    Level of [place] (Supported  Standing, Static Balance)  independent  -KR      Recorded by [NI] Xin Connolly, PT 12/06/18 1339      Row Name 12/06/18 0954             Dynamic Standing Balance    Level of [place], Reaches Outside Midline (Standing, Dynamic Balance)  supervision  -KR      Recorded by [NI] Xin Connolly, PT 12/06/18 1339      Row Name 12/06/18 0954             Positioning and Restraints    Pre-Treatment Position  in bed  -KR      Post Treatment Position  bed  -KR      In Bed  notified nsg;supine;call light within reach;encouraged to call for assist;with family/caregiver;side rails up x2;SCD pump applied  -KR      Recorded by [KR] Xin Connolly, PT 12/06/18 1339      Row Name 12/06/18 0954             Pain Assessment    Additional Documentation  Pain Scale: Numbers Pre/Post-Treatment (Group)  -KR      Recorded by [NI] Xin Connolly, PT 12/06/18 1339      Row Name 12/06/18 0954             Pain Scale: Numbers Pre/Post-Treatment    Pain Scale: Numbers, Pretreatment  0/10 - no pain  -KR      Pain Scale: Numbers, Post-Treatment  0/10 - no pain  -KR      Recorded by [KR] Xin Connolly, PT 12/06/18 1339      Row Name 12/06/18 0954             Plan of Care Review    Plan of Care Reviewed With  patient  -KR      Recorded by [NI] Xin Connolly, PT 12/06/18 1339      Row Name 12/06/18 0954             Outcome Summary/Treatment Plan (PT)    Daily Summary of Progress (PT)  progress toward functional goals is good  -KR      Recorded by [NI] Xin Connolly, PT 12/06/18 1339        User Key  (r) = Recorded By, (t) = Taken By, (c) = Cosigned By    Initials Name Effective Dates Discipline    Xin Oliveira, PT 04/03/18 -  PT             PT Recommendation and Plan  Anticipated Discharge Disposition (PT): home with assist, home with OP services  Planned Therapy Interventions (PT Eval): balance training, bed mobility training, gait training, stair training, strengthening, transfer training  Therapy Frequency (PT Clinical  Impression): daily  Outcome Summary/Treatment Plan (PT)  Daily Summary of Progress (PT): progress toward functional goals is good  Anticipated Discharge Disposition (PT): home with assist, home with OP services  Plan of Care Reviewed With: patient  Progress: improving  Outcome Summary: Pt ambulated 300ft and ascended/descended five steps with supervision. Pt demonstrated good stability with no LOB; improved gait mechancis with appropriate speed. Pt c/o intermittent heaviness in B LE. Pt does not present with any deficits requiring PT skilled care. Will D/C PT order. Recommend D/C home with assistance.     Outcome Measures     Row Name 12/06/18 0954 12/05/18 1309 12/05/18 0840       How much help from another person do you currently need...    Turning from your back to your side while in flat bed without using bedrails?  4  -KR  4  -KR  --    Moving from lying on back to sitting on the side of a flat bed without bedrails?  4  -KR  3  -KR  --    Moving to and from a bed to a chair (including a wheelchair)?  4  -KR  3  -KR  --    Standing up from a chair using your arms (e.g., wheelchair, bedside chair)?  4  -KR  3  -KR  --    Climbing 3-5 steps with a railing?  3  -KR  3  -KR  --    To walk in hospital room?  3  -KR  3  -KR  --    AM-PAC 6 Clicks Score  22  -KR  19  -KR  --       How much help from another is currently needed...    Putting on and taking off regular lower body clothing?  --  --  3  -CL    Bathing (including washing, rinsing, and drying)  --  --  2  -CL    Toileting (which includes using toilet bed pan or urinal)  --  --  3  -CL    Putting on and taking off regular upper body clothing  --  --  3  -CL    Taking care of personal grooming (such as brushing teeth)  --  --  3  -CL    Eating meals  --  --  4  -CL    Score  --  --  18  -CL       Modified [place] Scale    Modified [place] Scale  --  2 - Slight disability.  Unable to carry out all previous activities but able to look after own affairs without  assistance.  -KR  2 - Slight disability.  Unable to carry out all previous activities but able to look after own affairs without assistance.  -CL       Functional Assessment    Outcome Measure Options  AM-PAC 6 Clicks Basic Mobility (PT)  -KR  AM-PAC 6 Clicks Basic Mobility (PT);Modified [place]  -KR  AM-PAC 6 Clicks Daily Activity (OT)  -CL    Row Name 12/03/18 1712             How much help from another person do you currently need...    Turning from your back to your side while in flat bed without using bedrails?  4  -DM      Moving from lying on back to sitting on the side of a flat bed without bedrails?  4  -DM      Moving to and from a bed to a chair (including a wheelchair)?  4  -DM      Standing up from a chair using your arms (e.g., wheelchair, bedside chair)?  4  -DM      Climbing 3-5 steps with a railing?  3  -DM      To walk in hospital room?  4  -DM      AM-PAC 6 Clicks Score  23  -DM         Modified [place] Scale    Modified [place] Scale  1 - No significant disability despite symptoms.  Able to carry out all usual duties and activities.  -DM         Functional Assessment    Outcome Measure Options  AM-PAC 6 Clicks Basic Mobility (PT)  -DM        User Key  (r) = Recorded By, (t) = Taken By, (c) = Cosigned By    Initials Name Provider Type    DM Birgit Burrell, PT Physical Therapist    CL Eneida Alston, OT Occupational Therapist    Xin Oliveira, PT Physical Therapist           Time Calculation:   PT Charges     Row Name 12/06/18 0954             Time Calculation    Start Time  0954  -KR      PT Received On  12/06/18  -KR         Time Calculation- PT    Total Timed Code Minutes- PT  10 minute(s)  -KR         Timed Charges    13493 - Gait Training Minutes   10  -KR        User Key  (r) = Recorded By, (t) = Taken By, (c) = Cosigned By    Initials Name Provider Type    Xin Oliveira, PT Physical Therapist        Therapy Suggested Charges     Code   Minutes Charges    49115 (CPT®) Hc Pt Neuromusc  Re Education Ea 15 Min      29271 (CPT®) Hc Pt Ther Proc Ea 15 Min      21550 (CPT®) Hc Gait Training Ea 15 Min 10 1    83237 (CPT®) Hc Pt Therapeutic Act Ea 15 Min      55572 (CPT®) Hc Pt Manual Therapy Ea 15 Min      64783 (CPT®) Hc Pt Iontophoresis Ea 15 Min      45970 (CPT®) Hc Pt Elec Stim Ea-Per 15 Min      23031 (CPT®) Hc Pt Ultrasound Ea 15 Min      78398 (CPT®) Hc Pt Self Care/Mgmt/Train Ea 15 Min      91856 (CPT®) Hc Pt Prosthetic (S) Train Initial Encounter, Each 15 Min      52244 (CPT®) Hc Pt Orthotic(S)/Prosthetic(S) Encounter, Each 15 Min      15057 (CPT®) Hc Orthotic(S) Mgmt/Train Initial Encounter, Each 15min      Total  10 1          Therapy Charges for Today     Code Description Service Date Service Provider Modifiers Qty    16355155514 HC PT EVAL LOW COMPLEXITY 4 12/5/2018 Xin Connolly, PT GP 1    98619431552 HC PT THER SUPP EA 15 MIN 12/5/2018 Xin Connolly, PT GP 2    88069892093 HC GAIT TRAINING EA 15 MIN 12/6/2018 Xin Connolly, PT GP 1    08946028041 HC PT THER SUPP EA 15 MIN 12/6/2018 Xin Connolly, PT GP 1          PT G-Codes  Outcome Measure Options: AM-PAC 6 Clicks Basic Mobility (PT)  AM-PAC 6 Clicks Score: 22  Score: 18  Modified [place] Scale: 2 - Slight disability.  Unable to carry out all previous activities but able to look after own affairs without assistance.    PT Discharge Summary  Anticipated Discharge Disposition (PT): home with assist, home with OP services  Reason for Discharge: Discharge from facility, At baseline function  Outcomes Achieved: Refer to plan of care for updates on goals achieved    Erika Connolly PT  12/6/2018

## 2018-12-06 NOTE — NURSING NOTE
Discharge education given to patient and . Stated she was aware of all medications, side affects, and follow up appointments. Transport took patient to car in wheelchair.

## 2018-12-06 NOTE — NURSING NOTE
"Patient frustrated and demanding this morning with all staff. Continuously pulling off leads, unplugging self from monitor, and demanding to take a shower and remove IV \"whether nurse says its okay or not.\" Per Dr. Marinelli, okay for discharge home today. Will start discharge planning now. She said it was okay for the patient to shower. I explained to her that we will leave IV and leads on until time for discharge and can shower right before. She said okay. She refused to be stuck by the  again for morning labs. Will continue to monitor.   "

## 2018-12-06 NOTE — PLAN OF CARE
"Problem: Patient Care Overview  Goal: Plan of Care Review  Outcome: Ongoing (interventions implemented as appropriate)   12/06/18 0619   Coping/Psychosocial   Plan of Care Reviewed With patient   Plan of Care Review   Progress improving   OTHER   Outcome Summary Patient NIH 0, still c/o \"heaviness\" in right side, but sensation is normal, other VSS, will continue to monitor       Problem: Stroke (Hemorrhagic) (Adult)  Goal: Signs and Symptoms of Listed Potential Problems Will be Absent, Minimized or Managed (Stroke)  Outcome: Ongoing (interventions implemented as appropriate)   12/06/18 0619   Goal/Outcome Evaluation   Problems Assessed (Stroke (Hemorrhagic)) all   Problems Present (Stroke (Hemorrhagic)) motor/sensory impairment         "

## 2018-12-06 NOTE — DISCHARGE SUMMARY
Date of Discharge:  12/6/2018    Discharge Diagnosis:   Right sided weakness  Acute Ischemic Stroke in the Left Parietal Periventricular Matter  Cavernous Malformation     Presenting Problem/History of Present Illness  Right-sided sensory deficit present [R41.89]  Right-sided sensory deficit present [R41.89]       Hospital Course  Ms. Gonsales is a 52yo F who was originally admitted from 12/2 to 12/4 after she presented with right sided heaviness. An MRI of the brain was performed during that admission which showed a new acute ischemic stroke in the left parietal periventricular matter and she was admitted to the ICU. Her symptoms improved and she was discharged on 12/4 on Aspirin and Atorvastatin. She returned later that same day with recurrent right sided numbness. Her NIH upon presentation was 1. Her BP was elevated with a SBP > 170 at the time of admission but decreased spontaneously to the 130s-140s. A CT scan of the head was performed which showed an increased density within the left thalamus, likely representative of a hemorrhage. A CT perfusion study as obtained and consistent with delayed perfusion in the left thalamic region. An MRI Brain was then performed which showed finding suggestive of left thalamic subacute hemorrhage with restricted diffusion of the left periventricular region consistent with a subacute infarct. She was started on a Cardene drip and admitted to the Neuro ICU. The following day, her symptoms had resolved except for some heaviness of her right arm. Her NIHSS was down to 0. She was evaluated by Dr. Green from Neurosurgery. He felt that the abnormality on imaging was consistent with a cavernous malformation. He requested that the patient follow up with a repeat MRI of the brain with and without contrast in 6 weeks. She was previously scheduled to start physical therapy and will start this next week. She was advised to continue her blood pressure medications and keep a BP log. She will  follow up with her pcp in the next 1 week.     Procedures Performed  Imaging Results (last 72 hours)     Procedure Component Value Units Date/Time    MRI Angiogram Neck With Contrast [[id number]] Collected:  12/04/18 2348     Updated:  12/05/18 0107    Narrative:       EXAM:    MR Angiography Neck With Intravenous Contrast    EXAM DATE/TIME:    12/4/2018 11:48 PM    CLINICAL HISTORY:    The patient age is 53 years old and is female; Signs and symptoms; Other:   Right sided sensory deficit; Patient HX: Right sided sensory deficit right   sided heaviness HX of stroke 0.81 creat 74 gfr 13 ml multihance administered;   Additional info: Right sided sensory deficit. Right sided sensory deficit right   sided heaviness HX of stroke 0.81 creat 74 gfr 13 ml multihance administered   Right sided sensory deficit right sided heaviness HX of stroke 0.81 creat 74   gfr 13 ml multihance administered    TECHNIQUE:    Magnetic resonance angiography images of the neck with intravenous contrast.    CONTRAST:    13 mL of Multihance administered intravenously.      COMPARISON:    US DUPLEX CAROTID BILATERAL CAR 12/3/2018 9:27 AM    FINDINGS:    Right common carotid artery:  Artifact limits evaluation of the proximal   right common carotid artery. Mild stenosis (less than 50%) is considered.  No   significant stenosis or occlusion of the remaining right common carotid artery.    Right internal carotid artery:  Extracranial segment is patent with no   significant stenosis.  No dissection or occlusion.    Right external carotid artery:  No occlusion.    Right vertebral artery:  No occlusion or significant stenosis.      Left common carotid artery:  No significant stenosis.  No dissection or   occlusion.    Left internal carotid artery:  Extracranial segment is patent with no   significant stenosis.  No dissection or occlusion.    Left external carotid artery:  No occlusion.    Left vertebral artery:  No occlusion or significant  stenosis.       CAROTID STENOSIS REFERENCE USING NASCET CRITERIA:    % ICA stenosis = (1 - narrowest ICA diameter/diameter of distal cervical ICA)   x 100.    Mild - <50% stenosis.    Moderate - 50-69% stenosis.    Severe - 70-94% stenosis.    Near occlusion - 95-99% stenosis.    Occluded - 100% stenosis.      Impression:       1.  No significant stenosis or occlusion of the extracranial internal carotid   arteries bilaterally.  2.  Artifact limits evaluation of the proximal right common carotid artery.   Mild stenosis (less than 50%) is considered.    THIS DOCUMENT HAS BEEN ELECTRONICALLY SIGNED BY BROOKE KRISHNA MD    MRI Angiogram Head Without Contrast [[id number]] Collected:  12/04/18 2345     Updated:  12/05/18 0101    Narrative:       EXAM:    MR Angiogram Head Without Contrast, Arteries     EXAM DATE/TIME:    12/4/2018 11:45 PM     CLINICAL HISTORY:    53 years old, female; Signs and symptoms; Other: Right sided sensory deficit;   Patient HX: Right sided sensory deficit right sided heaviness HX of stroke;   Additional info: Right sided sensory deficit. Right sided sensory deficit right   sided heaviness HX of stroke     TECHNIQUE:    MR angiogram head without contrast. Exam focused on the arteries.     COMPARISON:    MRI BRAIN WO CONTRAST 12/2/2018 10:57 PM     FINDINGS:    Right internal carotid artery:  There is approximately 50% stenosis of the   cavernous right internal carotid artery. 40% stenosis of the petrous right   internal carotid artery.    Right anterior cerebral artery: No occlusion or significant stenosis. No   aneurysm.    Right middle cerebral artery:  No occlusion or significant stenosis. No   aneurysm.    Right posterior cerebral artery: No occlusion or significant stenosis. No   aneurysm.    Right vertebral artery: No occlusion or significant stenosis. No aneurysm.      Left internal carotid artery:  There is approximately 75% stenosis of the   clinoid segment of the left internal carotid  artery. Measurement of the   stenosis can be accentuated by artifact. Mild stenosis (40%) of the petrous   left internal carotid artery.    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    Left middle cerebral artery:  Mild stenoses of the M2 segments of the left   middle cerebral artery. No significant stenosis of the left M1 segment.    Left posterior cerebral artery: No occlusion or significant stenosis. No   aneurysm.    Left vertebral artery: No occlusion or significant stenosis. No aneurysm.    Basilar artery:  There is mild stenosis of the mid basilar artery. The degree   of stenosis is approximately 35%. No occlusion.       Impression:       1. There is mild stenosis of the mid basilar artery. The degree of stenosis is   approximately 35%. No occlusion.   2. There is approximately 75% stenosis of the clinoid segment of the left   internal carotid artery. Measurement of the stenosis can be accentuated by   artifact. Mild stenosis (40%) of the petrous left internal carotid artery.   3. There is approximately 50% stenosis of the cavernous right internal carotid   artery. 40% stenosis of the petrous right internal carotid artery.   4. 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   5. Mild stenoses of the M2 segments of the left middle cerebral artery.     THIS DOCUMENT HAS BEEN ELECTRONICALLY SIGNED BY BROOKE KRISHNA MD    CT Cerebral Perfusion With & Without Contrast [[id number]] Collected:  12/04/18 2126     Updated:  12/05/18 0050    Addenda:        When correlated with the MRI brain from the same day, the delayed   perfusion   within the left thalamus can be contributed by the subacute hemorrhage and     hemosiderin in this region. Posteriorly within the left thalamus, subacute     infarction was also considered on MRI. Refer to this MRI report for   further   discussion.    THIS DOCUMENT HAS BEEN ELECTRONICALLY SIGNED BY BROOKE  TOMI SANTANA  Signed:  12/05/18 0050 by Georgi Nava MD    Narrative:       EXAM:    CT Brain Perfusion With Intravenous Contrast    EXAM DATE/TIME:    12/4/2018 9:26 PM    CLINICAL HISTORY:    The patient age is 53 years old and is female; Signs and symptoms; Other:   Right sided heaviness/tingling in arm; Additional info: Stroke; Rt. Sided   heaviness and tingling in arm; H/o strokes Stroke; Rt. Sided heaviness and   tingling; H/o strokes    TECHNIQUE:    Axial computed tomography images of the brain with intravenous contrast using   cerebral perfusion protocol.  Post-processing parametric maps were created and   reviewed.  These include cerebral blood flow, cerebral blood volume and mean   transit time.  All CT scans at this facility use at least one of these dose   optimization techniques: automated exposure control; mA and/or kV adjustment   per patient size (includes targeted exams where dose is matched to clinical   indication); or iterative reconstruction.    MIP reconstructed images were created and reviewed.    CONTRAST:    40 mL of Isovue 370 administered intravenously.      COMPARISON:    MRI BRAIN WO CONTRAST 12/2/2018 10:57 PM    FINDINGS:    Cerebral blood flow: See below.    Cerebral blood volume: See below.    Mean transit time:  There is a focus of delayed perfusion within the left   thalamus on Mean Transit Time and TTDA images. There is decreased cerebral   blood flow and cerebral blood volume in this region. These findings are   consistent with an acute infarct.  A subacute infarct in the left   periventricular region is visualized on the CT head from the same day, but this   is not well visualized on CT perfusion imaging.      Brain:  Refer to the CT head report from the same day.    Other findings:  CBF<30% volume: 0 ml.  Tmax>6.0s volume: 0 ml.  Mismatch   volume: 0 ml. Mismatch ratio: none.      Impression:       1.  There is a focus of delayed perfusion within the left thalamus. There  is   decreased cerebral blood flow and cerebral blood volume in this region. These   findings are consistent with an acute infarct.  2.  If further evaluation is clinically indicated, an MRI of the brain is   recommended.    THIS REPORT CONTAINS FINDINGS THAT MAY BE CRITICAL TO PATIENT CARE. The   findings were verbally communicated via telephone conference with CB LEE   at 10:06 PM EST on 12/4/2018. The findings were acknowledged and understood.    THIS DOCUMENT HAS BEEN ELECTRONICALLY SIGNED BY BROOKE KRISHNA MD    MRI Brain With & Without Contrast [[id number]] Collected:  12/04/18 2346     Updated:  12/05/18 0048    Narrative:       EXAM:    MR Head Without and With Intravenous Contrast     EXAM DATE/TIME:    12/4/2018 11:46 PM     CLINICAL HISTORY:    53 years old, female; Signs and symptoms; Other: Right sided sensory deficit;   Patient HX: Right sided sensory deficit right sided heaviness HX of stroke 0.81   creat 74 gfr 13 ml multihance administered; Additional info: Right sided   sensory deficit. Concerns noted on head CT. Right sided sensory deficit right   sided heaviness HX of stroke 0.81 creat 74 gfr 13 ml multihance administered     TECHNIQUE:    MR of the head without and with intravenous contrast.     CONTRAST:    13 ml of Multihance administered intravenously.     COMPARISON:    MRI BRAIN WO CONTRAST 12/2/2018 10:57 PM     FINDINGS:    Brain:  Within the left thalamus, T2 hypointense hemosiderin or calcification   is visualized. There is a peripheral rim of T1 hyperintensity, which has mildly   progressed. This likely represents subacute hemorrhage. T1 iso-to   hyperintensity is again visualized centrally. Within the posterior aspect of   the left thalamus, mild hyperintensity is identified on the diffusion sequence,   which is isointense on the ADC trace sequence. This can be secondary to the   hemorrhage or subacute infarction. Restricted diffusion is again identified   left periventricular  region, consistent with a subacute infarct. A focus of   FLAIR hyperintensity is visualized in this region. No abnormally enhancing   intracranial mass is visualized. There are scattered nonspecific additional   foci of high FLAIR signal intensity within the cerebral white matter. There is   no mass effect or restricted diffusion associated with these foci. This is   suggestive of chronic small vessel ischemic disease. Demyelination is within   the differential. No abnormally enhancing intracranial mass is visualized. A   subcentimeter pineal cyst is visualized.   Ventricles:  Mild ventriculomegaly.    Bones/joints: Unremarkable.    Soft tissues: Normal.    Sinuses: Mild mucosal thickening of the ethmoid air cells. Magnetic   susceptibility artifact limits evaluation of the left maxillary sinus.    Mastoid air cells: No mastoid effusion.    Orbits: Evaluation of the optic globes is limited by motion artifact.       Impression:       1. Within the left thalamus, hemosiderin or calcification is visualized. There   is a peripheral rim of T1 hyperintensity, which has mildly progressed. This   likely represents subacute hemorrhage. Within the posterior aspect of the left   thalamus, mild hyperintensity is identified on the diffusion sequence. This can   be secondary to the hemorrhage or subacute infarction.   2. Restricted diffusion is again identified left periventricular region,   consistent with a subacute infarct.   3. There are scattered nonspecific additional foci of high FLAIR signal   intensity within the cerebral white matter. This is suggestive of chronic small   vessel ischemic disease. Demyelination is within the differential.  4. Mild ventriculomegaly.     Findings are discussed with  Dr Valencia, 12/5/2018 12:47 AM EST. The findings   were acknowledged and understood.    THIS DOCUMENT HAS BEEN ELECTRONICALLY SIGNED BY BROOKE KRISHNA MD    XR Chest 1 View [[id number]] Collected:  12/04/18 4645     Updated:   12/04/18 2302    Narrative:       EXAM:    XR Chest, 1 View     EXAM DATE/TIME:    12/4/2018 9:34 PM     CLINICAL HISTORY:    53 years old, female; Signs and symptoms; Other: See notes; Additional info:   Acute stroke protocol (onset < 12 hrs)     TECHNIQUE:    XR of the chest, 1 view.     COMPARISON:    CR XR CHEST 1 VW 12/2/2018 9:56 PM     FINDINGS:    Lungs:  The lungs are clear. There is no confluent infiltrate.    Pleural space: No pleural effusion. No pneumothorax.    Heart/Mediastinum:  The cardiac silhouette is stable in size.    Bones/joints: No visualized acute osseous abnormality.        Impression:       The lungs are clear. There is no confluent infiltrate.     THIS DOCUMENT HAS BEEN ELECTRONICALLY SIGNED BY GEORGI NAVA MD    CT Head Without Contrast Stroke Protocol [[id number]] Collected:  12/04/18 2126     Updated:  12/04/18 2212    Addenda:        THIS REPORT CONTAINS FINDINGS THAT MAY BE CRITICAL TO PATIENT CARE. The   findings were verbally communicated via telephone conference with CB LEE   at 10:06 PM EST on 12/4/2018. The findings were acknowledged and   understood.    THIS DOCUMENT HAS BEEN ELECTRONICALLY SIGNED BY GEORGI NAVA MD  Signed:  12/04/18 2212 by Georgi Nava MD    Narrative:       EXAM:    CT Head Without Intravenous Contrast     EXAM DATE/TIME:    12/4/2018 9:26 PM     CLINICAL HISTORY:    53 years old, female; Signs and symptoms; Weakness, extremity; Right;   Additional info: Stroke     TECHNIQUE:    Axial computed tomography images of the head/brain without intravenous   contrast.    All CT scans at this facility use at least one of these dose optimization   techniques: automated exposure control; mA and/or kV adjustment per patient   size (includes targeted exams where dose is matched to clinical indication); or   iterative reconstruction.     COMPARISON:    MRI BRAIN WO CONTRAST 12/2/2018 10:57 PM     FINDINGS:    Brain:  Subtle increased density is  identified within the left thalamus,   suggestive of calcification or hemorrhage. Hemorrhagic conversion of infarction   is within the differential. There is a hypodense subacute infarct in the left   periventricular region. This was acute on the prior MRI brain from 12/2/18.   There are scattered additional foci of hypodensity, likely representing small   vessel ischemic disease in a patient this age. The acuity of the white matter   disease is indeterminate. The white-gray differentiation is otherwise   preserved.    Midline shift:  There is no midline shift.    Ventricles:  There is mild prominence of the ventricles and sulci, compatible   with atrophy.    Bones/joints:  The calvarium demonstrates no evidence for a depressed   fracture.    Sinuses:  Mild mucosal thickening of scattered ethmoid air cells.    Mastoid air cells: No mastoid effusion.    Soft tissues: Normal.    Vasculature:  Hyperdensity is identified within the basilar artery, and   thrombus cannot be excluded. There is atherosclerotic calcification of the   intracranial internal carotid arteries.       Impression:       1. Subtle increased density is identified within the left thalamus, suggestive   of calcification or hemorrhage. Hemorrhagic conversion of infarction is within   the differential. Correlation with MRI is recommended.   2. There is a hypodense subacute infarct in the left periventricular region.   3. Hyperdensity is identified within the basilar artery, and thrombus cannot be   excluded. This can be further evaluated with CTA or MRA.   4. There are scattered additional foci of hypodensity, likely representing   small vessel ischemic disease in a patient this age.   5. Mild atrophy.     COMMENT:   [place] Stroke Program Early CT Score (ASPECTS) = 10     THIS DOCUMENT HAS BEEN ELECTRONICALLY SIGNED BY BROOKE KRISHNA MD             Consults:   Consults     Date and Time Order Name Status Description    12/5/2018 0629 Inpatient  "Neurosurgery Consult Completed     12/4/2018 2107 Inpatient Neurology Consult Stroke      12/3/2018 0427 Inpatient Neurology Consult Stroke Completed               Condition on Discharge:  Stable for discharge    Vital Signs  Temp:  [97.6 °F (36.4 °C)-98.4 °F (36.9 °C)] 98.2 °F (36.8 °C)  Heart Rate:  [] 68  Resp:  [16-20] 18  BP: ()/() 127/80    Physical Exam:  Telemetry:  Normal sinus rhythm.   Constitutional:  No acute distress.   Cardiovascular: Normal rate, regular and rhythm. Normal heart sounds.  No murmurs, gallop or rub.   Respiratory: No respiratory distress. Normal respiratory effort.  Normal breath sounds  Clear to ascultation.    Abdominal:  Soft. No masses. Non-tender. No distension. No HSM.   Extremities: No digital cyanosis. No clubbing.  No peripheral edema.   Neurological:   Alert and Oriented to person, place, and time.        Interval: (shift change )  1a. Level of Consciousness: 0-->Alert, keenly responsive  1b. LOC Questions: 0-->Answers both questions correctly  1c. LOC Commands: 0-->Performs both tasks correctly  2. Best Gaze: 0-->Normal  3. Visual: 0-->No visual loss  4. Facial Palsy: 0-->Normal symmetrical movements  5a. Motor Arm, Left: 0-->No drift, limb holds 90 (or 45) degrees for full 10 secs  5b. Motor Arm, Right: 0-->No drift, limb holds 90 (or 45) degrees for full 10 secs  6a. Motor Leg, Left: 0-->No drift, leg holds 30 degree position for full 5 secs  6b. Motor Leg, Right: 0-->No drift, leg holds 30 degree position for full 5 secs  7. Limb Ataxia: 0-->Absent  8. Sensory: 1-->Mild-to-moderate sensory loss, patient feels pinprick is less sharp or is dull on the affected side, or there is a loss of superficial pain with pinprick, but patient is aware of being touched(\"heaviness\")  9. Best Language: 0-->No aphasia, normal  10. Dysarthria: 0-->Normal  11. Extinction and Inattention (formerly Neglect): 0-->No abnormality    Total (NIH Stroke Scale): 1       Discharge " Disposition  Home or Self Care    Discharge Medications     Discharge Medications      Continue These Medications      Instructions Start Date   amLODIPine 5 MG tablet  Commonly known as:  NORVASC   5 mg, Oral, Daily      aspirin 81 MG tablet   81 mg, Oral, Daily      atorvastatin 80 MG tablet  Commonly known as:  LIPITOR   80 mg, Oral, Nightly      glipiZIDE 5 MG tablet  Commonly known as:  GLUCOTROL   5 mg, Oral, Daily      hydrochlorothiazide 25 MG tablet  Commonly known as:  HYDRODIURIL   25 mg, Oral, Daily      JANUVIA 50 MG tablet  Generic drug:  SITagliptin   50 mg, Oral, Daily      losartan 50 MG tablet  Commonly known as:  COZAAR   50 mg, Oral, Daily      potassium chloride 10 MEQ CR capsule  Commonly known as:  MICRO-K   10 mEq, Oral, Daily             Discharge Diet: Regular    Activity at Discharge: As tolerated    Follow-up Appointments  Future Appointments   Date Time Provider Department Center   12/10/2018  9:30 AM Margoth Danielle, PT [facility] None   1/24/2019  1:00 PM John Lebron MD [facility] None         Test Results Pending at Discharge: None       Ca Marinelli DO  12/06/18  10:14 AM    Time: Discharge 25 min

## 2018-12-06 NOTE — PROGRESS NOTES
Clinical Nutrition Note      Patient Name: Shweta Gonsales  MRN: [id number]  Admission date: 12/4/2018      Reason for Assessment:  Multidisciplinary Rounds    Additional information obtained during MDR:  RN reports pt doing well, eating well. NS determined pt to has cavernous angioma and will f/u in clinic w/ Dr. Green. Plan is to dc home today.    Current diet: Diet Regular; Cardiac, Consistent Carbohydrate    Pertinent medical data reviewed    Intervention:  Plan of care and goals reviewed    Monitor:  RD to follow per protocol      Varsha Forrest MS,RD,LD  12/06/18 10:53 AM  Time: 20min

## 2018-12-07 ENCOUNTER — READMISSION MANAGEMENT (OUTPATIENT)
Dept: CALL CENTER | Facility: HOSPITAL | Age: 53
End: 2018-12-07

## 2018-12-07 ENCOUNTER — TRANSCRIBE ORDERS (OUTPATIENT)
Dept: PHYSICAL THERAPY | Facility: HOSPITAL | Age: 53
End: 2018-12-07

## 2018-12-07 DIAGNOSIS — Z86.73 PERSONAL HISTORY OF TRANSIENT ISCHEMIC ATTACK (TIA), AND CEREBRAL INFARCTION WITHOUT RESIDUAL DEFICITS: Primary | ICD-10-CM

## 2018-12-07 NOTE — OUTREACH NOTE
Prep Survey      Responses   Facility patient discharged from?  [facility]   Is patient eligible?  Yes   Discharge diagnosis  Acute Ischemic Stroke in the Left Parietal    Does the patient have one of the following disease processes/diagnoses(primary or secondary)?  Stroke (TIA)   Does the patient have Home health ordered?  No   Is there a DME ordered?  No   Prep survey completed?  Yes          Leopoldo Adam RN

## 2018-12-08 ENCOUNTER — READMISSION MANAGEMENT (OUTPATIENT)
Dept: CALL CENTER | Facility: HOSPITAL | Age: 53
End: 2018-12-08

## 2018-12-08 NOTE — OUTREACH NOTE
Stroke Week 1 Survey      Responses   Facility patient discharged from?  [facility]   Does the patient have one of the following disease processes/diagnoses(primary or secondary)?  Stroke (TIA)   Is there a successful TCM telephone encounter documented?  No   Week 1 attempt successful?  No   Unsuccessful attempts  Attempt 1          Cayden Hightower RN

## 2018-12-10 ENCOUNTER — HOSPITAL ENCOUNTER (OUTPATIENT)
Dept: PHYSICAL THERAPY | Facility: HOSPITAL | Age: 53
Setting detail: THERAPIES SERIES
Discharge: HOME | End: 2018-12-10

## 2018-12-10 DIAGNOSIS — Z74.09 IMPAIRED FUNCTIONAL MOBILITY, BALANCE, GAIT, AND ENDURANCE: ICD-10-CM

## 2018-12-10 DIAGNOSIS — I69.30 HISTORY OF HEMORRHAGIC CEREBROVASCULAR ACCIDENT (CVA) WITH RESIDUAL DEFICIT: Primary | ICD-10-CM

## 2018-12-10 DIAGNOSIS — I63.81 CEREBROVASCULAR ACCIDENT (CVA) DUE TO OCCLUSION OF SMALL ARTERY (HCC): ICD-10-CM

## 2018-12-10 PROCEDURE — 97161 PT EVAL LOW COMPLEX 20 MIN: CPT | Performed by: PHYSICAL THERAPIST

## 2018-12-10 NOTE — THERAPY EVALUATION
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 Impaired functional mobility, balance, gait, and endurance Z74.09 V49.89   3. Cerebrovascular accident (CVA) due to occlusion of small artery I63.81 434.91       Patient History     Row Name 12/10/18 0930             History    Chief Complaint  Balance Problems  -KL      Date Current Problem(s) Began  18  -      Brief Description of Current Complaint  Patient states she went to the ER because the R side of body felt heavy and weak, as well as high blood pressure. Pt states she was admitted to the ICU and had a full stroke work up. Pt also states that in Oct she was in [place] when she had a stroke. This was in Oct of this year. 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 Pt states when  she is tired her leg is heavier to lift. Pt relates on occasion she is limited with endurance. Pt denies pain on the right side. Pt also says she has been diagnosed with RA but has no symptoms or see a rheumatologist. Pt's goals for therapy are to improve her strength and heaviness.   -KL      Current Tobacco Use  no  -KL      Hand Dominance  right-handed  -KL         Pain     Pain at Present  0  -KL      Pain at Best  0  -KL      Pain at Worst  0  -KL         Fall Risk Assessment    Any falls in the past year:  No  -KL         Daily Activities    Primary Language  English  -KL      Are you able to read  Yes  -KL      Are you able to write  Yes  -KL      Pt Participated in POC and Goals  Yes  -KL         Safety    Are you being hurt, hit, or frightened by anyone at home or in your life?  No  -KL        User Key  (r) = Recorded By, (t) = Taken By, (c) = Cosigned By    Initials Name Provider Type    Margoth Villareal, PT Physical Therapist              PT Neuro     Row Name 12/10/18 4704             Home Living    Living Arrangements  house  -KL      Home Accessibility  stairs to enter home;stairs within home  -KL         Vision-Basic Assessment    Current Vision  Wears glasses all the time  -KL         Cognition    Overall Cognitive Status  WFL  -         Sensation    Light Touch  Partial deficits in the RLE;Partial deficits in the RUE  -KL         General ROM    GENERAL ROM COMMENTS  All AROM is WFL   -KL         MMT (Manual Muscle Testing)    Rt Lower Ext  Rt Hip Flexion;Rt Hip Extension;Rt Hip ABduction;Rt Hip ADduction;Rt Knee Extension;Rt Knee Flexion;Rt Ankle Dorsiflexion  -      Lt Lower Ext  Lt Hip Flexion;Lt Hip Extension;Lt Hip ABduction;Lt Hip ADduction;Lt Knee Extension;Lt Knee Flexion;Lt Ankle Dorsiflexion  -         MMT Right Lower Ext    Rt Hip Flexion MMT, Gross Movement  (5/5) normal  -KL      Rt Hip Extension MMT, Gross Movement  (5/5) normal  -KL      Rt Hip ABduction MMT, Gross Movement   (5/5) normal  -KL      Rt Hip ADduction MMT, Gross Movement  (4/5) good  -KL      Rt Knee Extension MMT, Gross Movement  (5/5) normal  -KL      Rt Knee Flexion MMT, Gross Movement  (5/5) normal  -KL      Rt Ankle Dorsiflexion MMT, Gross Movement  (5/5) normal  -KL         MMT Left Lower Ext    Lt Hip Flexion MMT, Gross Movement  (5/5) normal  -KL      Lt Hip Extension MMT, Gross Movement  (5/5) normal  -KL      Lt Hip ABduction MMT, Gross Movement  (5/5) normal  -KL      Lt Hip ADduction MMT, Gross Movement  (4/5) good  -KL      Lt Knee Extension MMT, Gross Movement  (5/5) normal  -KL      Lt Knee Flexion MMT, Gross Movement  (5/5) normal  -KL      Lt Ankle Dorsiflexion MMT, Gross Movement  (5/5) normal  -KL         Bed Mobility Assessment/Treatment    Comment (Bed Mobility)  Independent   -KL         Transfers    Comment (Transfers)  Independent   -KL         Gait/Stairs Assessment/Training    Comment (Gait/Stairs)  Normalized gait pattern   -        User Key  (r) = Recorded By, (t) = Taken By, (c) = Cosigned By    Initials Name Provider Type    Margoth Villareal, PT Physical Therapist                  Therapy Education  Given: HEP  Program: New  How Provided: Verbal, Demonstration, Written  Provided to: Patient  Level of Understanding: Verbalized, Demonstrated    PT OP Goals     Row Name 12/10/18 1302 12/10/18 0930       PT Short Term Goals    STG Date to Achieve  --  12/24/18  -    STG 1  --  Patient will report compliance with HEP.   -    STG 1 Progress  --  New  -       Long Term Goals    LTG Date to Achieve  --  01/07/19  -    LTG 1  --  Patient will be I with HEP.   -    LTG 1 Progress  --  New  -    LTG 2  --  Patient will return to gym on a regular basis in order to resume normal activity.   -    LTG 2 Progress  --  New  -       Time Calculation    PT Goal Re-Cert Due Date  03/10/19  -KL  03/10/19  -      User Key  (r) = Recorded By, (t) = Taken By, (c) = Cosigned By    Initials  Name Provider Type    Margoth Villareal, PT Physical Therapist          PT Assessment/Plan     Row Name 12/10/18 1256          PT Assessment    Functional Limitations  Limitations in community activities;Limitation in home management  -IAN     Impairments  Endurance;Balance  -     Assessment Comments  Patient is a 53 YOF that presents to PT post CVA. She demonstrates normalized gait pattern, however is experiencing R sided heaviness. Pt will be seen for a few visits to establish a HEP in order to independently manage in her home or at her local gym.   -IAN     Please refer to paper survey for additional self-reported information  Yes  -KL     Rehab Potential  Good  -KL     Patient/caregiver participated in establishment of treatment plan and goals  Yes  -KL     Patient would benefit from skilled therapy intervention  Yes  -KL        PT Plan    PT Frequency  1x/week  -IAN     Predicted Duration of Therapy Intervention (Therapy Eval)  4 visits   -IAN     Planned CPT's?  PT EVAL LOW COMPLEXITY: 69395;PT THER ACT EA 15 MIN: 89483;PT THER PROC EA 15 MIN: 90063;PT MANUAL THERAPY EA 15 MIN: 86497;PT NEUROMUSC RE-EDUCATION EA 15 MIN: 02469;PT GAIT TRAINING EA 15 MIN: 95332  -KL     PT Plan Comments  Patient will be seen for 2-4 visits with treatment to include strengthening, stretching, manual therapy, neuromuscular re-education, balance, gait and endurance training.   -       User Key  (r) = Recorded By, (t) = Taken By, (c) = Cosigned By    Initials Name Provider Type    Margoth Villareal, PT Physical Therapist                             Outcome Measure Options: 10 Meter Walk, Timed Up and Go (TUG), FGA (Functional Gait Assessment), Mini-Best Test  10 Meter Walk Test Self-Selected Velocity  Self-Selected Velocity: Trial 1: 10.35 sec.  10 Meter Walk Test Fast Velocity  10 Meter Walk Fast Velocity: Trial 1: 5.85 sec.  Mini Best  Mini Best Score/Comments: 28/28  Timed Up and Go (TUG)  TUG Test 1: 10.25  seconds    Time Calculation:   Start Time: 0930   Therapy Suggested Charges     Code   Minutes Charges    None           Therapy Charges for Today     Code Description Service Date Service Provider Modifiers Qty    02704539023 HC PT EVAL LOW COMPLEXITY 4 12/10/2018 Margoth Danielle, PT GP 1          PT G-Codes  Outcome Measure Options: 10 Meter Walk, Timed Up and Go (TUG), FGA (Functional Gait Assessment), Mini-Best Test  TUG Test 1: 10.25 seconds         Margoth Danielle, PT  12/10/2018

## 2018-12-11 ENCOUNTER — READMISSION MANAGEMENT (OUTPATIENT)
Dept: CALL CENTER | Facility: HOSPITAL | Age: 53
End: 2018-12-11

## 2018-12-11 NOTE — OUTREACH NOTE
Stroke Week 1 Survey      Responses   Facility patient discharged from?  [place]   Does the patient have one of the following disease processes/diagnoses(primary or secondary)?  Stroke (TIA)   Is there a successful TCM telephone encounter documented?  No   Week 1 attempt successful?  No   Unsuccessful attempts  Attempt 2          Marlin Osborn RN

## 2018-12-12 ENCOUNTER — READMISSION MANAGEMENT (OUTPATIENT)
Dept: CALL CENTER | Facility: HOSPITAL | Age: 53
End: 2018-12-12

## 2018-12-12 NOTE — OUTREACH NOTE
Stroke Week 2 Survey      Responses   Facility patient discharged from?  [place]   Does the patient have one of the following disease processes/diagnoses(primary or secondary)?  Stroke (TIA)   Call start time  1439   Call end time  1445   Discharge diagnosis  Acute Ischemic Stroke in the Left Parietal    Meds reviewed with patient/caregiver?  Yes   Is the patient having any side effects they believe may be caused by any medication additions or changes?  No   Does the patient have all medications ordered at discharge?  N/A   Is the patient taking all medications as directed (includes completed medication regime)?  Yes   Does the patient have a primary care provider?   Yes   Does the patient have an appointment with their PCP within 7 days of discharge?  Yes   Has the patient kept scheduled appointments due by today?  Yes   Comments  She has seen PCP.   Has home health visited the patient within 72 hours of discharge?  N/A   Psychosocial issues?  No   Does the patient require any assistance with activities of daily living such as eating, bathing, dressing, walking, etc.?  No   Does the patient have any residual symptoms from stroke/TIA?  Yes   Residual symptoms comments  Residual One sided Weakness   Comments  Pt would like adult day care since she wants to leave the house for a while. Advised pt to call PCP and discuss if she qualifies for out patient PT r/t her one sided weakness.   Did the patient receive a copy of their discharge instructions?  Yes   Nursing interventions  Reviewed instructions with patient   What is the patient's perception of their health status since discharge?  Improving   Nursing interventions  Nurse provided patient education   Is the patient able to teach back FAST for Stroke?  Yes   Is the patient/caregiver able to teach back signs and symptoms related to disease process for when to call PCP?  Yes   Is the patient/caregiver able to teach back signs and symptoms related to disease process  for when to call 911?  Yes   Is the patient/caregiver able to teach back the hierarchy of who to call/visit for symptoms/problems? PCP, Specialist, Home health nurse, Urgent Care, ED, 911  Yes          Will Israel RN

## 2018-12-19 ENCOUNTER — HOSPITAL ENCOUNTER (OUTPATIENT)
Dept: PHYSICAL THERAPY | Facility: HOSPITAL | Age: 53
Setting detail: THERAPIES SERIES
Discharge: HOME | End: 2018-12-19

## 2018-12-19 ENCOUNTER — READMISSION MANAGEMENT (OUTPATIENT)
Dept: CALL CENTER | Facility: HOSPITAL | Age: 53
End: 2018-12-19

## 2018-12-19 DIAGNOSIS — Z74.09 IMPAIRED FUNCTIONAL MOBILITY, BALANCE, GAIT, AND ENDURANCE: ICD-10-CM

## 2018-12-19 DIAGNOSIS — I69.30 HISTORY OF HEMORRHAGIC CEREBROVASCULAR ACCIDENT (CVA) WITH RESIDUAL DEFICIT: Primary | ICD-10-CM

## 2018-12-19 PROCEDURE — 97112 NEUROMUSCULAR REEDUCATION: CPT | Performed by: PHYSICAL THERAPIST

## 2018-12-19 PROCEDURE — 97110 THERAPEUTIC EXERCISES: CPT | Performed by: PHYSICAL THERAPIST

## 2018-12-19 NOTE — THERAPY TREATMENT NOTE
Outpatient Physical Therapy Neuro Treatment Note  [facility]     Patient Name: Shweta Gonsales  : 1965  MRN: [id number]  Today's Date: 2018      Visit Date: 2018    Visit Dx:    ICD-10-CM ICD-9-CM   1. 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    • Right-sided sensory deficit present           PT Neuro     Row Name 18 6557             Subjective Comments    Subjective Comments  Patient states she continues to feel like her BP is raising  -KL         Subjective Pain    Pre-Treatment Pain Level  0  -KL      Post-Treatment Pain Level  0  -KL        User Key  (r) = Recorded By, (t) = Taken By, (c) = Cosigned By    Initials Name Provider Type    Margoth Villareal, PT Physical Therapist                  PT Assessment/Plan     Row Name 18 4946          PT Assessment    Assessment Comments  Patient very much perseverating on high blood pressure and how she is feeling. Her constant fear leaves her scared to move and laying in bed most of the day. She was educated extensively on the benefits of movement and even returning to normal schedule which included going to the gym. Patient was also instructed on resting as needed based on how she felt with BP checks throughout the day if she feels it as necessary. She performed all exercises in clinic well without reports of increased BP or heaviness.   -KL        PT Plan    PT Plan Comments   Patient will be seen for 1 more visit to re-check HEP.   -KL       User Key  (r) = Recorded By, (t) = Taken By, (c) = Cosigned By    Initials Name Provider Type    Margoth Villareal PT Physical Therapist               Exercises     Row Name 12/19/18 0916 12/19/18 0830          Subjective Comments    Subjective Comments  --  Patient states she continues to feel like her BP is raising  -KL        Subjective Pain    Pre-Treatment Pain Level  --  0  -KL     Post-Treatment Pain Level  --  0  -KL        Total Minutes    47697 - PT Therapeutic Exercise Minutes  20  -KL  --     07977 -  PT Neuromuscular Reeducation Minutes  10  -KL  --        Exercise 1    Exercise Name 1  --  Nu-Step L6  -KL     Time 1  --  8 min   -KL        Exercise 2    Exercise Name 2  --  standing hip abduction and extension   -KL     Reps 2  --  15 ea   -KL     Additional Comments  --  GTB  -KL        Exercise 3    Exercise Name 3  --  [facility] lunges   -KL     Reps 3  --  20  -KL        Exercise 4    Exercise Name 4  --  [facility] mini squats   -KL     Reps 4  --  10  -KL        Exercise 5    Exercise Name 5  --  SLS   -KL     Time 5  --  30 sec ea   -KL        Exercise 6    Exercise Name 6  --  tandem with EC  -KL     Time 6  --  30 sec ea   -KL        Exercise 7    Exercise Name 7  --  SLR   -KL     Reps 7  --  15  -KL        Exercise 8    Exercise Name 8  --  bridge with pillow squeeze   -KL     Reps 8  --  15  -KL        Exercise 9    Exercise Name 9  --  side stepping   -KL     Additional Comments  --  2 laps   -KL       User Key  (r) = Recorded By, (t) = Taken By, (c) = Cosigned By    Initials Name Provider Type    Margoth Villareal, PT Physical Therapist                            Therapy Education  Given: HEP  Program: New  How Provided: Verbal, Demonstration, Written  Provided to: Patient  Level of Understanding: Verbalized, Demonstrated              Time Calculation:   Start Time: 0830  Total Timed Code Minutes- PT: 30 minute(s)    Therapy Suggested Charges     Code   Minutes Charges    41763 (CPT®) Hc Pt Neuromusc Re Education Ea 15 Min 10 1    61483 (CPT®) Hc Pt Ther Proc Ea 15 Min 20 1    65804 (CPT®) Hc Gait Training Ea 15 Min      80492 (CPT®) Hc Pt Therapeutic Act Ea 15 Min      82264 (CPT®) Hc Pt Manual Therapy Ea 15 Min      78673 (CPT®) Hc Pt Ther Massage- Per 15 Min      53238 (CPT®) Hc Pt Iontophoresis Ea 15 Min      48569 (CPT®) Hc Pt Elec Stim Ea-Per 15 Min      70151 (CPT®) Hc Pt Ultrasound Ea 15 Min      52308 (CPT®) Hc Pt Self Care/Mgmt/Train Ea 15 Min      42665 (CPT®) Hc Pt Prosthetic (S) Train Initial Encounter, Each 15 Min      15806 (CPT®) Hc Orthotic(S) Mgmt/Train Initial Encounter, Each 15min      87922 (CPT®) Hc Pt Aquatic Therapy Ea 15 Min      91244 (CPT®) Hc Pt Orthotic(S)/Prosthetic(S) Encounter, Each 15 Min       (CPT®) Hc Pt Electrical Stim Unattended      Total  30 2        Therapy Charges for Today     Code Description Service Date Service Provider Modifiers Qty    93381093766 HC PT NEUROMUSC RE EDUCATION EA 15 MIN 12/19/2018 Margoth Danielle, PT GP 1    90958356852 HC PT THER PROC EA 15 MIN 12/19/2018 Margoth Danielle, PT GP 1                    Margoth Danielle, PT  12/19/2018

## 2018-12-19 NOTE — OUTREACH NOTE
Stroke Week 2 Survey      Responses   Facility patient discharged from?  [place]   Does the patient have one of the following disease processes/diagnoses(primary or secondary)?  Stroke (TIA)   Week 2 attempt successful?  Yes   Call start time  0938   Call end time  0942   Discharge diagnosis  Acute Ischemic Stroke in the Left Parietal    Medication alerts for this patient  increased b/p    Meds reviewed with patient/caregiver?  Yes   Is the patient taking all medications as directed (includes completed medication regime)?  Yes   Has the patient kept scheduled appointments due by today?  Yes   Has home health visited the patient within 72 hours of discharge?  N/A   Psychosocial issues?  No   Does the patient require any assistance with activities of daily living such as eating, bathing, dressing, walking, etc.?  No   Does the patient have any residual symptoms from stroke/TIA?  Yes   Residual symptoms comments  right side still some heaviness but it is improving. working with PT   Does the patient understand the diet ordered at discharge?  Yes   Comments  Going to PT and this is helping   What is the patient's perception of their health status since discharge?  Improving   Is the patient able to teach back FAST for Stroke?  Yes   Is the patient/caregiver able to teach back the risk factors for a stroke?  High blood pressure-goal below 120/80, Physical inactivity and obesity, High Cholesterol, Sleep apnea   Is the patient/caregiver able to teach back signs and symptoms related to disease process for when to call PCP?  Yes   Is the patient/caregiver able to teach back signs and symptoms related to disease process for when to call 911?  Yes   Is the patient/caregiver able to teach back the hierarchy of who to call/visit for symptoms/problems? PCP, Specialist, Home health nurse, Urgent Care, ED, 911  Yes   Additional teach back comments  Enc pt to continue keeping b/p log and watching for s/s of CVA   Week 2 call  completed?  Yes          Mary Mclaughlin, RN

## 2018-12-27 ENCOUNTER — READMISSION MANAGEMENT (OUTPATIENT)
Dept: CALL CENTER | Facility: HOSPITAL | Age: 53
End: 2018-12-27

## 2018-12-27 NOTE — OUTREACH NOTE
Stroke Week 3 Survey      Responses   Facility patient discharged from?  [place]   Does the patient have one of the following disease processes/diagnoses(primary or secondary)?  Stroke (TIA)   Week 3 attempt successful?  Yes   Revoke  Decline to participate   Medication comments  Reports new meds for BP.          Will Israel RN

## 2018-12-30 ENCOUNTER — HOSPITAL ENCOUNTER (EMERGENCY)
Facility: HOSPITAL | Age: 53
Discharge: HOME | End: 2018-12-31
Attending: EMERGENCY MEDICINE | Admitting: EMERGENCY MEDICINE

## 2018-12-30 ENCOUNTER — APPOINTMENT (OUTPATIENT)
Dept: GENERAL RADIOLOGY | Facility: HOSPITAL | Age: 53
End: 2018-12-30

## 2018-12-30 DIAGNOSIS — E87.6 HYPOKALEMIA: ICD-10-CM

## 2018-12-30 DIAGNOSIS — E11.9 TYPE 2 DIABETES MELLITUS WITHOUT COMPLICATION, WITHOUT LONG-TERM CURRENT USE OF INSULIN (HCC): ICD-10-CM

## 2018-12-30 DIAGNOSIS — I10 ELEVATED BLOOD PRESSURE READING WITH DIAGNOSIS OF HYPERTENSION: Primary | ICD-10-CM

## 2018-12-30 DIAGNOSIS — Z86.73 HISTORY OF CVA (CEREBROVASCULAR ACCIDENT): ICD-10-CM

## 2018-12-30 LAB
ALBUMIN SERPL-MCNC: 4.1 G/DL (ref 3.2–4.8)
ALBUMIN/GLOB SERPL: 1.3 G/DL (ref 1.5–2.5)
ALP SERPL-CCNC: 112 U/L (ref 25–100)
ALT SERPL W P-5'-P-CCNC: 19 U/L (ref 7–40)
ANION GAP SERPL CALCULATED.3IONS-SCNC: 11 MMOL/L (ref 3–11)
AST SERPL-CCNC: 17 U/L (ref 0–33)
BACTERIA UR QL AUTO: ABNORMAL /HPF
BASOPHILS # BLD AUTO: 0.02 10*3/MM3 (ref 0–0.2)
BASOPHILS NFR BLD AUTO: 0.2 % (ref 0–1)
BILIRUB SERPL-MCNC: 0.4 MG/DL (ref 0.3–1.2)
BILIRUB UR QL STRIP: NEGATIVE
BUN BLD-MCNC: 15 MG/DL (ref 9–23)
BUN/CREAT SERPL: 16.1 (ref 7–25)
CALCIUM SPEC-SCNC: 9.2 MG/DL (ref 8.7–10.4)
CHLORIDE SERPL-SCNC: 105 MMOL/L (ref 99–109)
CLARITY UR: CLEAR
CO2 SERPL-SCNC: 27 MMOL/L (ref 20–31)
COLOR UR: YELLOW
CREAT BLD-MCNC: 0.93 MG/DL (ref 0.6–1.3)
DEPRECATED RDW RBC AUTO: 42.1 FL (ref 37–54)
EOSINOPHIL # BLD AUTO: 0.14 10*3/MM3 (ref 0–0.3)
EOSINOPHIL NFR BLD AUTO: 1.1 % (ref 0–3)
ERYTHROCYTE [DISTWIDTH] IN BLOOD BY AUTOMATED COUNT: 13.5 % (ref 11.3–14.5)
GFR SERPL CREATININE-BSD FRML MDRD: 77 ML/MIN/1.73
GLOBULIN UR ELPH-MCNC: 3.2 GM/DL
GLUCOSE BLD-MCNC: 109 MG/DL (ref 70–100)
GLUCOSE UR STRIP-MCNC: NEGATIVE MG/DL
HCT VFR BLD AUTO: 34.3 % (ref 34.5–44)
HGB BLD-MCNC: 11.7 G/DL (ref 11.5–15.5)
HGB UR QL STRIP.AUTO: NEGATIVE
HYALINE CASTS UR QL AUTO: ABNORMAL /LPF
IMM GRANULOCYTES # BLD AUTO: 0.03 10*3/MM3 (ref 0–0.03)
IMM GRANULOCYTES NFR BLD AUTO: 0.2 % (ref 0–0.6)
KETONES UR QL STRIP: NEGATIVE
LEUKOCYTE ESTERASE UR QL STRIP.AUTO: ABNORMAL
LYMPHOCYTES # BLD AUTO: 3.74 10*3/MM3 (ref 0.6–4.8)
LYMPHOCYTES NFR BLD AUTO: 29.7 % (ref 24–44)
MCH RBC QN AUTO: 29.3 PG (ref 27–31)
MCHC RBC AUTO-ENTMCNC: 34.1 G/DL (ref 32–36)
MCV RBC AUTO: 86 FL (ref 80–99)
MONOCYTES # BLD AUTO: 0.58 10*3/MM3 (ref 0–1)
MONOCYTES NFR BLD AUTO: 4.6 % (ref 0–12)
NEUTROPHILS # BLD AUTO: 8.11 10*3/MM3 (ref 1.5–8.3)
NEUTROPHILS NFR BLD AUTO: 64.4 % (ref 41–71)
NITRITE UR QL STRIP: NEGATIVE
PH UR STRIP.AUTO: 5.5 [PH] (ref 5–8)
PLATELET # BLD AUTO: 323 10*3/MM3 (ref 150–450)
PMV BLD AUTO: 10.8 FL (ref 6–12)
POTASSIUM BLD-SCNC: 3.1 MMOL/L (ref 3.5–5.5)
PROT SERPL-MCNC: 7.3 G/DL (ref 5.7–8.2)
PROT UR QL STRIP: NEGATIVE
RBC # BLD AUTO: 3.99 10*6/MM3 (ref 3.89–5.14)
RBC # UR: ABNORMAL /HPF
REF LAB TEST METHOD: ABNORMAL
SODIUM BLD-SCNC: 143 MMOL/L (ref 132–146)
SP GR UR STRIP: 1.02 (ref 1–1.03)
SQUAMOUS #/AREA URNS HPF: ABNORMAL /HPF
UROBILINOGEN UR QL STRIP: ABNORMAL
WBC NRBC COR # BLD: 12.59 10*3/MM3 (ref 3.5–10.8)
WBC UR QL AUTO: ABNORMAL /HPF

## 2018-12-30 PROCEDURE — 93005 ELECTROCARDIOGRAM TRACING: CPT | Performed by: PHYSICIAN ASSISTANT

## 2018-12-30 PROCEDURE — 71045 X-RAY EXAM CHEST 1 VIEW: CPT

## 2018-12-30 PROCEDURE — 81001 URINALYSIS AUTO W/SCOPE: CPT | Performed by: PHYSICIAN ASSISTANT

## 2018-12-30 PROCEDURE — 80053 COMPREHEN METABOLIC PANEL: CPT | Performed by: PHYSICIAN ASSISTANT

## 2018-12-30 PROCEDURE — 85025 COMPLETE CBC W/AUTO DIFF WBC: CPT | Performed by: PHYSICIAN ASSISTANT

## 2018-12-30 PROCEDURE — 99283 EMERGENCY DEPT VISIT LOW MDM: CPT

## 2018-12-31 VITALS
OXYGEN SATURATION: 98 % | WEIGHT: 160 LBS | HEIGHT: 64 IN | SYSTOLIC BLOOD PRESSURE: 134 MMHG | BODY MASS INDEX: 27.31 KG/M2 | RESPIRATION RATE: 16 BRPM | HEART RATE: 75 BPM | TEMPERATURE: 98.3 F | DIASTOLIC BLOOD PRESSURE: 86 MMHG

## 2018-12-31 RX ORDER — POTASSIUM CHLORIDE 750 MG/1
40 CAPSULE, EXTENDED RELEASE ORAL ONCE
Status: COMPLETED | OUTPATIENT
Start: 2018-12-31 | End: 2018-12-31

## 2018-12-31 RX ORDER — ACETAMINOPHEN 325 MG/1
650 TABLET ORAL ONCE
Status: DISCONTINUED | OUTPATIENT
Start: 2018-12-31 | End: 2018-12-31 | Stop reason: HOSPADM

## 2018-12-31 RX ADMIN — POTASSIUM CHLORIDE 40 MEQ: 750 CAPSULE, EXTENDED RELEASE ORAL at 00:29

## 2019-01-02 ENCOUNTER — HOSPITAL ENCOUNTER (OUTPATIENT)
Dept: PHYSICAL THERAPY | Facility: HOSPITAL | Age: 54
Setting detail: THERAPIES SERIES
Discharge: HOME | End: 2019-01-02

## 2019-01-02 DIAGNOSIS — Z74.09 IMPAIRED FUNCTIONAL MOBILITY, BALANCE, GAIT, AND ENDURANCE: ICD-10-CM

## 2019-01-02 DIAGNOSIS — I69.30 HISTORY OF HEMORRHAGIC CEREBROVASCULAR ACCIDENT (CVA) WITH RESIDUAL DEFICIT: Primary | ICD-10-CM

## 2019-01-02 PROCEDURE — 97110 THERAPEUTIC EXERCISES: CPT | Performed by: PHYSICAL THERAPIST

## 2019-01-02 NOTE — THERAPY TREATMENT NOTE
Outpatient Physical Therapy Neuro Treatment Note  [facility]     Patient Name: Shweta Gonsales  : 1965  MRN: [id number]  Today's Date: 2019      Visit Date: 2019    Visit Dx:    ICD-10-CM ICD-9-CM   1. 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    • Right-sided sensory deficit present           PT Neuro     Row Name 19 1500             Subjective Comments    Subjective Comments  Patient relates she went to the ER on  because she was worrried about her high blood pressure   -KL         Subjective Pain    Pre-Treatment Pain Level  0  -KL      Post-Treatment Pain Level  0  -KL        User Key  (r) = Recorded By, (t) = Taken By, (c) = Cosigned By    Initials Name Provider Type    Margoth Villareal, PT Physical Therapist                  PT Assessment/Plan     Row Name 19 1554          PT Assessment    Assessment Comments  Patient met all goals this date and is compliant with HEP. She continues to have high anxiety about having another stroke. Extensive education given on ways to lower anxiety such as yoga or meditation. She was also instructed to speak with her doctor. Patient's chart will be left open for 30 days in the event she has a status change, however all goals were addressed this visit and she will be discharged at the end of 30 days.   -KL        PT Plan    PT Plan Comments  Patient will be discharged in 30 days if no  follow up is scheduled.   -KL       User Key  (r) = Recorded By, (t) = Taken By, (c) = Cosigned By    Initials Name Provider Type    Margoth Villareal, PT Physical Therapist               Exercises     Row Name 01/02/19 1556 01/02/19 1500          Subjective Comments    Subjective Comments  --  Patient relates she went to the ER on 12/31 because she was worrried about her high blood pressure   -KL        Subjective Pain    Pre-Treatment Pain Level  --  0  -KL     Post-Treatment Pain Level  --  0  -KL        Total Minutes    83520 - PT Therapeutic Exercise Minutes  40  -KL  --        Exercise 1    Exercise Name 1  --  Nu-Step L6  -KL     Time 1  --  10 min   -KL        Exercise 2    Exercise Name 2  --  Review of HEP with progressions noted on HEP paper; advanced T-Band   -KL        Exercise 3    Exercise Name 3  --  Extensive education given regarding anxiety causing increases in BP. Ways to lower anxiety level such as yoga or meditation   -       User Key  (r) = Recorded By, (t) = Taken By, (c) = Cosigned By    Initials Name Provider Type    Margoth Villareal, PT Physical Therapist                        PT OP Goals     Row Name 01/02/19 1553 01/02/19 1500       PT Short Term Goals    STG Date to Achieve  12/24/18  -KL  12/24/18  -KL    STG 1  Patient will report compliance with HEP.   -KL  Patient will report compliance with HEP.   -KL    STG 1 Progress  New  -  Met  -KL       Long Term Goals    LTG Date to Achieve  01/07/19  -KL  01/07/19  -KL    LTG 1  Patient will be I with HEP.   -KL  Patient will be I with HEP.   -KL    LTG 1 Progress  New  -KL  Met  -KL    LTG 2  Patient will return to gym on a regular basis in order to resume normal activity.   -KL  Patient will return to gym on a regular basis in order to resume normal activity.   -KL    LTG 2 Progress  New  -  Met  -KL      User Key  (r) = Recorded By, (t) = Taken By, (c) = Cosigned By    Initials Name Provider Type    Margoth Villareal  D, PT Physical Therapist          Therapy Education  Given: HEP, Symptoms/condition management  Program: Reinforced, Progressed  How Provided: Verbal, Written  Provided to: Patient  Level of Understanding: Verbalized, Demonstrated              Time Calculation:   Start Time: 1500  Total Timed Code Minutes- PT: 40 minute(s)   Therapy Suggested Charges     Code   Minutes Charges    89269 (CPT®) Hc Pt Neuromusc Re Education Ea 15 Min      34062 (CPT®) Hc Pt Ther Proc Ea 15 Min 40 3    94652 (CPT®) Hc Gait Training Ea 15 Min      24577 (CPT®) Hc Pt Therapeutic Act Ea 15 Min      21727 (CPT®) Hc Pt Manual Therapy Ea 15 Min      06715 (CPT®) Hc Pt Ther Massage- Per 15 Min      22688 (CPT®) Hc Pt Iontophoresis Ea 15 Min      37869 (CPT®) Hc Pt Elec Stim Ea-Per 15 Min      31545 (CPT®) Hc Pt Ultrasound Ea 15 Min      15411 (CPT®) Hc Pt Self Care/Mgmt/Train Ea 15 Min      43251 (CPT®) Hc Pt Prosthetic (S) Train Initial Encounter, Each 15 Min      13095 (CPT®) Hc Orthotic(S) Mgmt/Train Initial Encounter, Each 15min      59344 (CPT®) Hc Pt Aquatic Therapy Ea 15 Min      00291 (CPT®) Hc Pt Orthotic(S)/Prosthetic(S) Encounter, Each 15 Min       (CPT®) Hc Pt Electrical Stim Unattended      Total  40 3        Therapy Charges for Today     Code Description Service Date Service Provider Modifiers Qty    04833301278 HC PT THER PROC EA 15 MIN 1/2/2019 Margoth Danielle, PT GP 3                    Margoth Danielle, PT  1/2/2019

## 2019-01-03 ENCOUNTER — APPOINTMENT (OUTPATIENT)
Dept: PHYSICAL THERAPY | Facility: HOSPITAL | Age: 54
End: 2019-01-03

## 2019-01-04 ENCOUNTER — APPOINTMENT (OUTPATIENT)
Dept: GENERAL RADIOLOGY | Facility: HOSPITAL | Age: 54
End: 2019-01-04

## 2019-01-04 ENCOUNTER — HOSPITAL ENCOUNTER (EMERGENCY)
Facility: HOSPITAL | Age: 54
Discharge: HOME | End: 2019-01-04
Attending: EMERGENCY MEDICINE | Admitting: EMERGENCY MEDICINE

## 2019-01-04 VITALS
DIASTOLIC BLOOD PRESSURE: 80 MMHG | WEIGHT: 160 LBS | SYSTOLIC BLOOD PRESSURE: 124 MMHG | RESPIRATION RATE: 18 BRPM | HEART RATE: 88 BPM | BODY MASS INDEX: 27.31 KG/M2 | TEMPERATURE: 97.9 F | OXYGEN SATURATION: 98 % | HEIGHT: 64 IN

## 2019-01-04 DIAGNOSIS — R55 NEAR SYNCOPE: Primary | ICD-10-CM

## 2019-01-04 DIAGNOSIS — E86.9 VOLUME DEPLETION: ICD-10-CM

## 2019-01-04 LAB
ALBUMIN SERPL-MCNC: 4.73 G/DL (ref 3.2–4.8)
ALBUMIN/GLOB SERPL: 1.4 G/DL (ref 1.5–2.5)
ALP SERPL-CCNC: 125 U/L (ref 25–100)
ALT SERPL W P-5'-P-CCNC: 26 U/L (ref 7–40)
ANION GAP SERPL CALCULATED.3IONS-SCNC: 10 MMOL/L (ref 3–11)
AST SERPL-CCNC: 24 U/L (ref 0–33)
BACTERIA UR QL AUTO: NORMAL /HPF
BASOPHILS # BLD AUTO: 0.01 10*3/MM3 (ref 0–0.2)
BASOPHILS NFR BLD AUTO: 0.1 % (ref 0–1)
BILIRUB SERPL-MCNC: 0.5 MG/DL (ref 0.3–1.2)
BILIRUB UR QL STRIP: NEGATIVE
BUN BLD-MCNC: 17 MG/DL (ref 9–23)
BUN/CREAT SERPL: 18.1 (ref 7–25)
CALCIUM SPEC-SCNC: 9.8 MG/DL (ref 8.7–10.4)
CHLORIDE SERPL-SCNC: 100 MMOL/L (ref 99–109)
CLARITY UR: CLEAR
CO2 SERPL-SCNC: 28 MMOL/L (ref 20–31)
COLOR UR: YELLOW
CREAT BLD-MCNC: 0.94 MG/DL (ref 0.6–1.3)
DEPRECATED RDW RBC AUTO: 42.3 FL (ref 37–54)
EOSINOPHIL # BLD AUTO: 0.07 10*3/MM3 (ref 0–0.3)
EOSINOPHIL NFR BLD AUTO: 0.6 % (ref 0–3)
ERYTHROCYTE [DISTWIDTH] IN BLOOD BY AUTOMATED COUNT: 13.4 % (ref 11.3–14.5)
GFR SERPL CREATININE-BSD FRML MDRD: 76 ML/MIN/1.73
GLOBULIN UR ELPH-MCNC: 3.4 GM/DL
GLUCOSE BLD-MCNC: 80 MG/DL (ref 70–100)
GLUCOSE BLDC GLUCOMTR-MCNC: 92 MG/DL (ref 70–130)
GLUCOSE UR STRIP-MCNC: NEGATIVE MG/DL
HCT VFR BLD AUTO: 40.5 % (ref 34.5–44)
HGB BLD-MCNC: 14 G/DL (ref 11.5–15.5)
HGB UR QL STRIP.AUTO: NEGATIVE
HOLD SPECIMEN: NORMAL
HOLD SPECIMEN: NORMAL
HYALINE CASTS UR QL AUTO: NORMAL /LPF
IMM GRANULOCYTES # BLD AUTO: 0.02 10*3/MM3 (ref 0–0.03)
IMM GRANULOCYTES NFR BLD AUTO: 0.2 % (ref 0–0.6)
KETONES UR QL STRIP: NEGATIVE
LEUKOCYTE ESTERASE UR QL STRIP.AUTO: ABNORMAL
LYMPHOCYTES # BLD AUTO: 1.85 10*3/MM3 (ref 0.6–4.8)
LYMPHOCYTES NFR BLD AUTO: 15.1 % (ref 24–44)
MAGNESIUM SERPL-MCNC: 2 MG/DL (ref 1.3–2.7)
MCH RBC QN AUTO: 29.8 PG (ref 27–31)
MCHC RBC AUTO-ENTMCNC: 34.6 G/DL (ref 32–36)
MCV RBC AUTO: 86.2 FL (ref 80–99)
MONOCYTES # BLD AUTO: 0.53 10*3/MM3 (ref 0–1)
MONOCYTES NFR BLD AUTO: 4.3 % (ref 0–12)
NEUTROPHILS # BLD AUTO: 9.79 10*3/MM3 (ref 1.5–8.3)
NEUTROPHILS NFR BLD AUTO: 79.9 % (ref 41–71)
NITRITE UR QL STRIP: NEGATIVE
PH UR STRIP.AUTO: 7.5 [PH] (ref 5–8)
PLATELET # BLD AUTO: 352 10*3/MM3 (ref 150–450)
PMV BLD AUTO: 10.7 FL (ref 6–12)
POTASSIUM BLD-SCNC: 3.5 MMOL/L (ref 3.5–5.5)
PROT SERPL-MCNC: 8.1 G/DL (ref 5.7–8.2)
PROT UR QL STRIP: NEGATIVE
RBC # BLD AUTO: 4.7 10*6/MM3 (ref 3.89–5.14)
RBC # UR: NORMAL /HPF
REF LAB TEST METHOD: NORMAL
SODIUM BLD-SCNC: 138 MMOL/L (ref 132–146)
SP GR UR STRIP: 1.01 (ref 1–1.03)
SQUAMOUS #/AREA URNS HPF: NORMAL /HPF
TROPONIN I SERPL-MCNC: 0 NG/ML (ref 0–0.07)
UROBILINOGEN UR QL STRIP: ABNORMAL
WBC NRBC COR # BLD: 12.25 10*3/MM3 (ref 3.5–10.8)
WBC UR QL AUTO: NORMAL /HPF
WHOLE BLOOD HOLD SPECIMEN: NORMAL
WHOLE BLOOD HOLD SPECIMEN: NORMAL

## 2019-01-04 PROCEDURE — 83735 ASSAY OF MAGNESIUM: CPT | Performed by: EMERGENCY MEDICINE

## 2019-01-04 PROCEDURE — 71045 X-RAY EXAM CHEST 1 VIEW: CPT

## 2019-01-04 PROCEDURE — 81001 URINALYSIS AUTO W/SCOPE: CPT | Performed by: EMERGENCY MEDICINE

## 2019-01-04 PROCEDURE — 84484 ASSAY OF TROPONIN QUANT: CPT

## 2019-01-04 PROCEDURE — 96360 HYDRATION IV INFUSION INIT: CPT

## 2019-01-04 PROCEDURE — 93005 ELECTROCARDIOGRAM TRACING: CPT | Performed by: EMERGENCY MEDICINE

## 2019-01-04 PROCEDURE — 85025 COMPLETE CBC W/AUTO DIFF WBC: CPT | Performed by: EMERGENCY MEDICINE

## 2019-01-04 PROCEDURE — 99284 EMERGENCY DEPT VISIT MOD MDM: CPT

## 2019-01-04 PROCEDURE — 80053 COMPREHEN METABOLIC PANEL: CPT | Performed by: EMERGENCY MEDICINE

## 2019-01-04 PROCEDURE — 82962 GLUCOSE BLOOD TEST: CPT

## 2019-01-04 RX ORDER — SODIUM CHLORIDE 0.9 % (FLUSH) 0.9 %
10 SYRINGE (ML) INJECTION AS NEEDED
Status: DISCONTINUED | OUTPATIENT
Start: 2019-01-04 | End: 2019-01-04 | Stop reason: HOSPADM

## 2019-01-04 RX ADMIN — SODIUM CHLORIDE 1000 ML: 9 INJECTION, SOLUTION INTRAVENOUS at 12:32

## 2019-01-04 NOTE — ED PROVIDER NOTES
Subjective   Ms. Shweta Gonsales is a 53 y.o. Female who presents to the ED with c/o dizziness. She reports that for the past few days she has been feeling fatigued and been developing dizziness with lightheadedness when standing, which prompted presentation to the ED. She believes her symptoms are 2/2 hypoglycemia as she had a blood sugar of 85 after breakfast today. She indicates that she typically takes her blood sugar every morning but she has not taken it the past few days. She denies hematochezia, vomiting, diarrhea, and melena. She has a hx of DM on Januvia, CVA, HLD, HTN, and RA. Her DM is managed by her PCP, Dr. Craig. She does not smoke or drink alcohol. No other acute complaints at this time.            Review of Systems    Past Medical History:   Diagnosis Date   • Diabetes mellitus (CMS/HCC)    • Hemorrhagic stroke (CMS/HCC)    • Hyperlipidemia    • Hypertension    • Ischemic cerebrovascular accident (CVA) (CMS/HCC) 12/02/2018   • RA (rheumatoid arthritis) (CMS/HCC)    • Stroke (CMS/HCC)        No Known Allergies    Past Surgical History:   Procedure Laterality Date   • CHOLECYSTECTOMY     • FRACTURE SURGERY     • HYSTERECTOMY         No family history on file.    Social History     Socioeconomic History   • Marital status:      Spouse name: Not on file   • Number of children: Not on file   • Years of education: Not on file   • Highest education level: Not on file   Tobacco Use   • Smoking status: Never Smoker   • Smokeless tobacco: Never Used   Substance and Sexual Activity   • Alcohol use: No     Frequency: Never   • Drug use: No   • Sexual activity: Defer         Objective   Physical Exam   Constitutional: She is oriented to person, place, and time. She appears well-developed and well-nourished. No distress.   HENT:   Head: Normocephalic and atraumatic.   Nose: Nose normal.   Eyes: Conjunctivae are normal. No scleral icterus.   Neck: Normal range of motion. Neck supple.   Cardiovascular:  Normal rate, regular rhythm and normal heart sounds.   No murmur heard.  Pulmonary/Chest: Effort normal and breath sounds normal. No respiratory distress.   Musculoskeletal: Normal range of motion.   Neurological: She is alert and oriented to person, place, and time.   Skin: Skin is warm and dry. She is not diaphoretic.   Psychiatric: She has a normal mood and affect. Her behavior is normal.   Nursing note and vitals reviewed.      Procedures         ED Course  ED Course as of Jan 04 1547 Fri Jan 04, 2019   1546 Patient got up and ambulated she states she feels improved.  I think she probably a little bit dehydrated from her hydrochlorothiazide.  Most of her dizziness was associated with ongoing lying to standing.  [MARIE]      ED Course User Index  [MARIE] Mitchell Duncan PA       Recent Results (from the past 24 hour(s))   POC Glucose Once    Collection Time: 01/04/19 11:30 AM   Result Value Ref Range    Glucose 92 70 - 130 mg/dL   Comprehensive Metabolic Panel    Collection Time: 01/04/19 12:08 PM   Result Value Ref Range    Glucose 80 70 - 100 mg/dL    BUN 17 9 - 23 mg/dL    Creatinine 0.94 0.60 - 1.30 mg/dL    Sodium 138 132 - 146 mmol/L    Potassium 3.5 3.5 - 5.5 mmol/L    Chloride 100 99 - 109 mmol/L    CO2 28.0 20.0 - 31.0 mmol/L    Calcium 9.8 8.7 - 10.4 mg/dL    Total Protein 8.1 5.7 - 8.2 g/dL    Albumin 4.73 3.20 - 4.80 g/dL    ALT (SGPT) 26 7 - 40 U/L    AST (SGOT) 24 0 - 33 U/L    Alkaline Phosphatase 125 (H) 25 - 100 U/L    Total Bilirubin 0.5 0.3 - 1.2 mg/dL    eGFR  African Amer 76 >60 mL/min/1.73    Globulin 3.4 gm/dL    A/G Ratio 1.4 (L) 1.5 - 2.5 g/dL    BUN/Creatinine Ratio 18.1 7.0 - 25.0    Anion Gap 10.0 3.0 - 11.0 mmol/L   Magnesium    Collection Time: 01/04/19 12:08 PM   Result Value Ref Range    Magnesium 2.0 1.3 - 2.7 mg/dL   Light Blue Top    Collection Time: 01/04/19 12:08 PM   Result Value Ref Range    Extra Tube hold for add-on    Green Top (Gel)    Collection Time: 01/04/19 12:08  PM   Result Value Ref Range    Extra Tube Hold for add-ons.    Lavender Top    Collection Time: 01/04/19 12:08 PM   Result Value Ref Range    Extra Tube hold for add-on    Gold Top - SST    Collection Time: 01/04/19 12:08 PM   Result Value Ref Range    Extra Tube Hold for add-ons.    CBC Auto Differential    Collection Time: 01/04/19 12:08 PM   Result Value Ref Range    WBC 12.25 (H) 3.50 - 10.80 10*3/mm3    RBC 4.70 3.89 - 5.14 10*6/mm3    Hemoglobin 14.0 11.5 - 15.5 g/dL    Hematocrit 40.5 34.5 - 44.0 %    MCV 86.2 80.0 - 99.0 fL    MCH 29.8 27.0 - 31.0 pg    MCHC 34.6 32.0 - 36.0 g/dL    RDW 13.4 11.3 - 14.5 %    RDW-SD 42.3 37.0 - 54.0 fl    MPV 10.7 6.0 - 12.0 fL    Platelets 352 150 - 450 10*3/mm3    Neutrophil % 79.9 (H) 41.0 - 71.0 %    Lymphocyte % 15.1 (L) 24.0 - 44.0 %    Monocyte % 4.3 0.0 - 12.0 %    Eosinophil % 0.6 0.0 - 3.0 %    Basophil % 0.1 0.0 - 1.0 %    Immature Grans % 0.2 0.0 - 0.6 %    Neutrophils, Absolute 9.79 (H) 1.50 - 8.30 10*3/mm3    Lymphocytes, Absolute 1.85 0.60 - 4.80 10*3/mm3    Monocytes, Absolute 0.53 0.00 - 1.00 10*3/mm3    Eosinophils, Absolute 0.07 0.00 - 0.30 10*3/mm3    Basophils, Absolute 0.01 0.00 - 0.20 10*3/mm3    Immature Grans, Absolute 0.02 0.00 - 0.03 10*3/mm3   POC Troponin, Rapid    Collection Time: 01/04/19 12:11 PM   Result Value Ref Range    Troponin I 0.00 0.00 - 0.07 ng/mL   Urinalysis With Microscopic If Indicated (No Culture) - Urine, Clean Catch    Collection Time: 01/04/19  1:14 PM   Result Value Ref Range    Color, UA Yellow Yellow, Straw    Appearance, UA Clear Clear    pH, UA 7.5 5.0 - 8.0    Specific Gravity, UA 1.013 1.001 - 1.030    Glucose, UA Negative Negative    Ketones, UA Negative Negative    Bilirubin, UA Negative Negative    Blood, UA Negative Negative    Protein, UA Negative Negative    Leuk Esterase, UA Trace (A) Negative    Nitrite, UA Negative Negative    Urobilinogen, UA 0.2 E.U./dL 0.2 - 1.0 E.U./dL   Urinalysis, Microscopic Only -  "Urine, Clean Catch    Collection Time: 01/04/19  1:14 PM   Result Value Ref Range    RBC, UA 0-2 None Seen, 0-2 /HPF    WBC, UA 0-2 None Seen, 0-2 /HPF    Bacteria, UA None Seen None Seen, Trace /HPF    Squamous Epithelial Cells, UA 0-2 None Seen, 0-2 /HPF    Hyaline Casts, UA None Seen 0 - 6 /LPF    Methodology Automated Microscopy      Note: In addition to lab results from this visit, the labs listed above may include labs taken at another facility or during a different encounter within the last 24 hours. Please correlate lab times with ED admission and discharge times for further clarification of the services performed during this visit.    XR Chest 1 View   Final Result   No acute cardiopulmonary disease.       D:  01/04/2019   E:  01/04/2019       This report was finalized on 1/4/2019 3:35 PM by Dr. Brenda Cleveland MD.            Vitals:    01/04/19 1118 01/04/19 1234 01/04/19 1400 01/04/19 1430   BP:  120/65 130/83 129/81   BP Location:   Right arm    Patient Position:   Standing    Pulse:   89    Resp: 18  16    Temp:       SpO2:  97% 98% 96%   Weight: 72.6 kg (160 lb)      Height: 162.6 cm (64\")        Medications   sodium chloride 0.9 % flush 10 mL (not administered)   sodium chloride 0.9 % bolus 1,000 mL (1,000 mL Intravenous New Bag 1/4/19 1232)     ECG/EMG Results (last 24 hours)     Procedure Component Value Units Date/Time    ECG 12 Lead [[id number]] Collected:  01/04/19 1133     Updated:  01/04/19 1134                      MDM  Number of Diagnoses or Management Options  Near syncope: new and requires workup  Volume depletion: new and requires workup     Amount and/or Complexity of Data Reviewed  Clinical lab tests: reviewed and ordered  Tests in the radiology section of CPT®: reviewed and ordered  Tests in the medicine section of CPT®: ordered and reviewed  Decide to obtain previous medical records or to obtain history from someone other than the patient: yes  Discuss the patient with other " providers: yes    Patient Progress  Patient progress: stable      Final diagnoses:   Near syncope   Volume depletion       Documentation assistance provided by elizabeth Gale.  Information recorded by the elizabeth was done at my direction and has been verified and validated by me.     Ronda Gale  01/04/19 3050       Mitchell Duncan PA  01/04/19 1484

## 2019-01-14 ENCOUNTER — HOSPITAL ENCOUNTER (OUTPATIENT)
Dept: MRI IMAGING | Facility: HOSPITAL | Age: 54
Discharge: HOME | End: 2019-01-14
Attending: NEUROLOGICAL SURGERY | Admitting: NEUROLOGICAL SURGERY

## 2019-01-14 ENCOUNTER — OFFICE VISIT (OUTPATIENT)
Dept: NEUROSURGERY | Facility: CLINIC | Age: 54
End: 2019-01-14

## 2019-01-14 VITALS
BODY MASS INDEX: 27.31 KG/M2 | HEIGHT: 64 IN | SYSTOLIC BLOOD PRESSURE: 130 MMHG | TEMPERATURE: 97.1 F | WEIGHT: 160 LBS | DIASTOLIC BLOOD PRESSURE: 78 MMHG

## 2019-01-14 DIAGNOSIS — Q28.3 CAVERNOUS MALFORMATION: ICD-10-CM

## 2019-01-14 DIAGNOSIS — Q28.3 CAVERNOUS MALFORMATION: Primary | ICD-10-CM

## 2019-01-14 PROCEDURE — 0 GADOBENATE DIMEGLUMINE 529 MG/ML SOLUTION: Performed by: NEUROLOGICAL SURGERY

## 2019-01-14 PROCEDURE — 70553 MRI BRAIN STEM W/O & W/DYE: CPT

## 2019-01-14 PROCEDURE — 99213 OFFICE O/P EST LOW 20 MIN: CPT | Performed by: NEUROLOGICAL SURGERY

## 2019-01-14 PROCEDURE — A9577 INJ MULTIHANCE: HCPCS | Performed by: NEUROLOGICAL SURGERY

## 2019-01-14 RX ADMIN — GADOBENATE DIMEGLUMINE 15 ML: 529 INJECTION, SOLUTION INTRAVENOUS at 10:38

## 2019-01-14 NOTE — PROGRESS NOTES
Subjective     Chief Complaint: Intracranial hemorrhage    Patient ID: Shweta Gonsales is a 53 y.o. female is here today for follow-up.    Stroke   This is a new problem. The current episode started more than 1 month ago. The problem occurs rarely. The problem has been resolved. Associated symptoms include congestion and fatigue. Pertinent negatives include no abdominal pain, arthralgias, chest pain, chills, coughing, diaphoresis, fever, headaches, joint swelling, myalgias, nausea, neck pain, numbness, rash, sore throat, vomiting or weakness. Nothing aggravates the symptoms. She has tried nothing for the symptoms. The treatment provided significant relief.       This is a 53-year-old woman who I saw in consultation in the hospital about 6 weeks ago for a spontaneous intracranial hemorrhage.  Imaging at that time was consistent with a diagnosis of a cavernous malformation.  She presents today for routine follow-up, and to discuss the results of his surveillance MRI.  She reports near-complete resolution of her symptoms since going home.    The following portions of the patient's history were reviewed and updated as appropriate: allergies, current medications, past family history, past medical history, past social history, past surgical history and problem list.    Family history: History reviewed. No pertinent family history.    Social history:   Social History     Socioeconomic History   • Marital status:      Spouse name: Not on file   • Number of children: Not on file   • Years of education: Not on file   • Highest education level: Not on file   Social Needs   • Financial resource strain: Not on file   • Food insecurity - worry: Not on file   • Food insecurity - inability: Not on file   • Transportation needs - medical: Not on file   • Transportation needs - non-medical: Not on file   Occupational History   • Not on file   Tobacco Use   • Smoking status: Never Smoker   • Smokeless tobacco: Never Used    Substance and Sexual Activity   • Alcohol use: No     Frequency: Never   • Drug use: No   • Sexual activity: Defer   Other Topics Concern   • Not on file   Social History Narrative   • Not on file       Review of Systems   Constitutional: Positive for fatigue. Negative for activity change, appetite change, chills, diaphoresis, fever and unexpected weight change.   HENT: Positive for congestion and rhinorrhea. Negative for dental problem, drooling, ear discharge, ear pain, facial swelling, hearing loss, mouth sores, nosebleeds, postnasal drip, sinus pressure, sneezing, sore throat, tinnitus, trouble swallowing and voice change.    Eyes: Negative for photophobia, pain, discharge, redness, itching and visual disturbance.   Respiratory: Negative for apnea, cough, choking, chest tightness, shortness of breath, wheezing and stridor.    Cardiovascular: Negative for chest pain, palpitations and leg swelling.   Gastrointestinal: Negative for abdominal distention, abdominal pain, anal bleeding, blood in stool, constipation, diarrhea, nausea, rectal pain and vomiting.   Endocrine: Negative for cold intolerance, heat intolerance, polydipsia, polyphagia and polyuria.   Genitourinary: Negative for decreased urine volume, difficulty urinating, dysuria, enuresis, flank pain, frequency, genital sores, hematuria and urgency.   Musculoskeletal: Negative for arthralgias, back pain, gait problem, joint swelling, myalgias, neck pain and neck stiffness.   Skin: Negative for color change, pallor, rash and wound.   Allergic/Immunologic: Negative for environmental allergies, food allergies and immunocompromised state.   Neurological: Negative for dizziness, tremors, seizures, syncope, facial asymmetry, speech difficulty, weakness, light-headedness, numbness and headaches.   Hematological: Negative for adenopathy. Does not bruise/bleed easily.   Psychiatric/Behavioral: Negative for agitation, behavioral problems, confusion, decreased  "concentration, dysphoric mood, hallucinations, self-injury, sleep disturbance and suicidal ideas. The patient is not nervous/anxious and is not hyperactive.        Objective   Blood pressure 130/78, temperature 97.1 °F (36.2 °C), temperature source Temporal, height 162.6 cm (64.02\"), weight 72.6 kg (160 lb).  Body mass index is 27.45 kg/m².    Physical Exam   Constitutional: She is oriented to person, place, and time. She appears well-developed and well-nourished.  Non-toxic appearance. No distress.   HENT:   Head: Normocephalic and atraumatic.   Mouth/Throat: Oropharynx is clear and moist.   Eyes: EOM are normal. Pupils are equal, round, and reactive to light. Right eye exhibits no discharge. Left eye exhibits no discharge.   Neck: Phonation normal. No JVD present. No tracheal deviation present. No thyromegaly present.   Cardiovascular: Normal rate and regular rhythm.   Pulmonary/Chest: Effort normal. No stridor. No respiratory distress. She has no wheezes.   Abdominal: Soft. Normal appearance. She exhibits no distension. There is no tenderness.   Musculoskeletal: She exhibits no edema.   Muscle Group     L          R  Deltoid                5          5  Bicep                  5          5  Tricep                 5          5                       5          5  Hand IO              5          5  Hip Flexor           5          5  Knee Extensor   5          5     ADF                    5          5  APF                    5          5      Neurological: She is alert and oriented to person, place, and time. She displays normal reflexes. No cranial nerve deficit or sensory deficit. She exhibits normal muscle tone. GCS eye subscore is 4. GCS verbal subscore is 5. GCS motor subscore is 6.   Reflex Scores:       Tricep reflexes are 1+ on the right side and 1+ on the left side.       Bicep reflexes are 1+ on the right side and 1+ on the left side.       Brachioradialis reflexes are 1+ on the right side and 1+ on the " left side.       Patellar reflexes are 1+ on the right side and 1+ on the left side.       Achilles reflexes are 1+ on the right side and 1+ on the left side.  CN II-XII grossly intact.  Gait not tested     No pronator drift. FTN testing performed without dysmetria or bradykinesia.   Skin: Skin is warm and dry. She is not diaphoretic. No erythema.   Psychiatric: She has a normal mood and affect. Her speech is normal and behavior is normal. Judgment and thought content normal.   Nursing note and vitals reviewed.        Assessment/Plan     Independent Review of Radiographic Studies:      Available for my review is a MRI of the brain which was performed on 1/14/19.  This demonstrates susceptibility artifact in the left posterior thalamus consistent with a prior diagnosis of a ruptured cavernous malformation    Medical Decision Making:      This is a 53-year-old woman with a new diagnosis of a cavernous malformation status post hemorrhage about 6 weeks ago.  She is made a near-complete neurological recovery at this point.  She is cleared to drive and resume normal activities from my standpoint.  I reviewed the signs and symptoms of stroke with her, and I directed her to call 911 she thinks she is having a stroke, otherwise she can follow-up with me on an as-needed basis moving forward.    Shweta was seen today for cerebrovascular accident.    Diagnoses and all orders for this visit:    Cavernous malformation        Return if symptoms worsen or fail to improve.           This document signed by VINOD Green MD January 14, 2019 3:51 PM

## 2019-03-08 ENCOUNTER — DOCUMENTATION (OUTPATIENT)
Dept: PHYSICAL THERAPY | Facility: HOSPITAL | Age: 54
End: 2019-03-08

## 2019-03-08 NOTE — THERAPY DISCHARGE NOTE
Outpatient Physical Therapy Discharge Summary         Patient Name: Shweta Gonsales  : 1965  MRN: [id number]    Today's Date: 3/8/2019    Visit Dx:  No diagnosis found.    PT OP Goals     Row Name 19 0940          PT Short Term Goals    STG Date to Achieve  18  -KL     STG 1  Patient will report compliance with HEP.   -KL     STG 1 Progress  Met  -KL        Long Term Goals    LTG Date to Achieve  19  -KL     LTG 1  Patient will be I with HEP.   -KL     LTG 1 Progress  Met  -KL     LTG 2  Patient will return to gym on a regular basis in order to resume normal activity.   -KL     LTG 2 Progress  Met  -KL       User Key  (r) = Recorded By, (t) = Taken By, (c) = Cosigned By    Initials Name Provider Type    Margoth Villareal, PT Physical Therapist          OP PT Discharge Summary  Date of Discharge: 19  Reason for Discharge: All goals achieved  Outcomes Achieved: Able to achieve all goals within established timeline  Discharge Destination: Home with home program  Discharge Instructions/Additional Comments: Patient was placed on hold for 30 days. No phone call with the need to return. She will be discharged at this time to independent management.       Time Calculation:        Therapy Suggested Charges     Code   Minutes Charges    None                       Margoth Danielle, JESSEE  3/8/2019

## 2019-03-17 ENCOUNTER — HOSPITAL ENCOUNTER (EMERGENCY)
Facility: HOSPITAL | Age: 54
Discharge: HOME | End: 2019-03-17
Attending: EMERGENCY MEDICINE | Admitting: EMERGENCY MEDICINE

## 2019-03-17 VITALS
HEART RATE: 61 BPM | RESPIRATION RATE: 14 BRPM | OXYGEN SATURATION: 98 % | TEMPERATURE: 98 F | DIASTOLIC BLOOD PRESSURE: 78 MMHG | SYSTOLIC BLOOD PRESSURE: 142 MMHG | HEIGHT: 64 IN | WEIGHT: 160 LBS | BODY MASS INDEX: 27.31 KG/M2

## 2019-03-17 DIAGNOSIS — I10 UNCONTROLLED HYPERTENSION: Primary | ICD-10-CM

## 2019-03-17 LAB
BUN BLDA-MCNC: 16 MG/DL (ref 8–26)
CA-I BLDA-SCNC: 1.22 MMOL/L (ref 1.2–1.32)
CHLORIDE BLDA-SCNC: 102 MMOL/L (ref 98–109)
CO2 BLDA-SCNC: 27 MMOL/L (ref 24–29)
CREAT BLDA-MCNC: 0.8 MG/DL (ref 0.6–1.3)
GLUCOSE BLDC GLUCOMTR-MCNC: 115 MG/DL (ref 70–130)
HCT VFR BLDA CALC: 35 % (ref 38–51)
HGB BLDA-MCNC: 11.9 G/DL (ref 12–17)
POTASSIUM BLDA-SCNC: 3.4 MMOL/L (ref 3.5–4.9)
SODIUM BLDA-SCNC: 143 MMOL/L (ref 138–146)

## 2019-03-17 PROCEDURE — 85014 HEMATOCRIT: CPT

## 2019-03-17 PROCEDURE — 99284 EMERGENCY DEPT VISIT MOD MDM: CPT

## 2019-03-17 PROCEDURE — 80047 BASIC METABLC PNL IONIZED CA: CPT

## 2019-03-17 NOTE — ED PROVIDER NOTES
"Subjective   MsPeter Gonsales is a 53 y.o. female who presents to the ED with complaints of hypertension onset approximately 2 days ago. Patient reports she had a hemorrhagic stroke in October 2018 with left sided deficits and during this admission she was taken off Lisinopril and placed on Losartan and Amlodipine. She states since the CVA she has been experiencing recurrent episodes of hypertension and these have been more frequent in the past 2 days. She states since approximately 1100 this morning her blood pressure has been elevated, and this prompts ED visit now. She notes her chronic CVA deficits of left upper extremity and left lower extremity \"heaviness\" will intermittently exacerbate and her blood pressure will increase during these times. She also complains of mild blurred vision, however she denies slurred speech, numbness, tingling, chest pain, headache, nausea, fever, chills, and any other acute symptoms at this time. Past medical history significant for DM, hemorrhagic stroke, hyperlipidemia, hypertension, ischemic CVA, cholecystectomy, fracture surgery, hysterectomy. She is scheduled for PCP follow up on 03/19/19.           History provided by:  Patient  Hypertension   Severity:  Moderate  Onset quality:  Sudden  Duration:  2 days  Timing:  Constant  Progression:  Unchanged  Chronicity:  New  Relieved by:  Nothing  Worsened by:  Nothing  Ineffective treatments: losartan, amlodipine.  Associated symptoms: blurred vision (mild ) and weakness (heaviness left lower and upper extremity secondary to previous CVA)    Associated symptoms: no chest pain, no fever, no headaches and no nausea        Review of Systems   Constitutional: Negative for chills and fever.   Eyes: Positive for blurred vision (mild ).   Cardiovascular: Negative for chest pain.   Gastrointestinal: Negative for nausea.   Neurological: Positive for weakness (heaviness left lower and upper extremity secondary to previous CVA). Negative for " speech difficulty, numbness and headaches.   All other systems reviewed and are negative.      Past Medical History:   Diagnosis Date   • Diabetes mellitus (CMS/[facility])    • Hemorrhagic stroke (CMS/[facility])    • Hyperlipidemia    • Hypertension    • Ischemic cerebrovascular accident (CVA) (CMS/[facility]) 12/02/2018   • RA (rheumatoid arthritis) (CMS/[facility])    • Stroke (CMS/[facility])        No Known Allergies    Past Surgical History:   Procedure Laterality Date   • CHOLECYSTECTOMY     • FRACTURE SURGERY     • HYSTERECTOMY         History reviewed. No pertinent family history.    Social History     Socioeconomic History   • Marital status:      Spouse name: Not on file   • Number of children: Not on file   • Years of education: Not on file   • Highest education level: Not on file   Tobacco Use   • Smoking status: Never Smoker   • Smokeless tobacco: Never Used   Substance and Sexual Activity   • Alcohol use: No     Frequency: Never   • Drug use: No   • Sexual activity: Defer         Objective   Physical Exam   Constitutional: She is oriented to person, place, and time. She appears well-developed and well-nourished. No distress.   HENT:   Head: Normocephalic and atraumatic.   Right Ear: External ear normal.   Left Ear: External ear normal.   Nose: Nose normal.   Eyes: Conjunctivae and EOM are normal. Pupils are equal, round, and reactive to light. No scleral icterus.   Neck: Normal range of motion. Neck supple.   Cardiovascular: Normal rate, regular rhythm and normal heart sounds. Exam reveals no gallop and no friction rub.   No murmur heard.  Pulmonary/Chest: Effort normal and breath sounds normal. No respiratory distress. She has no wheezes. She has no rales. She exhibits no tenderness.   Abdominal: Soft. Bowel sounds are normal. There is no tenderness. There is no rebound and no guarding.   Musculoskeletal: Normal range of motion.   Neurological: She is alert and oriented to person, place, and time. No cranial nerve deficit. She  exhibits normal muscle tone. Coordination normal.   Symmetric strength.  Speech fluent and articulate.   Skin: Skin is warm and dry. She is not diaphoretic.   Psychiatric: She has a normal mood and affect. Her behavior is normal.   Nursing note and vitals reviewed.      Procedures         ED Course     Cr benign.  BP improved here without intervention.  Recommend she increase amlodipine for now, she has PCP appt in three days and should talk to PCP  About regimen, likely needs a third agent.  No evidence of CVA today.  Patient stable on serial rechecks.  Discussed findings, concerns, plan of care, expected course, reasons to return and followup.                MDM  Number of Diagnoses or Management Options  Uncontrolled hypertension:      Amount and/or Complexity of Data Reviewed  Clinical lab tests: ordered and reviewed        Final diagnoses:   Uncontrolled hypertension       Documentation assistance provided by elizabeth Rm.  Information recorded by the scribe was done at my direction and has been verified and validated by me.     Alexys Rm  03/17/19 2024       Chaitanya River MD  03/18/19 0021

## 2019-05-22 ENCOUNTER — HOSPITAL ENCOUNTER (EMERGENCY)
Facility: HOSPITAL | Age: 54
Discharge: HOME | End: 2019-05-22
Attending: EMERGENCY MEDICINE | Admitting: EMERGENCY MEDICINE

## 2019-05-22 ENCOUNTER — APPOINTMENT (OUTPATIENT)
Dept: GENERAL RADIOLOGY | Facility: HOSPITAL | Age: 54
End: 2019-05-22

## 2019-05-22 ENCOUNTER — APPOINTMENT (OUTPATIENT)
Dept: CARDIOLOGY | Facility: HOSPITAL | Age: 54
End: 2019-05-22

## 2019-05-22 VITALS
RESPIRATION RATE: 18 BRPM | SYSTOLIC BLOOD PRESSURE: 162 MMHG | TEMPERATURE: 97.6 F | BODY MASS INDEX: 28.51 KG/M2 | HEIGHT: 64 IN | WEIGHT: 167 LBS | OXYGEN SATURATION: 100 % | DIASTOLIC BLOOD PRESSURE: 85 MMHG | HEART RATE: 72 BPM

## 2019-05-22 DIAGNOSIS — M79.605 LEFT LEG PAIN: Primary | ICD-10-CM

## 2019-05-22 LAB
BH CV LOWER VASCULAR LEFT COMMON FEMORAL AUGMENT: NORMAL
BH CV LOWER VASCULAR LEFT COMMON FEMORAL COMPRESS: NORMAL
BH CV LOWER VASCULAR LEFT COMMON FEMORAL PHASIC: NORMAL
BH CV LOWER VASCULAR LEFT COMMON FEMORAL SPONT: NORMAL
BH CV LOWER VASCULAR LEFT DISTAL FEMORAL AUGMENT: NORMAL
BH CV LOWER VASCULAR LEFT DISTAL FEMORAL COMPRESS: NORMAL
BH CV LOWER VASCULAR LEFT DISTAL FEMORAL PHASIC: NORMAL
BH CV LOWER VASCULAR LEFT DISTAL FEMORAL SPONT: NORMAL
BH CV LOWER VASCULAR LEFT GASTRONEMIUS COMPRESS: NORMAL
BH CV LOWER VASCULAR LEFT GREATER SAPH AK COMPRESS: NORMAL
BH CV LOWER VASCULAR LEFT GREATER SAPH BK COMPRESS: NORMAL
BH CV LOWER VASCULAR LEFT LESSER SAPH COMPRESS: NORMAL
BH CV LOWER VASCULAR LEFT MID FEMORAL AUGMENT: NORMAL
BH CV LOWER VASCULAR LEFT MID FEMORAL COMPRESS: NORMAL
BH CV LOWER VASCULAR LEFT MID FEMORAL PHASIC: NORMAL
BH CV LOWER VASCULAR LEFT MID FEMORAL SPONT: NORMAL
BH CV LOWER VASCULAR LEFT PERONEAL COMPRESS: NORMAL
BH CV LOWER VASCULAR LEFT POPLITEAL AUGMENT: NORMAL
BH CV LOWER VASCULAR LEFT POPLITEAL COMPRESS: NORMAL
BH CV LOWER VASCULAR LEFT POPLITEAL PHASIC: NORMAL
BH CV LOWER VASCULAR LEFT POPLITEAL SPONT: NORMAL
BH CV LOWER VASCULAR LEFT POSTERIOR TIBIAL COMPRESS: NORMAL
BH CV LOWER VASCULAR LEFT PROFUNDA FEMORAL AUGMENT: NORMAL
BH CV LOWER VASCULAR LEFT PROFUNDA FEMORAL COMPRESS: NORMAL
BH CV LOWER VASCULAR LEFT PROFUNDA FEMORAL PHASIC: NORMAL
BH CV LOWER VASCULAR LEFT PROFUNDA FEMORAL SPONT: NORMAL
BH CV LOWER VASCULAR LEFT PROXIMAL FEMORAL AUGMENT: NORMAL
BH CV LOWER VASCULAR LEFT PROXIMAL FEMORAL COMPRESS: NORMAL
BH CV LOWER VASCULAR LEFT PROXIMAL FEMORAL PHASIC: NORMAL
BH CV LOWER VASCULAR LEFT PROXIMAL FEMORAL SPONT: NORMAL
BH CV LOWER VASCULAR LEFT SAPHENOFEMORAL JUNCTION AUGMENT: NORMAL
BH CV LOWER VASCULAR LEFT SAPHENOFEMORAL JUNCTION COMPRESS: NORMAL
BH CV LOWER VASCULAR LEFT SAPHENOFEMORAL JUNCTION PHASIC: NORMAL
BH CV LOWER VASCULAR LEFT SAPHENOFEMORAL JUNCTION SPONT: NORMAL
BH CV LOWER VASCULAR RIGHT COMMON FEMORAL AUGMENT: NORMAL
BH CV LOWER VASCULAR RIGHT COMMON FEMORAL COMPRESS: NORMAL
BH CV LOWER VASCULAR RIGHT COMMON FEMORAL PHASIC: NORMAL
BH CV LOWER VASCULAR RIGHT COMMON FEMORAL SPONT: NORMAL

## 2019-05-22 PROCEDURE — 99283 EMERGENCY DEPT VISIT LOW MDM: CPT

## 2019-05-22 PROCEDURE — 73560 X-RAY EXAM OF KNEE 1 OR 2: CPT

## 2019-05-22 PROCEDURE — 93971 EXTREMITY STUDY: CPT

## 2019-05-22 PROCEDURE — 93971 EXTREMITY STUDY: CPT | Performed by: INTERNAL MEDICINE

## 2019-06-09 ENCOUNTER — HOSPITAL ENCOUNTER (EMERGENCY)
Facility: HOSPITAL | Age: 54
Discharge: HOME | End: 2019-06-10
Attending: EMERGENCY MEDICINE | Admitting: EMERGENCY MEDICINE

## 2019-06-09 DIAGNOSIS — M62.830 MUSCLE SPASM OF BACK: Primary | ICD-10-CM

## 2019-06-09 PROCEDURE — 99284 EMERGENCY DEPT VISIT MOD MDM: CPT

## 2019-06-10 ENCOUNTER — APPOINTMENT (OUTPATIENT)
Dept: CT IMAGING | Facility: HOSPITAL | Age: 54
End: 2019-06-10

## 2019-06-10 ENCOUNTER — APPOINTMENT (OUTPATIENT)
Dept: GENERAL RADIOLOGY | Facility: HOSPITAL | Age: 54
End: 2019-06-10

## 2019-06-10 VITALS
HEART RATE: 81 BPM | TEMPERATURE: 97.9 F | OXYGEN SATURATION: 98 % | BODY MASS INDEX: 28.17 KG/M2 | RESPIRATION RATE: 18 BRPM | SYSTOLIC BLOOD PRESSURE: 135 MMHG | WEIGHT: 165 LBS | DIASTOLIC BLOOD PRESSURE: 68 MMHG | HEIGHT: 64 IN

## 2019-06-10 LAB
ALBUMIN SERPL-MCNC: 4.1 G/DL (ref 3.5–5.2)
ALBUMIN/GLOB SERPL: 1.1 G/DL
ALP SERPL-CCNC: 113 U/L (ref 39–117)
ALT SERPL W P-5'-P-CCNC: 18 U/L (ref 1–33)
ANION GAP SERPL CALCULATED.3IONS-SCNC: 13 MMOL/L
AST SERPL-CCNC: 21 U/L (ref 1–32)
BACTERIA UR QL AUTO: ABNORMAL /HPF
BASOPHILS # BLD AUTO: 0.03 10*3/MM3 (ref 0–0.2)
BASOPHILS NFR BLD AUTO: 0.3 % (ref 0–1.5)
BILIRUB SERPL-MCNC: 0.3 MG/DL (ref 0.2–1.2)
BILIRUB UR QL STRIP: NEGATIVE
BUN BLD-MCNC: 20 MG/DL (ref 6–20)
BUN/CREAT SERPL: 25.3 (ref 7–25)
CALCIUM SPEC-SCNC: 9.4 MG/DL (ref 8.6–10.5)
CHLORIDE SERPL-SCNC: 101 MMOL/L (ref 98–107)
CLARITY UR: ABNORMAL
CO2 SERPL-SCNC: 26 MMOL/L (ref 22–29)
COLOR UR: YELLOW
CREAT BLD-MCNC: 0.79 MG/DL (ref 0.57–1)
DEPRECATED RDW RBC AUTO: 39.8 FL (ref 37–54)
EOSINOPHIL # BLD AUTO: 0.09 10*3/MM3 (ref 0–0.4)
EOSINOPHIL NFR BLD AUTO: 0.8 % (ref 0.3–6.2)
ERYTHROCYTE [DISTWIDTH] IN BLOOD BY AUTOMATED COUNT: 12.8 % (ref 12.3–15.4)
GFR SERPL CREATININE-BSD FRML MDRD: 93 ML/MIN/1.73
GLOBULIN UR ELPH-MCNC: 3.7 GM/DL
GLUCOSE BLD-MCNC: 141 MG/DL (ref 65–99)
GLUCOSE UR STRIP-MCNC: NEGATIVE MG/DL
HCT VFR BLD AUTO: 36 % (ref 34–46.6)
HGB BLD-MCNC: 12.2 G/DL (ref 12–15.9)
HGB UR QL STRIP.AUTO: NEGATIVE
HYALINE CASTS UR QL AUTO: ABNORMAL /LPF
IMM GRANULOCYTES # BLD AUTO: 0.03 10*3/MM3 (ref 0–0.05)
IMM GRANULOCYTES NFR BLD AUTO: 0.3 % (ref 0–0.5)
KETONES UR QL STRIP: NEGATIVE
LEUKOCYTE ESTERASE UR QL STRIP.AUTO: ABNORMAL
LYMPHOCYTES # BLD AUTO: 2.72 10*3/MM3 (ref 0.7–3.1)
LYMPHOCYTES NFR BLD AUTO: 24.2 % (ref 19.6–45.3)
MCH RBC QN AUTO: 29.1 PG (ref 26.6–33)
MCHC RBC AUTO-ENTMCNC: 33.9 G/DL (ref 31.5–35.7)
MCV RBC AUTO: 85.9 FL (ref 79–97)
MONOCYTES # BLD AUTO: 0.63 10*3/MM3 (ref 0.1–0.9)
MONOCYTES NFR BLD AUTO: 5.6 % (ref 5–12)
NEUTROPHILS # BLD AUTO: 7.76 10*3/MM3 (ref 1.7–7)
NEUTROPHILS NFR BLD AUTO: 68.8 % (ref 42.7–76)
NITRITE UR QL STRIP: NEGATIVE
NRBC BLD AUTO-RTO: 0 /100 WBC (ref 0–0.2)
PH UR STRIP.AUTO: <=5 [PH] (ref 5–8)
PLATELET # BLD AUTO: 325 10*3/MM3 (ref 140–450)
PMV BLD AUTO: 10.5 FL (ref 6–12)
POTASSIUM BLD-SCNC: 3.8 MMOL/L (ref 3.5–5.2)
PROT SERPL-MCNC: 7.8 G/DL (ref 6–8.5)
PROT UR QL STRIP: ABNORMAL
RBC # BLD AUTO: 4.19 10*6/MM3 (ref 3.77–5.28)
RBC # UR: ABNORMAL /HPF
REF LAB TEST METHOD: ABNORMAL
SODIUM BLD-SCNC: 140 MMOL/L (ref 136–145)
SP GR UR STRIP: 1.02 (ref 1–1.03)
SQUAMOUS #/AREA URNS HPF: ABNORMAL /HPF
UROBILINOGEN UR QL STRIP: ABNORMAL
WBC NRBC COR # BLD: 11.26 10*3/MM3 (ref 3.4–10.8)
WBC UR QL AUTO: ABNORMAL /HPF

## 2019-06-10 PROCEDURE — 25010000002 KETOROLAC TROMETHAMINE PER 15 MG: Performed by: EMERGENCY MEDICINE

## 2019-06-10 PROCEDURE — 25010000002 HYDROMORPHONE 1 MG/ML SOLUTION: Performed by: EMERGENCY MEDICINE

## 2019-06-10 PROCEDURE — 85025 COMPLETE CBC W/AUTO DIFF WBC: CPT | Performed by: EMERGENCY MEDICINE

## 2019-06-10 PROCEDURE — 81001 URINALYSIS AUTO W/SCOPE: CPT | Performed by: EMERGENCY MEDICINE

## 2019-06-10 PROCEDURE — 74176 CT ABD & PELVIS W/O CONTRAST: CPT

## 2019-06-10 PROCEDURE — 96372 THER/PROPH/DIAG INJ SC/IM: CPT

## 2019-06-10 PROCEDURE — 80053 COMPREHEN METABOLIC PANEL: CPT | Performed by: EMERGENCY MEDICINE

## 2019-06-10 PROCEDURE — 71045 X-RAY EXAM CHEST 1 VIEW: CPT

## 2019-06-10 PROCEDURE — 96374 THER/PROPH/DIAG INJ IV PUSH: CPT

## 2019-06-10 RX ORDER — ONDANSETRON 4 MG/1
4 TABLET, ORALLY DISINTEGRATING ORAL EVERY 6 HOURS PRN
Qty: 20 TABLET | Refills: 0 | Status: SHIPPED | OUTPATIENT
Start: 2019-06-10 | End: 2019-06-15

## 2019-06-10 RX ORDER — SODIUM CHLORIDE 0.9 % (FLUSH) 0.9 %
10 SYRINGE (ML) INJECTION AS NEEDED
Status: DISCONTINUED | OUTPATIENT
Start: 2019-06-10 | End: 2019-06-10 | Stop reason: HOSPADM

## 2019-06-10 RX ORDER — OXYCODONE HYDROCHLORIDE AND ACETAMINOPHEN 5; 325 MG/1; MG/1
1 TABLET ORAL EVERY 4 HOURS PRN
Qty: 15 TABLET | Refills: 0 | Status: SHIPPED | OUTPATIENT
Start: 2019-06-10

## 2019-06-10 RX ORDER — KETOROLAC TROMETHAMINE 15 MG/ML
15 INJECTION, SOLUTION INTRAMUSCULAR; INTRAVENOUS ONCE
Status: COMPLETED | OUTPATIENT
Start: 2019-06-10 | End: 2019-06-10

## 2019-06-10 RX ADMIN — KETOROLAC TROMETHAMINE 15 MG: 15 INJECTION, SOLUTION INTRAMUSCULAR; INTRAVENOUS at 02:03

## 2019-06-10 RX ADMIN — HYDROMORPHONE HYDROCHLORIDE 1 MG: 1 INJECTION, SOLUTION INTRAMUSCULAR; INTRAVENOUS; SUBCUTANEOUS at 00:23

## 2019-06-10 NOTE — DISCHARGE INSTRUCTIONS
The patient is advised to perform regular stretching and light activity.  Apply heat to the area of pain.    Take Percocet as needed to help with pain.    Follow-up with primary care physician for recheck in 2 days.

## 2019-06-10 NOTE — ED PROVIDER NOTES
Subjective   Ms. Shweta Gonsales is a 53 y.o. female who presents to the ED with c/o back pain. She reports the pain is located in her mid to lower right back and onset suddenly 1 hour ago. Her pain is severe. She reports she finds relief in a specific position and movement exacerbates her pain. Her pain does not radiate. She denies any recent injury, twisting, stretching, or lifting heavy objects. She notes her urine has been darker than normal for 6 days. She denies abdominal pain, chest pain, shortness of breath, fever, dysuria, urinary frequency, urinary urgency, hematuria, bloody stool, constipation, or diarrhea. She denies any similar prior episodes. She reports history of cholecystectomy. There are no other acute symptoms at this time.        History provided by:  Patient  Back Pain   Pain location: Mid to lower right back.  Radiates to:  Does not radiate  Pain severity:  Severe  Onset quality:  Sudden  Duration:  1 hour  Context: not lifting heavy objects, not recent injury and not twisting    Relieved by:  None tried  Worsened by:  Movement  Ineffective treatments:  None tried  Associated symptoms: no abdominal pain, no chest pain, no dysuria and no fever        Review of Systems   Constitutional: Negative for fever.   Respiratory: Negative for shortness of breath.    Cardiovascular: Negative for chest pain.   Gastrointestinal: Negative for abdominal pain, blood in stool, constipation and diarrhea.   Genitourinary: Negative for dysuria, frequency, hematuria and urgency.        Dark urine for 6 days   Musculoskeletal: Positive for back pain.   All other systems reviewed and are negative.      Past Medical History:   Diagnosis Date   • Diabetes mellitus (CMS/HCC)    • Hemorrhagic stroke (CMS/[facility])    • Hyperlipidemia    • Hypertension    • Ischemic cerebrovascular accident (CVA) (CMS/HCC) 12/02/2018   • RA (rheumatoid arthritis) (CMS/HCC)    • Stroke (CMS/HCC)        No Known Allergies    Past Surgical History:    Procedure Laterality Date   • CHOLECYSTECTOMY     • FRACTURE SURGERY     • HYSTERECTOMY         History reviewed. No pertinent family history.    Social History     Socioeconomic History   • Marital status:      Spouse name: Not on file   • Number of children: Not on file   • Years of education: Not on file   • Highest education level: Not on file   Tobacco Use   • Smoking status: Never Smoker   • Smokeless tobacco: Never Used   Substance and Sexual Activity   • Alcohol use: No     Frequency: Never   • Drug use: No   • Sexual activity: Defer         Objective   Physical Exam   Constitutional: She is oriented to person, place, and time. She appears well-developed and well-nourished. No distress.   She appears uncomfortable, she expressed pain with movement, when trying to sit up or move.   HENT:   Head: Normocephalic and atraumatic.   Nose: Nose normal.   Eyes: Conjunctivae are normal. No scleral icterus.   Neck: Normal range of motion. Neck supple.   Cardiovascular: Normal rate, regular rhythm and normal heart sounds.   No murmur heard.  Pulmonary/Chest: Effort normal and breath sounds normal. No respiratory distress.   Abdominal: Soft. There is no tenderness.   Musculoskeletal: Normal range of motion. She exhibits no edema or tenderness.   No tenderness to palpation or percussion over the back.   Neurological: She is alert and oriented to person, place, and time.   Normal intact neurovascularly thorughout the lower extremities.   Skin: Skin is warm and dry.   Psychiatric: She has a normal mood and affect. Her behavior is normal.   Nursing note and vitals reviewed.      Procedures         ED Course  ED Course as of Ottoniel 10 0421   Mon Ottoniel 10, 2019   0218 Color, UA: Yellow [JW]      ED Course User Index  [JW] Davdi Han     Recent Results (from the past 24 hour(s))   Urinalysis With Microscopic If Indicated (No Culture) - Urine, Clean Catch    Collection Time: 06/10/19 12:02 AM   Result Value Ref Range     Color, UA Yellow Yellow, Straw    Appearance, UA Cloudy (A) Clear    pH, UA <=5.0 5.0 - 8.0    Specific Gravity, UA 1.025 1.001 - 1.030    Glucose, UA Negative Negative    Ketones, UA Negative Negative    Bilirubin, UA Negative Negative    Blood, UA Negative Negative    Protein, UA Trace (A) Negative    Leuk Esterase, UA Moderate (2+) (A) Negative    Nitrite, UA Negative Negative    Urobilinogen, UA 0.2 E.U./dL 0.2 - 1.0 E.U./dL   Urinalysis, Microscopic Only - Urine, Clean Catch    Collection Time: 06/10/19 12:02 AM   Result Value Ref Range    RBC, UA 0-2 None Seen, 0-2 /HPF    WBC, UA 21-30 (A) None Seen, 0-2 /HPF    Bacteria, UA None Seen None Seen, Trace /HPF    Squamous Epithelial Cells, UA 3-6 (A) None Seen, 0-2 /HPF    Hyaline Casts, UA 0-6 0 - 6 /LPF    Methodology Automated Microscopy    Comprehensive Metabolic Panel    Collection Time: 06/10/19  2:02 AM   Result Value Ref Range    Glucose 141 (H) 65 - 99 mg/dL    BUN 20 6 - 20 mg/dL    Creatinine 0.79 0.57 - 1.00 mg/dL    Sodium 140 136 - 145 mmol/L    Potassium 3.8 3.5 - 5.2 mmol/L    Chloride 101 98 - 107 mmol/L    CO2 26.0 22.0 - 29.0 mmol/L    Calcium 9.4 8.6 - 10.5 mg/dL    Total Protein 7.8 6.0 - 8.5 g/dL    Albumin 4.10 3.50 - 5.20 g/dL    ALT (SGPT) 18 1 - 33 U/L    AST (SGOT) 21 1 - 32 U/L    Alkaline Phosphatase 113 39 - 117 U/L    Total Bilirubin 0.3 0.2 - 1.2 mg/dL    eGFR  African Amer 93 >60 mL/min/1.73    Globulin 3.7 gm/dL    A/G Ratio 1.1 g/dL    BUN/Creatinine Ratio 25.3 (H) 7.0 - 25.0    Anion Gap 13.0 mmol/L   CBC Auto Differential    Collection Time: 06/10/19  2:02 AM   Result Value Ref Range    WBC 11.26 (H) 3.40 - 10.80 10*3/mm3    RBC 4.19 3.77 - 5.28 10*6/mm3    Hemoglobin 12.2 12.0 - 15.9 g/dL    Hematocrit 36.0 34.0 - 46.6 %    MCV 85.9 79.0 - 97.0 fL    MCH 29.1 26.6 - 33.0 pg    MCHC 33.9 31.5 - 35.7 g/dL    RDW 12.8 12.3 - 15.4 %    RDW-SD 39.8 37.0 - 54.0 fl    MPV 10.5 6.0 - 12.0 fL    Platelets 325 140 - 450 10*3/mm3     "Neutrophil % 68.8 42.7 - 76.0 %    Lymphocyte % 24.2 19.6 - 45.3 %    Monocyte % 5.6 5.0 - 12.0 %    Eosinophil % 0.8 0.3 - 6.2 %    Basophil % 0.3 0.0 - 1.5 %    Immature Grans % 0.3 0.0 - 0.5 %    Neutrophils, Absolute 7.76 (H) 1.70 - 7.00 10*3/mm3    Lymphocytes, Absolute 2.72 0.70 - 3.10 10*3/mm3    Monocytes, Absolute 0.63 0.10 - 0.90 10*3/mm3    Eosinophils, Absolute 0.09 0.00 - 0.40 10*3/mm3    Basophils, Absolute 0.03 0.00 - 0.20 10*3/mm3    Immature Grans, Absolute 0.03 0.00 - 0.05 10*3/mm3    nRBC 0.0 0.0 - 0.2 /100 WBC     Note: In addition to lab results from this visit, the labs listed above may include labs taken at another facility or during a different encounter within the last 24 hours. Please correlate lab times with ED admission and discharge times for further clarification of the services performed during this visit.    XR Chest 1 View   Final Result      1.  Normal chest radiograph.      Signer Name: Edward Garrison MD    Signed: 6/10/2019 1:59 AM    Workstation Name: "[id number]          CT Abdomen Pelvis Without Contrast   Final Result   1.  No acute abnormality.      Signer Name: Edward Garrison MD    Signed: 6/10/2019 1:56 AM    Workstation Name: [id number]            Vitals:    06/09/19 2358 06/10/19 0001 06/10/19 0130   BP:  145/85 147/62   BP Location:  Right arm    Patient Position:  Lying    Pulse:  81    Resp:  18    Temp:  97.9 °F (36.6 °C)    TempSrc:  Oral    SpO2:  98% 98%   Weight: 74.8 kg (165 lb)     Height: 162.6 cm (64\")       Medications   HYDROmorphone (DILAUDID) injection 1 mg ( Intravenous Canceled Entry 6/10/19 0050)   sodium chloride 0.9 % flush 10 mL (not administered)   HYDROmorphone (DILAUDID) injection 1 mg (1 mg Intramuscular Given 6/10/19 0023)   ketorolac (TORADOL) injection 15 mg (15 mg Intravenous Given 6/10/19 0203)     ECG/EMG Results (last 24 hours)     ** No results found for the last 24 hours. **        No orders to display               "         MDM  Number of Diagnoses or Management Options  Muscle spasm of back: new and requires workup  Diagnosis management comments: Patient initially presented with complaint of right low back pain that began as she was walking through her house.    This complaint seem to be most consistent with muscle spasm.  However after the administration of pain medication the patient appeared to be tachypneic.  It was difficult to discern exactly what was contributing to this.  She also began to complain of some abdominal pain.    Therefore CT scan of the abdomen pelvis performed, and chest x-ray was performed along with basic laboratory evaluation.    No significant antibodies were identified on chest x-ray, CT scan of the abdomen pelvis, or laboratory evaluation.    The patient's urine showed moderate leuk esterase, but was negative for bacteria.  The patient is afebrile and denies any dysuria, frequency, or urgency.  I will not initiate antibiotics for urinary tract infection at this time.    Patient will be discharged with the advised to apply heat to the back and perform regular stretching.  She will be given pain medication to help with the back pain.    Discharged home appearing well with stable vital signs.       Amount and/or Complexity of Data Reviewed  Clinical lab tests: ordered and reviewed  Tests in the radiology section of CPT®: ordered and reviewed  Obtain history from someone other than the patient: yes  Review and summarize past medical records: yes  Independent visualization of images, tracings, or specimens: yes        Final diagnoses:   Muscle spasm of back       Documentation assistance provided by elizabeth Han.  Information recorded by the elizabeth was done at my direction and has been verified and validated by me.     David Han  06/10/19 0019       David Han  06/10/19 0343       Edda Valencia MD  06/10/19 1231

## 2021-07-09 ENCOUNTER — APPOINTMENT (OUTPATIENT)
Dept: CT IMAGING | Age: 56
End: 2021-07-09
Payer: COMMERCIAL

## 2021-07-09 ENCOUNTER — HOSPITAL ENCOUNTER (EMERGENCY)
Age: 56
Discharge: HOME | End: 2021-07-09
Attending: EMERGENCY MEDICINE
Payer: COMMERCIAL

## 2021-07-09 VITALS
WEIGHT: 179.5 LBS | SYSTOLIC BLOOD PRESSURE: 140 MMHG | HEART RATE: 77 BPM | OXYGEN SATURATION: 98 % | BODY MASS INDEX: 30.64 KG/M2 | RESPIRATION RATE: 16 BRPM | DIASTOLIC BLOOD PRESSURE: 84 MMHG | HEIGHT: 64 IN | TEMPERATURE: 98.4 F

## 2021-07-09 DIAGNOSIS — R51.9 ACUTE NONINTRACTABLE HEADACHE, UNSPECIFIED HEADACHE TYPE: Primary | ICD-10-CM

## 2021-07-09 PROCEDURE — 99284 EMERGENCY DEPT VISIT MOD MDM: CPT

## 2021-07-09 PROCEDURE — 6370000000 HC RX 637 (ALT 250 FOR IP): Performed by: EMERGENCY MEDICINE

## 2021-07-09 PROCEDURE — 70450 CT HEAD/BRAIN W/O DYE: CPT

## 2021-07-09 RX ORDER — PROCHLORPERAZINE MALEATE 10 MG
10 TABLET ORAL ONCE
Status: COMPLETED | OUTPATIENT
Start: 2021-07-09 | End: 2021-07-09

## 2021-07-09 RX ORDER — HYDROCHLOROTHIAZIDE 12.5 MG/1
12.5 CAPSULE, GELATIN COATED ORAL DAILY
COMMUNITY

## 2021-07-09 RX ORDER — PROCHLORPERAZINE MALEATE 10 MG
10 TABLET ORAL EVERY 6 HOURS PRN
Qty: 20 TABLET | Refills: 0 | Status: SHIPPED | OUTPATIENT
Start: 2021-07-09

## 2021-07-09 RX ORDER — DIPHENHYDRAMINE HCL 25 MG
25 TABLET ORAL ONCE
Status: COMPLETED | OUTPATIENT
Start: 2021-07-09 | End: 2021-07-09

## 2021-07-09 RX ORDER — AMLODIPINE BESYLATE 5 MG/1
5 TABLET ORAL DAILY
COMMUNITY

## 2021-07-09 RX ORDER — ACETAMINOPHEN 325 MG/1
650 TABLET ORAL ONCE
Status: COMPLETED | OUTPATIENT
Start: 2021-07-09 | End: 2021-07-09

## 2021-07-09 RX ORDER — METOCLOPRAMIDE 10 MG/1
10 TABLET ORAL ONCE
Status: DISCONTINUED | OUTPATIENT
Start: 2021-07-09 | End: 2021-07-09

## 2021-07-09 RX ORDER — GLIPIZIDE 2.5 MG/1
2.5 TABLET, EXTENDED RELEASE ORAL DAILY
COMMUNITY

## 2021-07-09 RX ADMIN — ACETAMINOPHEN 650 MG: 325 TABLET ORAL at 05:39

## 2021-07-09 RX ADMIN — PROCHLORPERAZINE MALEATE 10 MG: 10 TABLET ORAL at 05:40

## 2021-07-09 RX ADMIN — DIPHENHYDRAMINE HYDROCHLORIDE 25 MG: 25 TABLET ORAL at 05:40

## 2021-07-09 ASSESSMENT — PAIN DESCRIPTION - LOCATION: LOCATION: HEAD

## 2021-07-09 ASSESSMENT — PAIN DESCRIPTION - PAIN TYPE: TYPE: ACUTE PAIN

## 2021-07-09 ASSESSMENT — PAIN SCALES - GENERAL
PAINLEVEL_OUTOF10: 7
PAINLEVEL_OUTOF10: 7

## 2021-07-09 ASSESSMENT — PAIN DESCRIPTION - DESCRIPTORS: DESCRIPTORS: SHARP;PRESSURE

## 2021-07-09 NOTE — ED PROVIDER NOTES
[address] [facility]  Department of Emergency Medicine   ED  Encounter Note  Admit Date/RoomTime: 2021  4:54 AM  ED Room: 10/10    NAME: Laurita Gupta  : 1965  MRN: [id number]     Chief Complaint:  Headache (began 3 days ago with headache above left eyebrow.)    History of Present Illness      Laurita Gupta is a 48 y.o. old female who presents to the emergency department by private vehicle, for complaint of gradual onset, waxing and waning episodes left-sided unilateral aching, throbbing pain which began 3 day(s) prior to arrival.  Since onset the symptoms have been waxing and waning and moderate in severity. The patient has history of Previous left-sided hemorrhagic stroke with cavernous malformation. Patient could not state when however Care Everywhere states her hemorrhagic stroke was in 2018  . Today's episode is somewhat typical for her. She has had CT and MRI in the past. The pain is associated with nausea and negative for numbness, weakness, speech or visual changes. The symptoms are aggravated by nothing in particular and relieved by nothing. There has been no history of recent trauma . Treatment prior to arrival consisted of: acetaminophen with no relief of symptoms. She takes no blood thinning agents. Patient reports history of stroke several years ago. She could not give a timeline however care everywhere shows hemorrhagic stroke 10/18 and well as ischemia CVA . She states she has been doing well until May of this year when she had left-sided headache and right arm heaviness. 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 She is from [place], was in [facility] at that time, was observed in the hospital and had MRI and MRA which showed chronic findings but no acute findings. She states she has been doing well until 3 days ago when she started to have intermittent left-sided headache. Denies any new numbness tingling weakness.   States it \"feels like sinuses\". She denies anosmia or ageusia. Has been vaccinated against COVID-19. ROS   Pertinent positives and negatives are stated within HPI, all other systems reviewed and are negative. Past Medical History:  has no past medical history on file. Surgical History:  has no past surgical history on file. Social History:  reports that she has never smoked. She has never used smokeless tobacco.    Family History: family history is not on file. Allergies: Patient has no known allergies. Physical Exam   Oxygen Saturation Interpretation: Normal.        ED Triage Vitals [07/09/21 0506]   BP Temp Temp src Pulse Resp SpO2 Height Weight   (!) 146/82 98.4 °F (36.9 °C) -- 71 16 98 % 5' 4\" (1.626 m) 179 lb 8 oz (81.4 kg)          Constitutional:  Alert, development consistent with age. HEENT:  NC/NT. PERRLA. Airway patent. There is no temporal artery tenderness bilaterally. Pupils are 6 mm react bilaterally. There is no nystagmus. Neck:  Normal ROM. Supple. No meningeal signs  Respiratory:  Clear to auscultation and breath sounds equal.  CV:  Regular rate and rhythm, normal heart sounds, without pathological murmurs, ectopy, gallops, or rubs. GI:  Abdomen Soft, nontender, good bowel sounds. No firm or pulsatile mass. Back:  No costovertebral tenderness. Extremities: No tenderness or edema noted. Integument:  Normal turgor. Warm, dry, without visible rash, unless noted elsewhere. Lymphatics: No lymphangitis or adenopathy noted. Neurological:  Oriented x 3, GCS 15. Slightly decreased range of motion of the right shoulder secondary to prior shoulder. States at baseline. Otherwise neurovascularly intact    Lab / Imaging Results   (All laboratory and radiology results have been personally reviewed by myself)  Labs:  No results found for this visit on 07/09/21. Imaging: All Radiology results interpreted by Radiologist unless otherwise noted.   CT HEAD WO CONTRAST   Final Result

## 2024-01-01 NOTE — THERAPY EVALUATION
"Acute Care - Physical Therapy Initial Evaluation  [facility]     Patient Name: Shweta Gonsales  : 1965  MRN: [id number]  Today's Date: 12/3/2018   Onset of Illness/Injury or Date of Surgery: 18  Date of Referral to PT: 18  Referring Physician: GISELE Chairez      Admit Date: 2018    Visit Dx:     ICD-10-CM ICD-9-CM   1. History of stroke in prior 3 months Z86.73 V12.54   2. Hypokalemia E87.6 276.8   3. Acute ischemic stroke (CMS/HCC) I63.9 434.91   4. Impaired mobility and ADLs Z74.09 799.89   5. Impaired functional mobility, balance, gait, and endurance Z74.09 V49.89     Patient Active Problem List   Diagnosis   • Type 2 diabetes mellitus (CMS/HCC)   • Hyperlipemia   • Essential hypertension   • Rheumatoid arthritis (CMS/HCC)   • CVA (cerebral vascular accident) (CMS/HCC)     Past Medical History:   Diagnosis Date   • Diabetes mellitus (CMS/HCC)    • Hemorrhagic stroke (CMS/HCC)    • Hyperlipidemia    • Hypertension    • RA (rheumatoid arthritis) (CMS/HCC)    • Stroke (CMS/HCC)      Past Surgical History:   Procedure Laterality Date   • CHOLECYSTECTOMY     • FRACTURE SURGERY     • HYSTERECTOMY          PT ASSESSMENT (last 12 hours)      Physical Therapy Evaluation     Row Name 18 1712          PT Evaluation Time/Intention    Subjective Information  complains of Glory'alfreda indepGt.inHall;RLE\"heavy\"w/gt.toNSDearlier(\"betterNow  -DM     Document Type  evaluation  -DM     Mode of Treatment  individual therapy;physical therapy  -DM     Patient Effort  good  -DM     Symptoms Noted During/After Treatment  dizziness;significant change in vital signs  -DM     Comment  MD Rina just gaveV.O.forBP Med;NSG. to bring s/p PT  -DM     Row Name 18 0532          General Information    Patient Profile Reviewed?  yes  -DM     Onset of Illness/Injury or Date of Surgery  18  -DM     Referring Physician  GISELE Chairez  -DM     Patient Observations  alert;cooperative;agree to therapy  -DM     " Patient/Family Observations  No family present  -DM     General Observations of Patient  mike's posn.in bed;RA,IV hep locked;scuds;exit alarm for chair disarmed  -DM     Prior Level of Function  independent:;all household mobility;community mobility;gait;transfer;bed mobility;ADL's  -DM     Equipment Currently Used at Home  shower chair;walker, rolling equip used s/p CVA 10/'18 but not currently  -DM     Pertinent History of Current Functional Problem  onset R extrem 'Heaviness' ( LE > UE), Decr. sensation RUE, & global HA; To [facility] ER; MRI: new isch. CVA in L parietal periventric. matter; also noted dec. K+ (3.3)& elev BP; h/o HBP, Rh.Arthr, CVA 10/'18, W/ resid. R HP;  -DM     Existing Precautions/Restrictions  fall  -DM     Limitations/Impairments  other (see comments) Monitor BP  -DM     Risks Reviewed  patient:;LOB;dizziness;increased discomfort;change in vital signs;lines disloged  -DM     Benefits Reviewed  patient:;improve function;increase independence;increase strength;increase balance;increase knowledge  -DM     Barriers to Rehab  previous functional deficit CVA 10/'18 W/resid.R HP  -DM     Row Name 12/03/18 1712          Relationship/Environment    Primary Source of Support/Comfort  child(alan);spouse  -DM     Lives With  child(alan), dependent;spouse 17 y.o.dtr  -DM     Family Caregiver if Needed  spouse  -DM     Row Name 12/03/18 1712          Resource/Environmental Concerns    Current Living Arrangements  home/apartment/condo  -DM     Resource/Environmental Concerns  none  -DM     Transportation Concerns  car, none  -DM     Row Name 12/03/18 1712          Home Main Entrance    Number of Stairs, Main Entrance  three  -DM     Stair Railings, Main Entrance  none  -DM     Row Name 12/03/18 1712          Cognitive Assessment/Interventions    Additional Documentation  Cognitive Assessment/Intervention (Group)  -DM     Row Name 12/03/18 1712          Cognitive Assessment/Intervention- PT/OT    Affect/Mental  Status (Cognitive)  WNL  -DM     Orientation Status (Cognition)  oriented x 4  -DM     Follows Commands (Cognition)  WNL  -DM     Cognitive Function (Cognitive)  WNL  -DM     Safety Deficit (Cognitive)  impulsivity init. decl.gt belt (coaxed to allow);removed gt.belt spont.  -DM     Personal Safety Interventions  fall prevention program maintained;gait belt;nonskid shoes/slippers when out of bed  -DM     Row Name 12/03/18 1712          Safety Issues, Functional Mobility    Impairments Affecting Function (Mobility)  balance;coordination;endurance/activity tolerance;strength  -DM     Row Name 12/03/18 1712          Bed Mobility Assessment/Treatment    Bed Mobility Assessment/Treatment  bed mobility (all) activities also bridging x 2  -DM     [place] Level (Bed Mobility)  conditional independence  -DM     Assistive Device (Bed Mobility)  head of bed elevated  -DM     Comment (Bed Mobility)  cues for HP;donned T.shoes in Priya position  -DM     Row Name 12/03/18 1712          Transfer Assessment/Treatment    Transfer Assessment/Treatment  sit-stand transfer;stand-sit transfer  -DM     Comment (Transfers)  cues for HP  -DM     Sit-Stand [place] (Transfers)  supervision  -DM     Stand-Sit [place] (Transfers)  independent  -DM     Row Name 12/03/18 1712          Sit-Stand Transfer    Assistive Device (Sit-Stand Transfers)  other (see comments) GT belt  -DM     Row Name 12/03/18 1712          Stand-Sit Transfer    Assistive Device (Stand-Sit Transfers)  other (see comments) gt belt  -DM     Row Name 12/03/18 1712          Gait/Stairs Assessment/Training    23860 - Gait Training Minutes   12  -DM     Gait/Stairs Assessment/Training  gait/ambulation independence;gait/ambulation assistive device;distance ambulated;gait pattern;gait deviations  -DM     [place] Level (Gait)  contact guard  -DM     Assistive Device (Gait)  other (see comments) gt belt  -DM     Distance in Feet (Gait)  450 50 (gt  "coord)+400(morel)  -DM     Pattern (Gait)  step-through  -DM     Deviations/Abnormal Patterns (Gait)  desirae decreased;stride length decreased  -DM     Bilateral Gait Deviations  heel strike decreased;weight shift ability decreased init.heavyR heelStrike,then T.shoes donned(improv.footStrike  -DM     Comment (Gait/Stairs)  improved gt.once t.shoes donned;'heavy RLE sensation less noticeable\"   -DM     Row Name 12/03/18 1712          General ROM    GENERAL ROM COMMENTS  no LE deficits  -DM     Row Name 12/03/18 1712          MMT (Manual Muscle Testing)    General MMT Comments  R hip flex & abd 5-/5  -DM     Row Name 12/03/18 1712          Motor Assessment/Intervention    Additional Documentation  Balance (Group);Balance Interventions (Group);Therapeutic Exercise (Group);Therapeutic Exercise Interventions (Group)  -DM     Row Name 12/03/18 1712          Therapeutic Exercise    99220 - PT Therapeutic Activity Minutes  18  -DM     Row Name 12/03/18 1712          Therapeutic Exercise    Upper Extremity Range of Motion (Therapeutic Exercise)  other (see comments) armchair push ups x 10  -DM     Lower Extremity (Therapeutic Exercise)  gluteal sets;heel slides, bilateral;LAQ (long arc quad), bilateral;marching while seated;marching while standing;quad sets, bilateral;SAQ (short arc quad), bilateral;SLR (straight leg raise), bilateral  -DM     Lower Extremity Range of Motion (Therapeutic Exercise)  hip abduction/adduction, bilateral;hip internal/external rotation, bilateral;ankle dorsiflexion/plantar flexion, bilateral bridgingx2(full,1/2),recip heel to Shin,LAQ;opp.toe/heelTaps  -DM     Exercise Type (Therapeutic Exercise)  AROM (active range of motion);isometric contraction, static  -DM     Position (Therapeutic Exercise)  seated;standing;supine  -DM     Sets/Reps (Therapeutic Exercise)  1/10  -DM     Comment (Therapeutic Exercise)  also abdom sets;dyn bal.exer & gt coord.activ. per \"bal.\"; ret. to issue CVA ed. folder & " written HEP/instruct  -DM     Row Name 12/03/18 1712          Gross Motor Coordination    Gross Motor Impairments  other (see comments) recip Heel to shin,LAQ,march (sit,stand),1/2 bridge  -DM     Row Name 12/03/18 1712          Balance    Balance  static sitting balance;static standing balance;dynamic sitting balance;dynamic standing balance  -DM     Row Name 12/03/18 1712          Static Sitting Balance    Level of [place] (Unsupported Sitting, Static Balance)  independent  -DM     Sitting Position (Unsupported Sitting, Static Balance)  sitting on edge of bed  -DM     Time Able to Maintain Position (Unsupported Sitting, Static Balance)  more than 5 minutes  -DM     Comment (Unsupported Sitting, Static Balance)  LE exer;phone conversation  -DM     Row Name 12/03/18 1712          Dynamic Sitting Balance    Level of [place], Reaches Outside Midline (Sitting, Dynamic Balance)  independent  -DM     Sitting Position, Reaches Outside Midline (Sitting, Dynamic Balance)  sitting on edge of bed  -DM     Comment, Reaches Outside Midline (Sitting, Dynamic Balance)  recip scooting;Reaching/donning T.shoes;PLB  -DM     Row Name 12/03/18 1712          Static Standing Balance    Level of [place] (Supported Standing, Static Balance)  independent  -DM     Time Able to Maintain Position (Supported Standing, Static Balance)  3 to 4 minutes  -DM     Assistive Device Utilized (Supported Standing, Static Balance)  other (see comments) gt belt  -DM     Comment (Supported Standing, Static Balance)  toe & heel risers;SLS;tandem stance  -DM     Row Name 12/03/18 1712          Dynamic Standing Balance    Level of [place], Reaches Outside Midline (Standing, Dynamic Balance)  contact guard assist  -DM     Time Able to Maintain Position, Reaches Outside Midline (Standing, Dynamic Balance)  4 to 5 minutes  -DM     Comment, Reaches Outside Midline (Standing, Dynamic Balance)  RECIP.HipAbd,ext,flex,SLS w/mini squat,t  -DM      Row Name 12/03/18 1712          Pain Assessment    Additional Documentation  Pain Scale: Numbers Pre/Post-Treatment (Group)  -DM     Row Name 12/03/18 1712          Pain Scale: Numbers Pre/Post-Treatment    Pain Scale: Numbers, Pretreatment  0/10 - no pain  -DM     Pain Scale: Numbers, Post-Treatment  0/10 - no pain  -DM     Row Name 12/03/18 1712          Plan of Care Review    Plan of Care Reviewed With  patient  -DM     Row Name 12/03/18 1712          Physical Therapy Clinical Impression    Date of Referral to PT  12/03/18  -DM     PT Diagnosis (PT Clinical Impression)  impaired funct mobil  -DM     Criteria for Skilled Interventions Met (PT Clinical Impression)  yes;treatment indicated  -DM     Pathology/Pathophysiology Noted (Describe Specifically for Each System)  neuromuscular  -DM     Impairments Found (describe specific impairments)  gait, locomotion, and balance  -DM     Rehab Potential (PT Clinical Summary)  good, to achieve stated therapy goals  -DM     Care Plan Review (PT)  evaluation/treatment results reviewed;patient/other agree to care plan  -DM     Row Name 12/03/18 1712          Vital Signs    Pre Systolic BP Rehab  137  -DM     Pre Treatment Diastolic BP  76  -DM     Post Systolic BP Rehab  153  -DM     Post Treatment Diastolic BP  87  -DM     Pretreatment Heart Rate (beats/min)  59  -DM     Intratreatment Heart Rate (beats/min)  69  -DM     Posttreatment Heart Rate (beats/min)  54  -DM     Pre SpO2 (%)  100  -DM     O2 Delivery Pre Treatment  room air  -DM     Post SpO2 (%)  100  -DM     O2 Delivery Post Treatment  room air  -DM     Pre Patient Position  Supine  -DM     Intra Patient Position  Standing  -DM     Post Patient Position  Sitting sitting Priya-style in bed  -DM     Row Name 12/03/18 1712          Physical Therapy Goals    Bed Mobility Goal Selection (PT)  bed mobility, PT goal 1  -DM     Transfer Goal Selection (PT)  transfer, PT goal 1  -DM     Gait Training Goal Selection (PT)   gait training, PT goal 1  -DM     Stairs Goal Selection (PT)  stairs, PT goal 1  -DM     Additional Documentation  Stairs Goal Selection (PT) (Row)  -DM     Row Name 12/03/18 1712          Bed Mobility Goal 1 (PT)    Activity/Assistive Device (Bed Mobility Goal 1, PT)  bed mobility activities, all  -DM     [place] Level/Cues Needed (Bed Mobility Goal 1, PT)  independent  -DM     Time Frame (Bed Mobility Goal 1, PT)  short term goal (STG);3 days  -DM     Progress/Outcomes (Bed Mobility Goal 1, PT)  goal met  -DM     Row Name 12/03/18 1712          Transfer Goal 1 (PT)    Activity/Assistive Device (Transfer Goal 1, PT)  sit-to-stand/stand-to-sit;bed-to-chair/chair-to-bed  -DM     [place] Level/Cues Needed (Transfer Goal 1, PT)  independent  -DM     Time Frame (Transfer Goal 1, PT)  short term goal (STG);3 days  -DM     Progress/Outcome (Transfer Goal 1, PT)  goal met  -DM     Row Name 12/03/18 1712          Gait Training Goal 1 (PT)    Activity/Assistive Device (Gait Training Goal 1, PT)  gait (walking locomotion) stable VS;NO dizziness  -DM     [place] Level (Gait Training Goal 1, PT)  independent  -DM     Distance (Gait Goal 1, PT)  700  -DM     Time Frame (Gait Training Goal 1, PT)  short term goal (STG);3 days  -DM     Row Name 12/03/18 1712          Stairs Goal 1 (PT)    Activity/Assistive Device (Stairs Goal 1, PT)  stairs, all skills  -DM     [place] Level/Cues Needed (Stairs Goal 1, PT)  independent  -DM     Number of Stairs (Stairs Goal 1, PT)  10  -DM     Time Frame (Stairs Goal 1, PT)  short term goal (STG);3 days  -DM     Progress/Outcome (Stairs Goal 1, PT)  goal partially met  -DM     Row Name 12/03/18 1712          Patient Education Goal (PT)    Activity (Patient Education Goal, PT)  HEP exer  -DM     [place]/Cues/Accuracy (Memory Goal 2, PT)  demonstrates adequately;verbalizes understanding  -DM     Time Frame (Patient Education Goal, PT)  short term goal (STG);3 days  -DM      "Row Name 12/03/18 1712          Positioning and Restraints    Pre-Treatment Position  sitting in chair/recliner  -DM     Post Treatment Position  bed  -DM     In Bed  notified nsg;sitting;call light within reach;encouraged to call for assist  -DM     Row Name 12/03/18 1712          Living Environment    Home Accessibility  stairs to enter home W.I.shower  -DM       User Key  (r) = Recorded By, (t) = Taken By, (c) = Cosigned By    Initials Name Provider Type    DM Birgit Burrell, PT Physical Therapist        Physical Therapy Education     Title: PT OT SLP Therapies (In Progress)     Topic: Physical Therapy (In Progress)     Point: Mobility training (In Progress)     Learning Progress Summary           Patient Eager, E,D,H, NR by DM at 12/3/2018  6:43 PM                   Point: Home exercise program (In Progress)     Learning Progress Summary           Patient Eager, E,D,H, NR by DM at 12/3/2018  6:43 PM                   Point: Body mechanics (In Progress)     Learning Progress Summary           Patient Eager, E,D,H, NR by DM at 12/3/2018  6:43 PM                   Point: Precautions (In Progress)     Learning Progress Summary           Patient Eager, E,D,H, NR by DM at 12/3/2018  6:43 PM                               User Key     Initials Effective Dates Name Provider Type Discipline    DM 06/19/15 -  Birgit Burrell, PT Physical Therapist PT              PT Recommendation and Plan  Anticipated Discharge Disposition (PT): home with assist  Planned Therapy Interventions (PT Eval): balance training, gait training, patient/family education, stair training, strengthening  Therapy Frequency (PT Clinical Impression): daily  Outcome Summary/Treatment Plan (PT)  Anticipated Discharge Disposition (PT): home with assist  Plan of Care Reviewed With: patient  Progress: improving  Outcome Summary: Presents w/ evolving symptoms, incl. new isch. CVA w/ incr. R extrem \"heaviness/numbness\"(baseline R HP s/p recent CVA), elev BP, " global HA, dec K +, dec. coord/bal. & impaired funct mobil; able to amb 450 ft & on10 steps w/ CGA, + dyn. sit/stand bal. & gt coord exer, but noted signif elev BP, fluct.sinus catracho, fatigue & dizziness s/p MINI squats; pt. decl. f/u PT at d/c (prefers HEP & working w/ staff at [facility])   Outcome Measures     Row Name 12/03/18 1712 12/03/18 0800 12/03/18 0740       How much help from another person do you currently need...    Turning from your back to your side while in flat bed without using bedrails?  4  -DM  --  --    Moving from lying on back to sitting on the side of a flat bed without bedrails?  4  -DM  --  --    Moving to and from a bed to a chair (including a wheelchair)?  4  -DM  --  --    Standing up from a chair using your arms (e.g., wheelchair, bedside chair)?  4  -DM  --  --    Climbing 3-5 steps with a railing?  3  -DM  --  --    To walk in hospital room?  4  -DM  --  --    AM-PAC 6 Clicks Score  23  -DM  --  --       How much help from another is currently needed...    Putting on and taking off regular lower body clothing?  --  --  4  -AC    Bathing (including washing, rinsing, and drying)  --  --  4  -AC    Toileting (which includes using toilet bed pan or urinal)  --  --  4  -AC    Putting on and taking off regular upper body clothing  --  --  4  -AC    Taking care of personal grooming (such as brushing teeth)  --  --  4  -AC    Eating meals  --  --  4  -AC    Score  --  --  24  -AC       Modified [place] Scale    Modified [place] Scale  1 - No significant disability despite symptoms.  Able to carry out all usual duties and activities.  -DM  --  1 - No significant disability despite symptoms.  Able to carry out all usual duties and activities.  -AC       Functional Assessment    Outcome Measure Options  AM-PAC 6 Clicks Basic Mobility (PT)  -DM  Modified [place]  -AC  AM-PAC 6 Clicks Daily Activity (OT)  -AC      User Key  (r) = Recorded By, (t) = Taken By, (c) = Cosigned By    Initials Name Provider  Type    Radha August, OT Occupational Therapist    Birgit Sam, PT Physical Therapist         Time Calculation:   PT Charges     Row Name 12/03/18 1851 12/03/18 1712          Time Calculation    Start Time  1712  -DM  --     PT Received On  12/03/18  -DM  --     PT Goal Re-Cert Due Date  12/13/18  -DM  --        Time Calculation- PT    Total Timed Code Minutes- PT  30 minute(s)  -DM  --        Timed Charges    83610 - Gait Training Minutes   --  12  -DM     86244 - PT Therapeutic Activity Minutes  --  18  -DM       User Key  (r) = Recorded By, (t) = Taken By, (c) = Cosigned By    Initials Name Provider Type    Birgit Sam, PT Physical Therapist        Therapy Suggested Charges     Code   Minutes Charges    48701 (CPT®) Hc Pt Neuromusc Re Education Ea 15 Min      92089 (CPT®) Hc Pt Ther Proc Ea 15 Min      58610 (CPT®) Hc Gait Training Ea 15 Min 12 1    45124 (CPT®) Hc Pt Therapeutic Act Ea 15 Min 18 1    93739 (CPT®) Hc Pt Manual Therapy Ea 15 Min      61669 (CPT®) Hc Pt Iontophoresis Ea 15 Min      23687 (CPT®) Hc Pt Elec Stim Ea-Per 15 Min      13214 (CPT®) Hc Pt Ultrasound Ea 15 Min      15970 (CPT®) Hc Pt Self Care/Mgmt/Train Ea 15 Min      04507 (CPT®) Hc Pt Prosthetic (S) Train Initial Encounter, Each 15 Min      79756 (CPT®) Hc Pt Orthotic(S)/Prosthetic(S) Encounter, Each 15 Min      84888 (CPT®) Hc Orthotic(S) Mgmt/Train Initial Encounter, Each 15min      Total  30 2        Therapy Charges for Today     Code Description Service Date Service Provider Modifiers Qty    75878689517 HC PT MOBILITY CURRENT 12/3/2018 Birgit Burrell, PT GP, CI 1    76984114832 HC PT MOBILITY PROJECTED 12/3/2018 Birgit Burrell, PT GP, CH 1    14988506348 HC GAIT TRAINING EA 15 MIN 12/3/2018 Birgit Burrell, PT GP 1    00631535784 HC PT THERAPEUTIC ACT EA 15 MIN 12/3/2018 Birgit Burrell, PT GP 1    34921222152 HC PT EVAL MOD COMPLEXITY 4 12/3/2018 Birgit Burrell, PT GP 1          PT G-Radha  PT  Professional Judgement Used?: Yes  Outcome Measure Options: AM-PAC 6 Clicks Basic Mobility (PT)  AM-PAC 6 Clicks Score: 23  Score: 24  Modified [place] Scale: 1 - No significant disability despite symptoms.  Able to carry out all usual duties and activities.  Functional Limitation: Mobility: Walking and moving around  Mobility: Walking and Moving Around Current Status (): At least 1 percent but less than 20 percent impaired, limited or restricted  Mobility: Walking and Moving Around Goal Status (): 0 percent impaired, limited or restricted      Birgit Burrell, PT  12/3/2018        Adequate feeding  Weight gain  Concerns for jaundice

## 2024-05-23 ENCOUNTER — HOSPITAL ENCOUNTER (EMERGENCY)
Facility: HOSPITAL | Age: 59
Discharge: HOME | End: 2024-05-24
Attending: EMERGENCY MEDICINE
Payer: COMMERCIAL

## 2024-05-23 DIAGNOSIS — R51.9 ACUTE NONINTRACTABLE HEADACHE, UNSPECIFIED HEADACHE TYPE: Primary | ICD-10-CM

## 2024-05-23 PROCEDURE — 99284 EMERGENCY DEPT VISIT MOD MDM: CPT

## 2024-05-24 ENCOUNTER — APPOINTMENT (OUTPATIENT)
Facility: HOSPITAL | Age: 59
End: 2024-05-24
Payer: COMMERCIAL

## 2024-05-24 VITALS
DIASTOLIC BLOOD PRESSURE: 68 MMHG | SYSTOLIC BLOOD PRESSURE: 119 MMHG | RESPIRATION RATE: 17 BRPM | WEIGHT: 167 LBS | BODY MASS INDEX: 29.59 KG/M2 | OXYGEN SATURATION: 97 % | HEART RATE: 60 BPM | TEMPERATURE: 97.7 F | HEIGHT: 63 IN

## 2024-05-24 PROCEDURE — 70450 CT HEAD/BRAIN W/O DYE: CPT

## 2024-05-24 PROCEDURE — 63710000001 DIPHENHYDRAMINE PER 50 MG: Performed by: EMERGENCY MEDICINE

## 2024-05-24 RX ORDER — DIPHENHYDRAMINE HCL 25 MG
25 CAPSULE ORAL ONCE
Status: COMPLETED | OUTPATIENT
Start: 2024-05-24 | End: 2024-05-24

## 2024-05-24 RX ORDER — METOCLOPRAMIDE 10 MG/1
10 TABLET ORAL ONCE
Status: COMPLETED | OUTPATIENT
Start: 2024-05-24 | End: 2024-05-24

## 2024-05-24 RX ADMIN — METOCLOPRAMIDE 10 MG: 10 TABLET ORAL at 01:17

## 2024-05-24 RX ADMIN — DIPHENHYDRAMINE HYDROCHLORIDE 25 MG: 25 CAPSULE ORAL at 01:17

## 2024-05-24 NOTE — FSED PROVIDER NOTE
Subjective   History of Present Illness  Patient presents to the emergency department for headache.  Says she has had a headache on the left side of her head for the past several hours.  Waxes and wanes.  Says she did have some mild visual changes before this started.  Says she is concerned because she has a history of a hemorrhagic stroke in the past with no residual deficit.  She denies any nausea vomiting or changes in vision at this time.  No photophobia or sonophobia    History provided by:  Patient   used: No        Review of Systems   Neurological:  Positive for headaches.   All other systems reviewed and are negative.      Past Medical History:   Diagnosis Date    Diabetes mellitus     Hemorrhagic stroke     Hyperlipidemia     Hypertension     Ischemic cerebrovascular accident (CVA) 12/02/2018    RA (rheumatoid arthritis)     Stroke        No Known Allergies    Past Surgical History:   Procedure Laterality Date    CHOLECYSTECTOMY      FRACTURE SURGERY      HYSTERECTOMY         History reviewed. No pertinent family history.    Social History     Socioeconomic History    Marital status:    Tobacco Use    Smoking status: Never    Smokeless tobacco: Never   Substance and Sexual Activity    Alcohol use: No    Drug use: No    Sexual activity: Defer           Objective   Physical Exam  Constitutional:       Appearance: Normal appearance. She is obese.   HENT:      Head: Normocephalic and atraumatic.      Nose: Nose normal.      Mouth/Throat:      Mouth: Mucous membranes are moist.      Pharynx: Oropharynx is clear.   Eyes:      Extraocular Movements: Extraocular movements intact.      Pupils: Pupils are equal, round, and reactive to light.   Cardiovascular:      Rate and Rhythm: Normal rate and regular rhythm.      Pulses: Normal pulses.      Heart sounds: Normal heart sounds.   Pulmonary:      Effort: Pulmonary effort is normal. No respiratory distress.      Breath sounds: Normal breath  sounds.   Abdominal:      General: There is no distension.      Palpations: Abdomen is soft.      Tenderness: There is no abdominal tenderness. There is no guarding.   Musculoskeletal:         General: No swelling, tenderness, deformity or signs of injury. Normal range of motion.      Cervical back: Normal range of motion and neck supple. No rigidity.   Skin:     General: Skin is warm and dry.   Neurological:      General: No focal deficit present.      Mental Status: She is alert and oriented to person, place, and time.      Cranial Nerves: No cranial nerve deficit.      Motor: No weakness.   Psychiatric:         Mood and Affect: Mood normal.         Behavior: Behavior normal.         Procedures           ED Course                                           Medical Decision Making  Hemodynamically stable and afebrile.  Patient is no focal neurologic deficit.  CT scan of the head shows no acute abnormalities.  Given migraine cocktail is feeling better.  Given return precautions care instructions and discharge    Problems Addressed:  Acute nonintractable headache, unspecified headache type: complicated acute illness or injury    Amount and/or Complexity of Data Reviewed  Radiology: ordered.    Risk  Prescription drug management.        Final diagnoses:   Acute nonintractable headache, unspecified headache type       ED Disposition  ED Disposition       ED Disposition   Discharge    Condition   Stable    Comment   --               Jazmine Craig MD  [address] [place] [id number]  [phone]    Schedule an appointment as soon as possible for a visit   As needed    [facility] EMERGENCY DEPARTMENT [place]  [address]  [facility] [address] [phone]  Go to   As needed         Medication List        Changed      * potassium chloride 10 MEQ CR capsule  Commonly known as: MICRO-K  Take 1 capsule by mouth Daily.  What changed: how much to take     * potassium  chloride 10 MEQ CR tablet  Take 1 tablet by mouth Daily.  What changed: Another medication with the same name was changed. Make sure you understand how and when to take each.     * potassium chloride 10 MEQ CR tablet  Take 1 tablet by mouth Daily.  What changed: Another medication with the same name was changed. Make sure you understand how and when to take each.           * This list has 3 medication(s) that are the same as other medications prescribed for you. Read the directions carefully, and ask your doctor or other care provider to review them with you.

## 2025-03-08 ENCOUNTER — HOSPITAL ENCOUNTER (EMERGENCY)
Facility: HOSPITAL | Age: 60
Discharge: HOME | End: 2025-03-08
Attending: EMERGENCY MEDICINE
Payer: COMMERCIAL

## 2025-03-08 ENCOUNTER — APPOINTMENT (OUTPATIENT)
Facility: HOSPITAL | Age: 60
End: 2025-03-08
Payer: COMMERCIAL

## 2025-03-08 VITALS
RESPIRATION RATE: 16 BRPM | HEART RATE: 77 BPM | HEIGHT: 64 IN | WEIGHT: 175 LBS | BODY MASS INDEX: 29.88 KG/M2 | DIASTOLIC BLOOD PRESSURE: 94 MMHG | SYSTOLIC BLOOD PRESSURE: 135 MMHG | TEMPERATURE: 98 F | OXYGEN SATURATION: 98 %

## 2025-03-08 DIAGNOSIS — K58.0 IRRITABLE BOWEL SYNDROME WITH DIARRHEA: Primary | ICD-10-CM

## 2025-03-08 LAB
ALBUMIN SERPL-MCNC: 4.4 G/DL (ref 3.5–5.2)
ALBUMIN/GLOB SERPL: 1.2 G/DL
ALP SERPL-CCNC: 91 U/L (ref 39–117)
ALT SERPL W P-5'-P-CCNC: 18 U/L (ref 1–33)
ANION GAP SERPL CALCULATED.3IONS-SCNC: 15.5 MMOL/L (ref 5–15)
AST SERPL-CCNC: 31 U/L (ref 1–32)
BACTERIA UR QL AUTO: ABNORMAL /HPF
BASOPHILS # BLD AUTO: 0.02 10*3/MM3 (ref 0–0.2)
BASOPHILS NFR BLD AUTO: 0.4 % (ref 0–1.5)
BILIRUB SERPL-MCNC: 0.4 MG/DL (ref 0–1.2)
BILIRUB UR QL STRIP: NEGATIVE
BUN SERPL-MCNC: 9 MG/DL (ref 6–20)
BUN/CREAT SERPL: 10.7 (ref 7–25)
CALCIUM SPEC-SCNC: 9.6 MG/DL (ref 8.6–10.5)
CHLORIDE SERPL-SCNC: 100 MMOL/L (ref 98–107)
CLARITY UR: CLEAR
CO2 SERPL-SCNC: 25.5 MMOL/L (ref 22–29)
COLOR UR: YELLOW
CREAT SERPL-MCNC: 0.84 MG/DL (ref 0.57–1)
CRP SERPL-MCNC: 2.51 MG/DL (ref 0–0.5)
DEPRECATED RDW RBC AUTO: 40.6 FL (ref 37–54)
EGFRCR SERPLBLD CKD-EPI 2021: 80.7 ML/MIN/1.73
EOSINOPHIL # BLD AUTO: 0.1 10*3/MM3 (ref 0–0.4)
EOSINOPHIL NFR BLD AUTO: 2.1 % (ref 0.3–6.2)
ERYTHROCYTE [DISTWIDTH] IN BLOOD BY AUTOMATED COUNT: 13 % (ref 12.3–15.4)
GLOBULIN UR ELPH-MCNC: 3.7 GM/DL
GLUCOSE SERPL-MCNC: 107 MG/DL (ref 65–99)
GLUCOSE UR STRIP-MCNC: ABNORMAL MG/DL
HCT VFR BLD AUTO: 37.3 % (ref 34–46.6)
HGB BLD-MCNC: 13.1 G/DL (ref 12–15.9)
HGB UR QL STRIP.AUTO: NEGATIVE
HYALINE CASTS UR QL AUTO: ABNORMAL /LPF
IMM GRANULOCYTES # BLD AUTO: 0.01 10*3/MM3 (ref 0–0.05)
IMM GRANULOCYTES NFR BLD AUTO: 0.2 % (ref 0–0.5)
KETONES UR QL STRIP: NEGATIVE
LEUKOCYTE ESTERASE UR QL STRIP.AUTO: ABNORMAL
LIPASE SERPL-CCNC: 61 U/L (ref 13–60)
LYMPHOCYTES # BLD AUTO: 2.02 10*3/MM3 (ref 0.7–3.1)
LYMPHOCYTES NFR BLD AUTO: 42.6 % (ref 19.6–45.3)
MAGNESIUM SERPL-MCNC: 2.1 MG/DL (ref 1.6–2.6)
MCH RBC QN AUTO: 29.6 PG (ref 26.6–33)
MCHC RBC AUTO-ENTMCNC: 35.1 G/DL (ref 31.5–35.7)
MCV RBC AUTO: 84.2 FL (ref 79–97)
MONOCYTES # BLD AUTO: 0.6 10*3/MM3 (ref 0.1–0.9)
MONOCYTES NFR BLD AUTO: 12.7 % (ref 5–12)
NEUTROPHILS NFR BLD AUTO: 1.99 10*3/MM3 (ref 1.7–7)
NEUTROPHILS NFR BLD AUTO: 42 % (ref 42.7–76)
NITRITE UR QL STRIP: NEGATIVE
PH UR STRIP.AUTO: 6 [PH] (ref 5–8)
PLATELET # BLD AUTO: 292 10*3/MM3 (ref 140–450)
PMV BLD AUTO: 10.2 FL (ref 6–12)
POTASSIUM SERPL-SCNC: 3.3 MMOL/L (ref 3.5–5.2)
PROT SERPL-MCNC: 8.1 G/DL (ref 6–8.5)
PROT UR QL STRIP: ABNORMAL
RBC # BLD AUTO: 4.43 10*6/MM3 (ref 3.77–5.28)
RBC # UR STRIP: ABNORMAL /HPF
REF LAB TEST METHOD: ABNORMAL
SODIUM SERPL-SCNC: 141 MMOL/L (ref 136–145)
SP GR UR STRIP: 1.02 (ref 1–1.03)
SQUAMOUS #/AREA URNS HPF: ABNORMAL /HPF
TRANS CELLS #/AREA URNS HPF: ABNORMAL /HPF
UROBILINOGEN UR QL STRIP: ABNORMAL
WBC # UR STRIP: ABNORMAL /HPF
WBC NRBC COR # BLD AUTO: 4.74 10*3/MM3 (ref 3.4–10.8)

## 2025-03-08 PROCEDURE — 81001 URINALYSIS AUTO W/SCOPE: CPT

## 2025-03-08 PROCEDURE — 86140 C-REACTIVE PROTEIN: CPT

## 2025-03-08 PROCEDURE — 80053 COMPREHEN METABOLIC PANEL: CPT

## 2025-03-08 PROCEDURE — 85025 COMPLETE CBC W/AUTO DIFF WBC: CPT

## 2025-03-08 PROCEDURE — 83735 ASSAY OF MAGNESIUM: CPT

## 2025-03-08 PROCEDURE — 83690 ASSAY OF LIPASE: CPT

## 2025-03-08 PROCEDURE — 74177 CT ABD & PELVIS W/CONTRAST: CPT

## 2025-03-08 PROCEDURE — 99285 EMERGENCY DEPT VISIT HI MDM: CPT

## 2025-03-08 PROCEDURE — 25510000001 IOPAMIDOL 61 % SOLUTION: Performed by: EMERGENCY MEDICINE

## 2025-03-08 PROCEDURE — 96360 HYDRATION IV INFUSION INIT: CPT

## 2025-03-08 PROCEDURE — 25810000003 SODIUM CHLORIDE 0.9 % SOLUTION

## 2025-03-08 RX ORDER — IOPAMIDOL 612 MG/ML
100 INJECTION, SOLUTION INTRAVASCULAR
Status: COMPLETED | OUTPATIENT
Start: 2025-03-08 | End: 2025-03-08

## 2025-03-08 RX ORDER — ONDANSETRON 4 MG/1
4 TABLET, ORALLY DISINTEGRATING ORAL EVERY 8 HOURS PRN
Qty: 30 TABLET | Refills: 0 | Status: SHIPPED | OUTPATIENT
Start: 2025-03-08

## 2025-03-08 RX ORDER — POTASSIUM CHLORIDE 750 MG/1
20 CAPSULE, EXTENDED RELEASE ORAL ONCE
Status: COMPLETED | OUTPATIENT
Start: 2025-03-08 | End: 2025-03-08

## 2025-03-08 RX ADMIN — SODIUM CHLORIDE 1000 ML: 9 INJECTION, SOLUTION INTRAVENOUS at 13:41

## 2025-03-08 RX ADMIN — IOPAMIDOL 96 ML: 612 INJECTION, SOLUTION INTRAVENOUS at 15:02

## 2025-03-08 RX ADMIN — POTASSIUM CHLORIDE 20 MEQ: 750 CAPSULE, EXTENDED RELEASE ORAL at 15:22

## 2025-03-08 NOTE — DISCHARGE INSTRUCTIONS
Use Zofran as needed for nausea and vomiting and continue to push fluids at home.  Follow-up with gastroenterology and call the clinic on Monday.  Follow-up with your primary care in regard to left renal cyst and continue with follow-up with primary care for potassium of 3.3.  Return to the emergency department with any new or worsening symptoms.

## 2025-03-08 NOTE — FSED PROVIDER NOTE
Subjective   History of Present Illness  59-year-old female with a history of diabetes mellitus, hypertension, hyperlipidemia presents to the emergency department today with complaints of diarrhea.  Patient states she stopped her Ozempic 2 to 3 weeks ago for which she has noticed yellow color diarrhea.  Patient states she has approximately 3 episodes per day.  She states that she had an episode of this before that resolved after she started taking Ozempic.  She states that it is worse after she eats with associated lower diffuse abdominal pain that is resolved with a bowel movement.  Patient denies any dysuria, hematuria, nausea, vomiting.  Patient states she was diagnosed with the flu earlier this week but denies any complications or further symptomatology at today's visit.  Review of Systems   Constitutional: Negative.    HENT: Negative.     Eyes: Negative.    Respiratory: Negative.     Cardiovascular: Negative.    Gastrointestinal:  Positive for abdominal pain, anal bleeding and diarrhea. Negative for abdominal distention, blood in stool, constipation, nausea, rectal pain and vomiting.   Endocrine: Negative.    Genitourinary: Negative.    Musculoskeletal: Negative.    Allergic/Immunologic: Negative.    Neurological: Negative.    Hematological: Negative.    Psychiatric/Behavioral: Negative.         Past Medical History:   Diagnosis Date    Diabetes mellitus     Hemorrhagic stroke     Hyperlipidemia     Hypertension     Ischemic cerebrovascular accident (CVA) 12/02/2018    RA (rheumatoid arthritis)     Stroke        No Known Allergies    Past Surgical History:   Procedure Laterality Date    CHOLECYSTECTOMY      FRACTURE SURGERY      HYSTERECTOMY         History reviewed. No pertinent family history.    Social History     Socioeconomic History    Marital status:    Tobacco Use    Smoking status: Never    Smokeless tobacco: Never   Substance and Sexual Activity    Alcohol use: No    Drug use: No    Sexual  activity: Defer           Objective   Physical Exam  Constitutional:       Appearance: Normal appearance.   HENT:      Head: Normocephalic and atraumatic.      Nose: Nose normal.      Mouth/Throat:      Mouth: Mucous membranes are moist.   Eyes:      Pupils: Pupils are equal, round, and reactive to light.   Cardiovascular:      Rate and Rhythm: Normal rate.      Heart sounds: Normal heart sounds.   Pulmonary:      Effort: Pulmonary effort is normal.      Breath sounds: Normal breath sounds.   Abdominal:      General: Abdomen is flat. Bowel sounds are normal.      Palpations: Abdomen is soft.   Musculoskeletal:         General: Normal range of motion.      Cervical back: Normal range of motion and neck supple.   Skin:     General: Skin is warm.      Capillary Refill: Capillary refill takes less than 2 seconds.   Neurological:      General: No focal deficit present.      Mental Status: She is alert.   Psychiatric:         Mood and Affect: Mood normal.         Behavior: Behavior normal.         Procedures           ED Course      Patient exam conducted lab/imaging evaluated.  Patient physical exam revealed very mild lower abdominal tenderness without guarding or rigidity.  Patient had no other acute abnormalities on physical exam.  Patient laboratory values were nonactionable.  Patient UA revealed asymptomatic bacteriuria and is not to be treated at this time.  Patient further had an elevated CRP likely reactive to the patient's current state.  No evidence of acute intra-abdominal abnormality.  CT exam normal.  Patient educated of incidental findings.  Patient educated to continue with Zofran as needed if she develops nausea as patient does have influenza currently.  Patient educated to continue with potassium related foods as potassium was 3.3.  Not a significant abnormality.  Patient was educated to wear this has been going on extensively and the abdominal pain is resolved by defecation and worse after eating that  this is likely irritable bowel syndrome.  Patient had an episode before starting Ozempic that was prolonged and meeting criteria for irritable bowel syndrome.  Patient educated to follow-up with gastroenterology and return to the emergency department with any new or worsening symptoms.  Patient discharged hemodynamically stable and afebrile.                                     Medical Decision Making  Problems Addressed:  Irritable bowel syndrome with diarrhea: complicated acute illness or injury    Amount and/or Complexity of Data Reviewed  Labs: ordered.  Radiology: ordered.    Risk  Prescription drug management.        Final diagnoses:   Irritable bowel syndrome with diarrhea       ED Disposition  ED Disposition       ED Disposition   Discharge    Condition   Stable    Comment   --               Jazmine Craig MD  [address] [facility] [address]  [phone]          [facility] GASTROENTEROLOGY [place]  [address] [address]  [phone]  Schedule an appointment as soon as possible for a visit       [facility] GASTROENTEROLOGY  [address]  [facility] [address]  [phone]             Medication List        New Prescriptions      ondansetron ODT 4 MG disintegrating tablet  Commonly known as: ZOFRAN-ODT  Take 1 tablet by mouth Every 8 (Eight) Hours As Needed for Nausea.            Changed      * potassium chloride 10 MEQ CR capsule  Commonly known as: MICRO-K  Take 1 capsule by mouth Daily.  What changed: how much to take     * potassium chloride 10 MEQ CR tablet  Take 1 tablet by mouth Daily.  What changed: Another medication with the same name was changed. Make sure you understand how and when to take each.     * potassium chloride 10 MEQ CR tablet  Take 1 tablet by mouth Daily.  What changed: Another medication with the same name was changed. Make sure you understand how and when to take each.           * This list has  3 medication(s) that are the same as other medications prescribed for you. Read the directions carefully, and ask your doctor or other care provider to review them with you.                   Where to Get Your Medications        These medications were sent to [facility] [address], [address], [place] [address]      Hours: Monday to Friday 9 AM to 5:30 PM Phone: [phone]   ondansetron ODT 4 MG disintegrating tablet

## 2025-07-11 ENCOUNTER — APPOINTMENT (OUTPATIENT)
Facility: HOSPITAL | Age: 60
End: 2025-07-11
Payer: COMMERCIAL

## 2025-07-11 ENCOUNTER — HOSPITAL ENCOUNTER (EMERGENCY)
Facility: HOSPITAL | Age: 60
Discharge: HOME | End: 2025-07-11
Attending: STUDENT IN AN ORGANIZED HEALTH CARE EDUCATION/TRAINING PROGRAM
Payer: COMMERCIAL

## 2025-07-11 VITALS
HEIGHT: 64 IN | RESPIRATION RATE: 18 BRPM | TEMPERATURE: 98.1 F | OXYGEN SATURATION: 99 % | HEART RATE: 56 BPM | BODY MASS INDEX: 28.51 KG/M2 | WEIGHT: 167 LBS | DIASTOLIC BLOOD PRESSURE: 79 MMHG | SYSTOLIC BLOOD PRESSURE: 150 MMHG

## 2025-07-11 DIAGNOSIS — G44.89 OTHER HEADACHE SYNDROME: ICD-10-CM

## 2025-07-11 DIAGNOSIS — R03.0 ELEVATED BLOOD PRESSURE READING: Primary | ICD-10-CM

## 2025-07-11 LAB
ALBUMIN SERPL-MCNC: 3.7 G/DL (ref 3.5–5.2)
ALBUMIN/GLOB SERPL: 1.1 G/DL
ALP SERPL-CCNC: 100 U/L (ref 39–117)
ALT SERPL W P-5'-P-CCNC: 17 U/L (ref 1–33)
ANION GAP SERPL CALCULATED.3IONS-SCNC: 10.1 MMOL/L (ref 5–15)
AST SERPL-CCNC: 19 U/L (ref 1–32)
BASOPHILS # BLD AUTO: 0.02 10*3/MM3 (ref 0–0.2)
BASOPHILS NFR BLD AUTO: 0.3 % (ref 0–1.5)
BILIRUB SERPL-MCNC: 0.2 MG/DL (ref 0–1.2)
BUN SERPL-MCNC: 9.3 MG/DL (ref 6–20)
BUN/CREAT SERPL: 11.1 (ref 7–25)
CALCIUM SPEC-SCNC: 8.7 MG/DL (ref 8.6–10.5)
CHLORIDE SERPL-SCNC: 106 MMOL/L (ref 98–107)
CO2 SERPL-SCNC: 26.9 MMOL/L (ref 22–29)
CREAT SERPL-MCNC: 0.84 MG/DL (ref 0.57–1)
DEPRECATED RDW RBC AUTO: 39.6 FL (ref 37–54)
EGFRCR SERPLBLD CKD-EPI 2021: 80.7 ML/MIN/1.73
EOSINOPHIL # BLD AUTO: 0.11 10*3/MM3 (ref 0–0.4)
EOSINOPHIL NFR BLD AUTO: 1.5 % (ref 0.3–6.2)
ERYTHROCYTE [DISTWIDTH] IN BLOOD BY AUTOMATED COUNT: 12.8 % (ref 12.3–15.4)
GLOBULIN UR ELPH-MCNC: 3.5 GM/DL
GLUCOSE SERPL-MCNC: 99 MG/DL (ref 65–99)
HCT VFR BLD AUTO: 36.8 % (ref 34–46.6)
HGB BLD-MCNC: 12.6 G/DL (ref 12–15.9)
HOLD SPECIMEN: NORMAL
HOLD SPECIMEN: NORMAL
IMM GRANULOCYTES # BLD AUTO: 0.01 10*3/MM3 (ref 0–0.05)
IMM GRANULOCYTES NFR BLD AUTO: 0.1 % (ref 0–0.5)
LYMPHOCYTES # BLD AUTO: 3.04 10*3/MM3 (ref 0.7–3.1)
LYMPHOCYTES NFR BLD AUTO: 40.2 % (ref 19.6–45.3)
MCH RBC QN AUTO: 29 PG (ref 26.6–33)
MCHC RBC AUTO-ENTMCNC: 34.2 G/DL (ref 31.5–35.7)
MCV RBC AUTO: 84.6 FL (ref 79–97)
MONOCYTES # BLD AUTO: 0.44 10*3/MM3 (ref 0.1–0.9)
MONOCYTES NFR BLD AUTO: 5.8 % (ref 5–12)
NEUTROPHILS NFR BLD AUTO: 3.94 10*3/MM3 (ref 1.7–7)
NEUTROPHILS NFR BLD AUTO: 52.1 % (ref 42.7–76)
PLATELET # BLD AUTO: 312 10*3/MM3 (ref 140–450)
PMV BLD AUTO: 9.8 FL (ref 6–12)
POTASSIUM SERPL-SCNC: 3.6 MMOL/L (ref 3.5–5.2)
PROT SERPL-MCNC: 7.2 G/DL (ref 6–8.5)
RBC # BLD AUTO: 4.35 10*6/MM3 (ref 3.77–5.28)
SODIUM SERPL-SCNC: 143 MMOL/L (ref 136–145)
WBC NRBC COR # BLD AUTO: 7.56 10*3/MM3 (ref 3.4–10.8)
WHOLE BLOOD HOLD COAG: NORMAL

## 2025-07-11 PROCEDURE — 70450 CT HEAD/BRAIN W/O DYE: CPT

## 2025-07-11 PROCEDURE — 85025 COMPLETE CBC W/AUTO DIFF WBC: CPT

## 2025-07-11 PROCEDURE — 99284 EMERGENCY DEPT VISIT MOD MDM: CPT | Performed by: STUDENT IN AN ORGANIZED HEALTH CARE EDUCATION/TRAINING PROGRAM

## 2025-07-11 PROCEDURE — 80053 COMPREHEN METABOLIC PANEL: CPT

## 2025-07-11 NOTE — FSED PROVIDER NOTE
"Subjective  History of Present Illness:    The patient is a 59-year-old female past medical history of diabetes, CVA, hyperlipidemia, hypertension, presents with a frontal headache and concern for elevated blood pressure.  Patient states yesterday morning she missed her losartan dose on accident.  Patient states she did take her dose this morning.  Patient states she woke up this morning with a headache so she took her blood pressure and it was 190/80 at home.  Patient denies visual changes, room spinning sensation/dizziness, nausea, vomiting, neck pain or stiffness, chest pain, shortness of breath      Nurses Notes reviewed and agree, including vitals, allergies, social history and prior medical history.     REVIEW OF SYSTEMS: All systems reviewed and not pertinent unless noted.  Review of Systems    Past Medical History:   Diagnosis Date    Diabetes mellitus     Hemorrhagic stroke     Hyperlipidemia     Hypertension     Ischemic cerebrovascular accident (CVA) 12/02/2018    RA (rheumatoid arthritis)     Stroke        Allergies:    Patient has no known allergies.      Past Surgical History:   Procedure Laterality Date    CHOLECYSTECTOMY      FRACTURE SURGERY      HYSTERECTOMY           Social History     Socioeconomic History    Marital status:    Tobacco Use    Smoking status: Never    Smokeless tobacco: Never   Substance and Sexual Activity    Alcohol use: No    Drug use: No    Sexual activity: Defer         History reviewed. No pertinent family history.    Objective  Physical Exam:  /83   Pulse 53   Temp 98.1 °F (36.7 °C) (Oral)   Resp 18   Ht 162.6 cm (64\")   Wt 75.8 kg (167 lb)   SpO2 98%   BMI 28.67 kg/m²      Physical Exam  Constitutional:       Appearance: Well-developed. No acute distress  HENT:      Head: Normocephalic and atraumatic.      Mouth/Throat:      Mouth: Mucous membranes are moist.      Eyes:      Extraocular Movements: Extraocular movements intact.   Cardiovascular:      " Rate and Rhythm: Normal rate and regular rhythm.      Heart sounds: Normal heart sounds.   No peripheral edema noted  Pulmonary:      Effort: Pulmonary effort is normal. No respiratory distress.      Breath sounds: Normal breath sounds.   Abdominal:      General: There is no distension.      Palpations: Abdomen is soft and nontender. No rebound tenderness or guarding noted  Musculoskeletal:         General: No swelling or tenderness.       Extremities: Moves all 4s   Skin:     General: Skin is warm and dry.      Capillary Refill: Capillary refill takes less than 2 seconds.   Neurological:      Mental Status:Alert and oriented to person, place, and time.   Mentation is normal   Psychiatric:         Mood and Affect: Mood normal.         Behavior: Behavior normal.     Procedures    ED Course:         Lab Results (last 24 hours)       Procedure Component Value Units Date/Time    CBC Auto Differential [[id number]]  (Normal) Collected: 07/11/25 0917    Specimen: Blood Updated: 07/11/25 0921     WBC 7.56 10*3/mm3      RBC 4.35 10*6/mm3      Hemoglobin 12.6 g/dL      Hematocrit 36.8 %      MCV 84.6 fL      MCH 29.0 pg      MCHC 34.2 g/dL      RDW 12.8 %      RDW-SD 39.6 fl      MPV 9.8 fL      Platelets 312 10*3/mm3      Neutrophil % 52.1 %      Lymphocyte % 40.2 %      Monocyte % 5.8 %      Eosinophil % 1.5 %      Basophil % 0.3 %      Immature Grans % 0.1 %      Neutrophils, Absolute 3.94 10*3/mm3      Lymphocytes, Absolute 3.04 10*3/mm3      Monocytes, Absolute 0.44 10*3/mm3      Eosinophils, Absolute 0.11 10*3/mm3      Basophils, Absolute 0.02 10*3/mm3      Immature Grans, Absolute 0.01 10*3/mm3     Comprehensive Metabolic Panel [[id number]] Collected: 07/11/25 0917    Specimen: Blood Updated: 07/11/25 0939     Glucose 99 mg/dL      BUN 9.3 mg/dL      Creatinine 0.84 mg/dL      Sodium 143 mmol/L      Potassium 3.6 mmol/L      Chloride 106 mmol/L      CO2 26.9 mmol/L      Calcium 8.7 mg/dL      Total Protein 7.2 g/dL       Albumin 3.7 g/dL      ALT (SGPT) 17 U/L      AST (SGOT) 19 U/L      Alkaline Phosphatase 100 U/L      Total Bilirubin 0.2 mg/dL      Globulin 3.5 gm/dL      A/G Ratio 1.1 g/dL      BUN/Creatinine Ratio 11.1     Anion Gap 10.1 mmol/L      eGFR 80.7 mL/min/1.73     Narrative:      GFR Categories in Chronic Kidney Disease (CKD)              GFR Category          GFR (mL/min/1.73)    Interpretation  G1                    90 or greater        Normal or high (1)  G2                    60-89                Mild decrease (1)  G3a                   45-59                Mild to moderate decrease  G3b                   30-44                Moderate to severe decrease  G4                    15-29                Severe decrease  G5                    14 or less           Kidney failure    (1)In the absence of evidence of kidney disease, neither GFR category G1 or G2 fulfill the criteria for CKD.    eGFR calculation 2021 CKD-EPI creatinine equation, which does not include race as a factor             CT Head Without Contrast  Result Date: 7/11/2025  CT HEAD WO CONTRAST Date of Exam: 7/11/2025 9:46 AM EDT Indication: headache. Comparison: Head CT 5/24/2024, brain MRI 1/14/2019. Technique: Axial CT images were obtained of the head without contrast administration.  Automated exposure control and iterative construction methods were used. Findings: No evidence of acute intracranial hemorrhage or mass effect. No extra-axial collection. The gray white matter differentiation is preserved. Ventricles and sulci are symmetric. The mastoid air cells and paranasal sinuses are well aerated. Globes and extraocular muscles are unremarkable. No acute or suspicious osseous abnormality. Soft tissues within normal limits.     Impression: Impression: No acute intracranial findings. Electronically Signed: Keyur Murcia MD  7/11/2025 10:04 AM EDT  Workstation ID: [id number]         MDM      Initial impression of presenting illness: Concern for elevated  blood pressure reading, forgot dose yesterday morning.  Complains of frontal headache.  History of CVA.  No focal neurological deficits noted upon exam    DDX: includes but is not limited to: Hypertension, medication misuse, headache,    Patient arrives comfortable, BP initially 191/87, with vitals interpreted by myself.     Pertinent results: Lab work nonactionable.  CT scan of the head did not show any acute abnormalities.    Diagnostic information from other sources: Chart review    Interventions / Re-evaluation: Patient resting comfortably, states her headache is improving    Medications - No data to display    Results/clinical rationale were discussed with patient    Data interpreted: Nursing notes reviewed, vital signs reviewed.  Labs independently interpreted by me     Counseling: Discussed the results above with the patient regarding need for admission or discharge.  Patient understands and agrees plan of care.  Discussed with patients the results from today's visit. Discussed with patient strict return precautions and they verbalize understanding. Recommend to them following up with primary care as soon as possible. Patient is discharged hemodynamically stable and comfortable.   Discussed with patient to be sure she takes her medications as prescribed and continue to monitor her blood pressure and follow-up with primary care in the next couple days.  Patient's blood pressure decreased here without any intervention.  Patient's headache also resolved.    -----  ED Disposition       ED Disposition   Discharge    Condition   Stable    Comment   --             Final diagnoses:   Elevated blood pressure reading   Other headache syndrome      Your Follow-Up Providers       Jazmine Craig MD.    Specialty: Family Medicine  [address] [place] [address] [phone]                       Contact information for after-discharge care    Follow-up information has not been specified.                     Your medication list        CONTINUE taking these medications        Instructions Last Dose Given Next Dose Due   Acetaminophen Extra Strength 500 MG tablet  Commonly known as: [facility] Acetaminophen Ex St      Take 1 tablet by mouth Every 6 (Six) Hours As Needed for pain.       aMILoride 5 MG tablet  Commonly known as: MIDAMOR      Take 1 tablet by mouth Daily.       aspirin 81 MG tablet      Take 1 tablet by mouth Daily.       atorvastatin 80 MG tablet  Commonly known as: LIPITOR      Take 1 tablet by mouth Every Night.       chlorhexidine 0.12 % solution  Commonly known as: Periogard      Swish 15 mL by mouth for 30 seconds, then spit out twice daily after brushing teeth       chlorhexidine 0.12 % solution  Commonly known as: Periogard      Swish 15 mL by mouth 2 times per day after brushing teeth. Swish in mouth for 30 seconds then spit out. Do not swallow       cycloSPORINE 0.05 % ophthalmic emulsion  Commonly known as: Restasis MultiDose      Administer 1 drop to both eyes 2 (Two) Times a Day.       Diclofenac Sodium 1 % gel gel  Commonly known as: VOLTAREN      Apply 2 grams topically to affected area 3 to 4 times daily as needed for pain       glucose blood test strip      Use 1 test strip to test blood sugar 2 (Two) Times a Day as directed.       hydroCHLOROthiazide 25 MG tablet      Take 1 tablet by mouth Daily.       hydroCHLOROthiazide 25 MG tablet      Take 1 tablet by mouth Daily.       HYDROcodone-acetaminophen 5-325 MG per tablet  Commonly known as: NORCO      Take 1 tablet by mouth Every 6 (Six) Hours As Needed for pain       hydroxychloroquine 200 MG tablet  Commonly known as: PLAQUENIL      Take 1 tablet by mouth 2 (Two) Times a Day.       hydroxychloroquine 200 MG tablet  Commonly known as: PLAQUENIL      Take 1 tablet by mouth Every 12 (Twelve) Hours.       ibuprofen 800 MG tablet  Commonly known as: ADVIL,MOTRIN      Take 1 tablet by mouth Every 8 (Eight) Hours As Needed for pain       ibuprofen  800 MG tablet  Commonly known as: ADVIL,MOTRIN      Take 1 tablet by mouth Every 6 (Six) Hours to 8 (Eight) Hours as needed for PAIN.       ibuprofen 800 MG tablet  Commonly known as: ADVIL,MOTRIN      Take 1 tablet by mouth every 6 (six) to 8 (eight) hours as needed for pain.       loratadine 10 MG tablet  Commonly known as: Allergy Relief      Take 1 tablet by mouth Daily for allergies.       losartan 100 MG tablet  Commonly known as: COZAAR      Take 1 tablet by mouth Daily.       olopatadine 0.1 % ophthalmic solution  Commonly known as: Pataday      Instill 1 drop into affected eye(s) 2 (Two) Times a Day at intervals of every 6 to 8 hours       Ozempic (1 MG/DOSE) 4 MG/3ML solution pen-injector  Generic drug: Semaglutide (1 MG/DOSE)      Inject 1 mg under the skin into the appropriate area as directed 1 (One) Time Per Week.       rosuvastatin 5 MG tablet  Commonly known as: CRESTOR      Take 1 tablet by mouth Daily.

## 2025-07-11 NOTE — DISCHARGE INSTRUCTIONS
Return for worsening or changing symptoms.  Please follow-up with primary care in the next couple days.